# Patient Record
Sex: FEMALE | Race: WHITE | NOT HISPANIC OR LATINO | Employment: FULL TIME | URBAN - METROPOLITAN AREA
[De-identification: names, ages, dates, MRNs, and addresses within clinical notes are randomized per-mention and may not be internally consistent; named-entity substitution may affect disease eponyms.]

---

## 2020-06-02 ENCOUNTER — INITIAL PRENATAL (OUTPATIENT)
Dept: OBGYN | Facility: CLINIC | Age: 20
End: 2020-06-02
Payer: COMMERCIAL

## 2020-06-02 ENCOUNTER — HOSPITAL ENCOUNTER (OUTPATIENT)
Facility: MEDICAL CENTER | Age: 20
End: 2020-06-02
Attending: OBSTETRICS & GYNECOLOGY
Payer: COMMERCIAL

## 2020-06-02 VITALS
DIASTOLIC BLOOD PRESSURE: 73 MMHG | SYSTOLIC BLOOD PRESSURE: 103 MMHG | HEIGHT: 64 IN | WEIGHT: 163.3 LBS | BODY MASS INDEX: 27.88 KG/M2

## 2020-06-02 DIAGNOSIS — Z78.9 HISTORY OF INCARCERATION: ICD-10-CM

## 2020-06-02 DIAGNOSIS — F32.A DEPRESSION, UNSPECIFIED DEPRESSION TYPE: ICD-10-CM

## 2020-06-02 DIAGNOSIS — Z34.02 ENCOUNTER FOR PRENATAL CARE OF FIRST PREGNANCY, SECOND TRIMESTER: ICD-10-CM

## 2020-06-02 LAB
APPEARANCE UR: CLEAR
BILIRUB UR STRIP-MCNC: NORMAL MG/DL
C TRACH DNA SPEC QL NAA+PROBE: NEGATIVE
COLOR UR AUTO: YELLOW
GLUCOSE UR STRIP.AUTO-MCNC: NEGATIVE MG/DL
KETONES UR STRIP.AUTO-MCNC: NEGATIVE MG/DL
LEUKOCYTE ESTERASE UR QL STRIP.AUTO: NORMAL
N GONORRHOEA DNA SPEC QL NAA+PROBE: NEGATIVE
NITRITE UR QL STRIP.AUTO: NEGATIVE
PH UR STRIP.AUTO: 6 [PH] (ref 5–8)
PROT UR QL STRIP: NEGATIVE MG/DL
RBC UR QL AUTO: NEGATIVE
SP GR UR STRIP.AUTO: NEGATIVE
SPECIMEN SOURCE: NORMAL
UROBILINOGEN UR STRIP-MCNC: NORMAL MG/DL

## 2020-06-02 PROCEDURE — 81002 URINALYSIS NONAUTO W/O SCOPE: CPT | Performed by: OBSTETRICS & GYNECOLOGY

## 2020-06-02 PROCEDURE — 59401 PR NEW OB VISIT: CPT | Performed by: OBSTETRICS & GYNECOLOGY

## 2020-06-02 PROCEDURE — 76815 OB US LIMITED FETUS(S): CPT | Performed by: OBSTETRICS & GYNECOLOGY

## 2020-06-02 PROCEDURE — 87591 N.GONORRHOEAE DNA AMP PROB: CPT

## 2020-06-02 PROCEDURE — 87491 CHLMYD TRACH DNA AMP PROBE: CPT

## 2020-06-02 RX ORDER — PNV NO.95/FERROUS FUM/FOLIC AC 28MG-0.8MG
1 TABLET ORAL DAILY
Qty: 30 TAB | Refills: 6 | Status: SHIPPED | OUTPATIENT
Start: 2020-06-02 | End: 2021-03-01

## 2020-06-02 SDOH — HEALTH STABILITY: MENTAL HEALTH: HOW OFTEN DO YOU HAVE A DRINK CONTAINING ALCOHOL?: NEVER

## 2020-06-02 NOTE — PROGRESS NOTES
NOB visit  LMP:11/7/2020  Good Phone #:[phone]  Pharmacy verified.  C/o: Pt states had a hx of bv on 4/2020 and had tx'd. Pt states having no complaints for today.

## 2020-06-02 NOTE — LETTER
"Count Your Baby's Movements  Another step to a healthy delivery    Caitlyn gato            Dept: [phone]    How Many Weeks Pregnant? Unknown    Date to Begin Counting: Today, 6/2/2020              How to use this chart    One way for your physician to keep track of your baby's health is by knowing how often the baby moves (or \"kicks\") in your womb.  You can help your physician to do this by using this chart every day.    Every day, you should see how many hours it takes for your baby to move 10 times.  Start in the morning, as soon as you get up.    · First, write down the time your baby moves until you get to 10.  · Check off one box every time your baby moves until you get to 10.  · Write down the time you finished counting in the last column.  · Total how long it took to count up all 10 movements.  · Finally, fill in the box that shows how long this took.  After counting 10 movements, you no longer have to count any more that day.  The next morning, just start counting again as soon as you get up.    What should you call a \"movement\"?  It is hard to say, because it will feel different from one mother to another and from one pregnancy to the next.  The important thing is that you count the movements the same way throughout your pregnancy.  If you have more questions, you should ask your physician.    Count carefully every day!  SAMPLE:  Week 28    How many hours did it take to feel 10 movements?       Start  Time     1     2     3     4     5     6     7     8     9     10   Finish Time   Mon 8:20 ·  ·  ·  ·  ·  ·  ·  ·  ·  ·  11:40   Tue Wed Thu Fri               Sat               Sun                 IMPORTANT: You should contact your physician if it takes more than two hours for you to feel 10 movements.  Each morning, write down the time and start to count the movements of your baby.  Keep track by checking off one box every time you feel one movement.  When you " "have felt 10 \"kicks\", write down the time you finished counting in the last column.  Then fill in the   box (over the check domingo) for the number of hours it took.  Be sure to read the complete instructions on the previous page.            "

## 2020-06-02 NOTE — PROGRESS NOTES
"Cc: establish care.    HPI:  The patient is a 19 y.o.  here for new ob visit.  Her LMP was 2019 making her around 29 weeks with HILTON of 2020.  [facility].  She states she was [place] from the facility to a doctor's office.  She did have a 10-week ultrasound where they told her her due date would be .  She also states she received a level 2 ultrasound and quad screening.  Patient states she was given a 3-hour glucose test twice because the facility states they \"did it wrong\".  She was given the diagnosis of gestational diabetes.  She has not been testing her glucose.    Social: FOB is involved.  Name Rajan.  Patient currently living at home with her mother under house arrest.  She denies any history of mental, sexual, or physical abuse.  She denies any history of drug abuse.  Did smoke marijuana but not during pregnancy.    GYN history  LMP 2019.  Cycles are regular monthly lasting 4 to 6 days.  States she did get a Pap smear in alf which was negative.  Remote history of HPV however repeat Pap smear was negative.  Contraceptive history: Combination oral contraceptives however could not remember to take them  Denies any history of sexually transmitted infections    OB History    Para Term  AB Living   4 1 1   2 1   SAB TAB Ectopic Molar Multiple Live Births     2       1      # Outcome Date GA Lbr Ranjeet/2nd Weight Sex Delivery Anes PTL Lv   4 Current            3 TAB 2019           2 TAB 2018           1 Term 17 40w0d   M Induction EPI  GWENDOLYN     Past Medical History:   Diagnosis Date   • Depression 2020     Past Surgical History:   Procedure Laterality Date   • OTHER      Nose surgery     Social History     Socioeconomic History   • Marital status: Single     Spouse name: Not on file   • Number of children: Not on file   • Years of education: Not on file   • Highest education level: Not on file " "  Occupational History   • Not on file   Social Needs   • Financial resource strain: Not on file   • Food insecurity     Worry: Not on file     Inability: Not on file   • Transportation needs     Medical: Not on file     Non-medical: Not on file   Tobacco Use   • Smoking status: Never Smoker   • Smokeless tobacco: Never Used   Substance and Sexual Activity   • Alcohol use: Never     Frequency: Never   • Drug use: Never   • Sexual activity: Yes     Partners: Male     Comment: Planned pregnancy, not  but baby is welcomed. FOB  is iinvolved and  supportive.   Lifestyle   • Physical activity     Days per week: Not on file     Minutes per session: Not on file   • Stress: Not on file   Relationships   • Social connections     Talks on phone: Not on file     Gets together: Not on file     Attends [employer] service: Not on file     Active member of club or organization: Not on file     Attends meetings of clubs or organizations: Not on file     Relationship status: Not on file   • Intimate partner violence     Fear of current or ex partner: Not on file     Emotionally abused: Not on file     Physically abused: Not on file     Forced sexual activity: Not on file   Other Topics Concern   • Not on file   Social History Narrative   • Not on file     History reviewed. No pertinent family history.  Allergies:   Allergies as of 06/02/2020 - Reviewed 06/02/2020   Allergen Reaction Noted   • Pcn [penicillins]  06/02/2020       PE:    /73   Ht 1.626 m (5' 4\")   Wt 74.1 kg (163 lb 4.8 oz)     General: no apparent distress, is well developed and well nourished  Head: normocephalic, atraumatic  Neck: neck is supple, non-tender, no thyromegaly, full range of motion  CVS: regular rate and rhythm, no peripheral edema  Lungs: Normal respiratory effort. Clear to auscultation bilaterally  Abdomen: Bowel sounds positive, nondistended, soft, nontender x4, no rebound or guarding.  Female GYN: normal female external genitalia " without lesions, curd-like vaginal discharge noted, normal cervix, normal adnexa without tenderness  Skin: No rashes, or ulcers or lesions seen  Psychiatric: Patient shows appropriate affect, is alert and oriented x3, intact judgment and insight.    Bedside ultrasound performed  Indication: Dating and viability  Findings:  Single IUP in vertex position.  Positive fetal movement.  LAM 13 cm.  BPD 7.76 cm.  31 weeks 1 day  HC 9.19 cm.  29 weeks 1 day  AC 24.44 cm.  28 weeks 5 days  FL 5.57 cm.  29 weeks 2 days.  Impression:Gestational age by ultrasound 29 weeks 4 days.  Estimated due date 2020      A/P:  19 y.o.  29w5d based upon  Patient's last menstrual period was 2019 (exact date)..  She is here for her new obstetric visit.    DATIN. Encounter for prenatal care of first pregnancy, second trimester  POCT Urinalysis    URINE DRUG SCREEN W/CONF (AR)    Chlamydia/GC PCR Urine Or Swab    PRENATAL PANEL 3+HIV+HCV    HEMOGLOBIN A1C    GLUCOSE 1HR GESTATIONAL    CANCELED: US-OB 2ND 3RD TRI COMPLETE   2. History of incarceration     3. Depression, unspecified depression type       -Dating: As patient claims she had a 10-week ultrasound and was told her due date was , discussed with her that this is likely the most accurate due date however discussed with her that she is measuring consistent with her LMP today which would make her due .  For now, we will keep her due date of 813 until we can review the records from the [facility]  - Ultrasound for dates and anatomy was performed at [facility].  We will request the records ASAP  - Screening: Quad screening was also performed in [facility]  -Glucose screening: Given that the patient is unsure whether or not she has gestational diabetes as she took a 3-hour test twice, we will initiate 1 hour glucose screening and A1c today  -Depression: She was started on Zoloft 50 mg in [facility].  I advised the patient today that antidepressants such as Zoloft are  associated with potential for  withdrawal and jitteriness.  I also advised that would prefer to keep the patient on this medication with her previous social history and will alert neonatology at time of delivery.  Patient understands and is willing to comply  -Candidiasis: Diagnosed on visual inspection of the vaginal wall today.  Terconazole per vagina for 7 days prescribed.  Discussed with patient how to use this medication.    - NOB packet given with ACOG prenatal book.  - Increase water intake and encouraged healthy nutrition. Encouraged moderate exercise may continue into final trimester.   - Continue prenatal vitamins.  - Follow-up in 2 weeks.   - SAB precautions educated.  - Oriented to practice model and number of expected visits in the future.

## 2020-06-08 ENCOUNTER — HOSPITAL ENCOUNTER (OUTPATIENT)
Dept: LAB | Facility: MEDICAL CENTER | Age: 20
End: 2020-06-08
Attending: OBSTETRICS & GYNECOLOGY
Payer: COMMERCIAL

## 2020-06-08 DIAGNOSIS — Z34.02 ENCOUNTER FOR PRENATAL CARE OF FIRST PREGNANCY, SECOND TRIMESTER: ICD-10-CM

## 2020-06-08 LAB
ABO GROUP BLD: NORMAL
BLD GP AB SCN SERPL QL: NORMAL
EST. AVERAGE GLUCOSE BLD GHB EST-MCNC: 91 MG/DL
HBA1C MFR BLD: 4.8 % (ref 0–5.6)
RH BLD: NORMAL

## 2020-06-08 PROCEDURE — 86900 BLOOD TYPING SEROLOGIC ABO: CPT

## 2020-06-08 PROCEDURE — 86762 RUBELLA ANTIBODY: CPT

## 2020-06-08 PROCEDURE — 36415 COLL VENOUS BLD VENIPUNCTURE: CPT

## 2020-06-08 PROCEDURE — 86780 TREPONEMA PALLIDUM: CPT

## 2020-06-08 PROCEDURE — 87389 HIV-1 AG W/HIV-1&-2 AB AG IA: CPT

## 2020-06-08 PROCEDURE — 85025 COMPLETE CBC W/AUTO DIFF WBC: CPT

## 2020-06-08 PROCEDURE — 82950 GLUCOSE TEST: CPT

## 2020-06-08 PROCEDURE — 86803 HEPATITIS C AB TEST: CPT

## 2020-06-08 PROCEDURE — 87340 HEPATITIS B SURFACE AG IA: CPT

## 2020-06-08 PROCEDURE — 86850 RBC ANTIBODY SCREEN: CPT

## 2020-06-08 PROCEDURE — 86901 BLOOD TYPING SEROLOGIC RH(D): CPT

## 2020-06-08 PROCEDURE — 83036 HEMOGLOBIN GLYCOSYLATED A1C: CPT

## 2020-06-09 LAB
BASOPHILS # BLD AUTO: 0.4 % (ref 0–1.8)
BASOPHILS # BLD: 0.04 K/UL (ref 0–0.12)
EOSINOPHIL # BLD AUTO: 0.09 K/UL (ref 0–0.51)
EOSINOPHIL NFR BLD: 0.9 % (ref 0–6.9)
ERYTHROCYTE [DISTWIDTH] IN BLOOD BY AUTOMATED COUNT: 46.4 FL (ref 35.9–50)
GLUCOSE 1H P 50 G GLC PO SERPL-MCNC: 117 MG/DL (ref 70–139)
HBV SURFACE AG SER QL: ABNORMAL
HCT VFR BLD AUTO: 41.7 % (ref 37–47)
HCV AB SER QL: ABNORMAL
HGB BLD-MCNC: 13.6 G/DL (ref 12–16)
HIV 1+2 AB+HIV1 P24 AG SERPL QL IA: NORMAL
IMM GRANULOCYTES # BLD AUTO: 0.04 K/UL (ref 0–0.11)
IMM GRANULOCYTES NFR BLD AUTO: 0.4 % (ref 0–0.9)
LYMPHOCYTES # BLD AUTO: 1.73 K/UL (ref 1–4.8)
LYMPHOCYTES NFR BLD: 17 % (ref 22–41)
MCH RBC QN AUTO: 31.6 PG (ref 27–33)
MCHC RBC AUTO-ENTMCNC: 32.6 G/DL (ref 33.6–35)
MCV RBC AUTO: 97 FL (ref 81.4–97.8)
MONOCYTES # BLD AUTO: 0.46 K/UL (ref 0–0.85)
MONOCYTES NFR BLD AUTO: 4.5 % (ref 0–13.4)
NEUTROPHILS # BLD AUTO: 7.82 K/UL (ref 2–7.15)
NEUTROPHILS NFR BLD: 76.8 % (ref 44–72)
NRBC # BLD AUTO: 0 K/UL
NRBC BLD-RTO: 0 /100 WBC
PLATELET # BLD AUTO: 194 K/UL (ref 164–446)
PMV BLD AUTO: 11.2 FL (ref 9–12.9)
RBC # BLD AUTO: 4.3 M/UL (ref 4.2–5.4)
RUBV AB SER QL: 175 IU/ML
TREPONEMA PALLIDUM IGG+IGM AB [PRESENCE] IN SERUM OR PLASMA BY IMMUNOASSAY: ABNORMAL
WBC # BLD AUTO: 10.2 K/UL (ref 4.8–10.8)

## 2020-06-11 ENCOUNTER — TELEPHONE (OUTPATIENT)
Dept: OBGYN | Facility: CLINIC | Age: 20
End: 2020-06-11

## 2020-06-11 ENCOUNTER — DATING (OUTPATIENT)
Dept: OBGYN | Facility: CLINIC | Age: 20
End: 2020-06-11

## 2020-06-11 ENCOUNTER — DOCUMENTATION (OUTPATIENT)
Dept: OBGYN | Facility: CLINIC | Age: 20
End: 2020-06-11

## 2020-06-11 NOTE — PROGRESS NOTES
I reviewed patient's previous prenatal records from [place]  Re: Dating: She was seen at the first trimester and was 7+2 weeks by ultrasound.  According to the note, she was unsure of LMP and so they used HILTON of 8/23/2020  A second note 1 month later stated that her crown-rump length was 7 days more than expected and they were considering re-dating her pending anatomy scan.  Her anatomy scan was performed on 3/23/2020 and showed an HILTON consistent with 8/22/2020 thus they did not change her HILTON.  Normal anatomy noted.    DATING: We will keep her HILTON based off of the 7-week ultrasound of 8/23/2020 as this is the earliest ultrasound and her anatomy scan was also consistent with this.    Regarding glucose tolerance: 2-hour GTT was reportedly borderline high at the fasting level however suspected not performed correctly and the patient was not truly fasting.  I ordered a 1 hour which she passed and her A1c was 4.8% thus I doubt she has gestational diabetes.    Other prenatal labs:  Integrated screen negative,   HIV negative  Urine drug screen negative  Pap smear negative except bacterial vaginosis  Oh positive antibody screen negative, rubella immune, hepatitis B negative, RPR negative, hemoglobin 13, platelets 221.  TSH 2.48  Baseline creatinine 0.53

## 2020-06-11 NOTE — TELEPHONE ENCOUNTER
Pt notified, no further questions.     ----- Message from Joanie Roy D.O. sent at 6/9/2020  8:57 AM PDT -----  Please call patient and let her know she passed the glucose testing and her labs look normal.  ------------------------------------------------    This patient is a transfer of care from [place]. Can you double check on the request of records that we sent for last week? Specifically her confirmation of pregnancy visit notes. I need it to compare dating.

## 2020-06-11 NOTE — TELEPHONE ENCOUNTER
06/11/20 9:45 AM  LM to call back in regards to results below.    ----- Message from Joanie Roy D.O. sent at 6/9/2020  8:57 AM PDT -----  Please call patient and let her know she passed the glucose testing and her labs look normal.  ------------------------------------------------    This patient is a transfer of care from [place]. Can you double check on the request of records that we sent for last week? Specifically her confirmation of pregnancy visit notes. I need it to compare dating.

## 2020-06-17 ENCOUNTER — ROUTINE PRENATAL (OUTPATIENT)
Dept: OBGYN | Facility: CLINIC | Age: 20
End: 2020-06-17
Payer: OTHER GOVERNMENT

## 2020-06-17 VITALS — SYSTOLIC BLOOD PRESSURE: 113 MMHG | BODY MASS INDEX: 27.82 KG/M2 | DIASTOLIC BLOOD PRESSURE: 62 MMHG | WEIGHT: 162.1 LBS

## 2020-06-17 DIAGNOSIS — Z34.80 SUPERVISION OF OTHER NORMAL PREGNANCY, ANTEPARTUM: ICD-10-CM

## 2020-06-17 PROCEDURE — 90040 PR PRENATAL FOLLOW UP: CPT | Performed by: OBSTETRICS & GYNECOLOGY

## 2020-06-17 NOTE — LETTER
"Count Your Baby's Movements  Another step to a healthy delivery    Caitlyn Dara           Dept: [phone]    How Many Weeks Pregnant:30W3D    Date to Begin Counting: Today 6/17/2020              How to use this chart    One way for your physician to keep track of your baby's health is by knowing how often the baby moves (or \"kicks\") in your womb.  You can help your physician to do this by using this chart every day.    Every day, you should see how many hours it takes for your baby to move 10 times.  Start in the morning, as soon as you get up.    · First, write down the time your baby moves until you get to 10.  · Check off one box every time your baby moves until you get to 10.  · Write down the time you finished counting in the last column.  · Total how long it took to count up all 10 movements.  · Finally, fill in the box that shows how long this took.  After counting 10 movements, you no longer have to count any more that day.  The next morning, just start counting again as soon as you get up.    What should you call a \"movement\"?  It is hard to say, because it will feel different from one mother to another and from one pregnancy to the next.  The important thing is that you count the movements the same way throughout your pregnancy.  If you have more questions, you should ask your physician.    Count carefully every day!  SAMPLE:  Week 28    How many hours did it take to feel 10 movements?       Start  Time     1     2     3     4     5     6     7     8     9     10   Finish Time   Mon 8:20 ·  ·  ·  ·  ·  ·  ·  ·  ·  ·  11:40   Tue Wed Thu Fri               Sat               Sun                 IMPORTANT: You should contact your physician if it takes more than two hours for you to feel 10 movements.  Each morning, write down the time and start to count the movements of your baby.  Keep track by checking off one box every time you feel one movement.  When you have " "felt 10 \"kicks\", write down the time you finished counting in the last column.  Then fill in the   box (over the check domingo) for the number of hours it took.  Be sure to read the complete instructions on the previous page.            "

## 2020-06-17 NOTE — PROGRESS NOTES
Pt's here for OB follow-up  Reports + fetal movement  No VB, LOF or UC's.  Good Phone #:[phone]  Pharmacy verified.  Pt states has had bump between her left breast and armpit x 4 mos.  Pt states that she had her Tdap vaccine on 5/18/2020.

## 2020-06-17 NOTE — PROGRESS NOTES
S: Pt presents for routine OB follow up.  No contractions, vaginal bleeding, or leaking fluids. Good fetal movement.    O: /62   Wt 73.5 kg (162 lb 1.6 oz)   LMP 2019 (Exact Date)   BMI 27.82 kg/m²   Vitals:    20 0757   BP: 113/62   Weight: 73.5 kg (162 lb 1.6 oz)     Vitals, fundal height , fetal position, and FHR reviewed on flowsheet    Patient Active Problem List   Diagnosis   • History of incarceration   • Depression       Lab:  Recent Labs     20  1258 20  1300   ABOGROUP  --  O   RUBELLAIGG 175.00  --    HEPBSAG Non-Reactive  --    HEPCAB Non-Reactive  --        A/P:  19 y.o.  at 30w3d presents for routine obstetric follow-up.     #Prenatal care  - Continue prenatal vitamins.  HILTON by 7wk US c/w level II US. Previous care w/ [place] [place] [facility]. Record requested.  PNP, anatomy, flu/tdap, quad screening all done and normal from [place] records. GCCT negative  1 hour GTT negative here. A1C 4.8  Del planning: desires epidural. Interested in IOL at 40 weeks.    #Depression: on zoloft 50mg daily      - Follow-up: 2 weeks

## 2020-07-02 ENCOUNTER — ROUTINE PRENATAL (OUTPATIENT)
Dept: OBGYN | Facility: CLINIC | Age: 20
End: 2020-07-02
Payer: OTHER GOVERNMENT

## 2020-07-02 VITALS — SYSTOLIC BLOOD PRESSURE: 106 MMHG | BODY MASS INDEX: 28.84 KG/M2 | WEIGHT: 168 LBS | DIASTOLIC BLOOD PRESSURE: 62 MMHG

## 2020-07-02 DIAGNOSIS — Z34.83 ENCOUNTER FOR SUPERVISION OF OTHER NORMAL PREGNANCY IN THIRD TRIMESTER: ICD-10-CM

## 2020-07-02 PROCEDURE — 90040 PR PRENATAL FOLLOW UP: CPT | Performed by: OBSTETRICS & GYNECOLOGY

## 2020-07-02 NOTE — NON-PROVIDER
OB follow up   + fetal movement.  No VB, LOF or UC's.  Wt:168       BP:106/62  Phone # [phone]  Preferred pharmacy confirmed.  Patient states that she needs 2 dental authorizations.

## 2020-07-02 NOTE — LETTER
2020      Caitlyn Diamond is currently pregnant and being cared for by [facility].     She is medically cleared for:    1. Dental exams and routine cleaning  2. Tooth fillings and extractions as needed  3. Antibiotic therapy as appropriate  4. Local anesthesia    Patient may be administered the followin% Lidocaine with 1:100,000 Epinephrine  4% Septocaine with 1:100,000 Epinephrine  Nitrous Oxide  A narcotic or non-narcotic pain medication  An antibiotic such as penicillin or clindamycin    NO TETRACYCLINE and NO CODEINE        Thank you,          Aziza Pond M.D.    Electronically Signed

## 2020-07-02 NOTE — PROGRESS NOTES
19 y.o.  32w4d The patient is here for routine obstetrical followup. She reports good fetal movement. She denies contractions, vaginal bleeding, or leaking of fluid.    The patient's pregnancy is complicated by   Patient Active Problem List    Diagnosis Date Noted   • Supervision of other normal pregnancy, antepartum 2020   • History of incarceration 2020   • Depression 2020   Note for dental clearance given.       Followup in 2 weeks   labor precautions were discussed with patient  Fetal kick counts were discussed with patient

## 2020-07-02 NOTE — LETTER
2020      Caitlyn Diamond is currently pregnant and being cared for by [facility].     She is medically cleared for:    1. Dental exams and routine cleaning  2. Tooth fillings and extractions as needed  3. Antibiotic therapy as appropriate  4. Local anesthesia    Patient may be administered the followin% Lidocaine with 1:100,000 Epinephrine  4% Septocaine with 1:100,000 Epinephrine  Nitrous Oxide  A narcotic or non-narcotic pain medication  An antibiotic such as penicillin or clindamycin    NO TETRACYCLINE and NO CODEINE        Thank you,          Aziza Pond M.D.    Electronically Signed

## 2020-07-15 ENCOUNTER — ROUTINE PRENATAL (OUTPATIENT)
Dept: OBGYN | Facility: CLINIC | Age: 20
End: 2020-07-15
Payer: OTHER GOVERNMENT

## 2020-07-15 VITALS
HEART RATE: 77 BPM | SYSTOLIC BLOOD PRESSURE: 105 MMHG | WEIGHT: 170 LBS | BODY MASS INDEX: 29.18 KG/M2 | DIASTOLIC BLOOD PRESSURE: 60 MMHG

## 2020-07-15 DIAGNOSIS — Z34.83 ENCOUNTER FOR SUPERVISION OF OTHER NORMAL PREGNANCY IN THIRD TRIMESTER: ICD-10-CM

## 2020-07-15 LAB
APPEARANCE UR: CLEAR
BILIRUB UR STRIP-MCNC: NORMAL MG/DL
COLOR UR AUTO: YELLOW
GLUCOSE UR STRIP.AUTO-MCNC: NEGATIVE MG/DL
KETONES UR STRIP.AUTO-MCNC: NEGATIVE MG/DL
LEUKOCYTE ESTERASE UR QL STRIP.AUTO: NORMAL
NITRITE UR QL STRIP.AUTO: NEGATIVE
PH UR STRIP.AUTO: 7 [PH] (ref 5–8)
PROT UR QL STRIP: NEGATIVE MG/DL
RBC UR QL AUTO: NEGATIVE
SP GR UR STRIP.AUTO: 1.01
UROBILINOGEN UR STRIP-MCNC: NORMAL MG/DL

## 2020-07-15 PROCEDURE — 90040 PR PRENATAL FOLLOW UP: CPT | Performed by: OBSTETRICS & GYNECOLOGY

## 2020-07-15 PROCEDURE — 81002 URINALYSIS NONAUTO W/O SCOPE: CPT | Performed by: OBSTETRICS & GYNECOLOGY

## 2020-07-15 NOTE — PROGRESS NOTES
Pt here today for OB follow up  Pt states kidney pain  Reports +FM  Good # [phone]  Pharmacy Confirmed.  Chaperone offered and provided.

## 2020-07-15 NOTE — PROGRESS NOTES
S: Pt presents for routine OB follow up.  No contractions, vaginal bleeding, or leaking fluids. Good fetal movement.  Admits to some left-sided back pain which reminds her of an episode of nephrolithiasis she had during her previous pregnancy albeit that episode was at 20 weeks.  Does not have any dysuria    O: /60   Pulse 77   Wt 77.1 kg (170 lb)   LMP 2019 (Exact Date)   BMI 29.18 kg/m²   Vitals:    07/15/20 1607   BP: 105/60   Weight: 77.1 kg (170 lb)     Vitals, fundal height , fetal position, and FHR reviewed on flowsheet    Patient Active Problem List   Diagnosis   • History of incarceration   • Depression   • Supervision of other normal pregnancy, antepartum       Lab:  Recent Labs     20  1258 20  1300   ABOGROUP  --  O   RUBELLAIGG 175.00  --    HEPBSAG Non-Reactive  --    HEPCAB Non-Reactive  --        A/P:  19 y.o.  at 34w3d presents for routine obstetric follow-up.     #Prenatal care  - Continue prenatal vitamins.  HILTON by 7wk US c/w level II US. Previous care w/ [place] [place] [facility]. Record requested.  PNP, anatomy, flu/tdap, quad screening all done and normal from [place] records. GCCT negative  1 hour GTT negative here. A1C 4.8  Del planning: desires epidural. Interested in IOL at 40 weeks.    #Depression: on zoloft 50mg daily  -In office urine dip was negative for blood, nitrites, and only positive for small leukocyte esterase.  Discussed with patient no indication for kidney stone at this point.  Advised her to increase hydration and may use a heat pack to the back.  - Follow-up: 2wks for GBS

## 2020-07-30 ENCOUNTER — ROUTINE PRENATAL (OUTPATIENT)
Dept: OBGYN | Facility: CLINIC | Age: 20
End: 2020-07-30
Payer: OTHER GOVERNMENT

## 2020-07-30 ENCOUNTER — HOSPITAL ENCOUNTER (OUTPATIENT)
Facility: MEDICAL CENTER | Age: 20
End: 2020-07-30
Attending: OBSTETRICS & GYNECOLOGY
Payer: OTHER GOVERNMENT

## 2020-07-30 VITALS — DIASTOLIC BLOOD PRESSURE: 62 MMHG | WEIGHT: 176 LBS | BODY MASS INDEX: 30.21 KG/M2 | SYSTOLIC BLOOD PRESSURE: 106 MMHG

## 2020-07-30 DIAGNOSIS — Z34.80 SUPERVISION OF OTHER NORMAL PREGNANCY, ANTEPARTUM: ICD-10-CM

## 2020-07-30 DIAGNOSIS — Z3A.36 PREGNANCY WITH 36 COMPLETED WEEKS GESTATION: ICD-10-CM

## 2020-07-30 DIAGNOSIS — Z78.9 HISTORY OF INCARCERATION: ICD-10-CM

## 2020-07-30 DIAGNOSIS — F32.0 CURRENT MILD EPISODE OF MAJOR DEPRESSIVE DISORDER WITHOUT PRIOR EPISODE (HCC): ICD-10-CM

## 2020-07-30 PROCEDURE — 90040 PR PRENATAL FOLLOW UP: CPT | Performed by: OBSTETRICS & GYNECOLOGY

## 2020-07-30 PROCEDURE — 87150 DNA/RNA AMPLIFIED PROBE: CPT

## 2020-07-30 PROCEDURE — 87081 CULTURE SCREEN ONLY: CPT

## 2020-07-30 NOTE — PROGRESS NOTES
Pt here today for OB follow up  Pt states   Reports +FM  Pharmacy Confirmed.  Chaperone offered and declined.

## 2020-07-30 NOTE — PROGRESS NOTES
S: Pt presents for routine OB follow up.  Patient is doing well without complaints.  Reports normal  fetal movement.  Denies headaches or scotoma.  Reports normal bowel and bladder functions  No contractions, vaginal bleeding, or leakage of fluid.  Patient is desiring induction of labor at 39 weeks-induction of labor discussed.  Referral placed  Questions answered.    O: LMP 2019 (Exact Date)   Patients' weight gain, fluid intake and exercise level discussed.  Vitals, fundal height , fetal position, and FHR reviewed on flowsheet    Lab:No results found for this or any previous visit (from the past 336 hour(s)).    A/P:  19 y.o.  at 36w4d presents for routine obstetric follow-up.  Doing well  Size equals dates   Encounter Diagnoses   Name Primary?   • Supervision of other normal pregnancy, antepartum    • Pregnancy with 36 completed weeks gestation    • History of incarceration-patient wears ankle monitor    • Current mild episode of major depressive disorder without prior episode (HCC).  Denies any current symptoms of depression          1.  Continue prenatal vitamins.  2.  Fetal kick counts daily discussed.  3.  Exercise at least 30 minutes daily.  4.  Drink at least 2L of water daily  5.  Pregnancy and labor precautions educated.  6.  Follow-up in 1 week.  7.  GBS culture obtained today

## 2020-07-31 LAB — GP B STREP DNA SPEC QL NAA+PROBE: NEGATIVE

## 2020-08-04 ENCOUNTER — ROUTINE PRENATAL (OUTPATIENT)
Dept: OBGYN | Facility: CLINIC | Age: 20
End: 2020-08-04
Payer: OTHER GOVERNMENT

## 2020-08-04 ENCOUNTER — TELEPHONE (OUTPATIENT)
Dept: OBGYN | Facility: CLINIC | Age: 20
End: 2020-08-04

## 2020-08-04 VITALS — SYSTOLIC BLOOD PRESSURE: 111 MMHG | DIASTOLIC BLOOD PRESSURE: 66 MMHG | WEIGHT: 177 LBS | BODY MASS INDEX: 30.38 KG/M2

## 2020-08-04 DIAGNOSIS — Z34.80 SUPERVISION OF OTHER NORMAL PREGNANCY, ANTEPARTUM: ICD-10-CM

## 2020-08-04 PROCEDURE — 90040 PR PRENATAL FOLLOW UP: CPT | Performed by: OBSTETRICS & GYNECOLOGY

## 2020-08-04 NOTE — PROGRESS NOTES
OB Followup;    37w2d    Patient Active Problem List    Diagnosis Date Noted   • Supervision of other normal pregnancy, antepartum 2020   • History of incarceration 2020   • Depression 2020       Vitals:    20 1016   BP: 111/66   Weight: 80.3 kg (177 lb)       Patient presents for followup of OB care. Currently doing well . Good fetal movement no leakage of fluid no contractions or vaginal bleeding        Size equals dates, normal fetal heart rate      Labs; GBS culture negative    Labor precautions given  Increase oral hydration  Discussed proper weight gain during pregnancy  Continue prenatal vitamins  Signs and symptoms of labor/ labor discussed     Followup in  1 weeks

## 2020-08-04 NOTE — TELEPHONE ENCOUNTER
Pt LM on VM requesting to speak with a provider regarding her birth plan. Pt had appt today and when I asked her if she had discussed this with the provider during her appt today, pt stated she did not. Adv pt she can discuss this further with the provider at her next visit    8/5/20 1557 Spoke with pt, states she needs to speak with a provider to go over birth plan. Again, notified pt she can discuss this at her appt, pt unable to come to next appt. Offered appt for tomorrow at 1300, pt agreed and scheduled

## 2020-08-04 NOTE — NON-PROVIDER
OB follow up   + fetal movement.  No VB, LOF or UC's.  Wt: 177      BP:111/66  Phone # [phone]  C/o feeling some contractions  Preferred pharmacy confirmed.  IP GEL on 08/17/2020 @ 9PM  GBS negative  Patient wants to discuss birth plan.

## 2020-08-06 ENCOUNTER — ROUTINE PRENATAL (OUTPATIENT)
Dept: OBGYN | Facility: CLINIC | Age: 20
End: 2020-08-06
Payer: OTHER GOVERNMENT

## 2020-08-06 VITALS — BODY MASS INDEX: 30.54 KG/M2 | DIASTOLIC BLOOD PRESSURE: 68 MMHG | SYSTOLIC BLOOD PRESSURE: 120 MMHG | WEIGHT: 177.9 LBS

## 2020-08-06 DIAGNOSIS — Z34.80 SUPERVISION OF OTHER NORMAL PREGNANCY, ANTEPARTUM: ICD-10-CM

## 2020-08-06 PROCEDURE — 90040 PR PRENATAL FOLLOW UP: CPT | Performed by: OBSTETRICS & GYNECOLOGY

## 2020-08-06 NOTE — PROGRESS NOTES
OB Followup;    37w4d    Patient Active Problem List    Diagnosis Date Noted   • Supervision of other normal pregnancy, antepartum 2020   • History of incarceration 2020   • Depression 2020       Vitals:    20 1305   BP: 120/68   Weight: 80.7 kg (177 lb 14.4 oz)       Patient presents for followup of OB care. Currently doing well . Good fetal movement no leakage of fluid no contractions or vaginal bleeding        Size equals dates, normal fetal heart rate      Patient has some questions about episiotomy we discussed her concerns in detail.  We discussed that routine episiotomy is no longer performed.  Patient asked about all of oil to massage her perineum.  I discussed that we do not use olive  oil but we will use surgical lube to massage the perineum.    Labor precautions given  Increase oral hydration  Discussed proper weight gain during pregnancy  Continue prenatal vitamins  Signs and symptoms of labor/ labor discussed   Discussed our group's policy on prenatal checkups and delivery    Followup in  1 weeks

## 2020-08-06 NOTE — PROGRESS NOTES
Pt's here for OB follow-up  Reports + fetal movement  No VB, LOF or UC's.  Good Phone #: [phone]  Pharmacy verified.  Pt states feeling nauseous x 2 d and david purdy for q d.  Pt states would like to have cervical check today.  Pt states no complaints for today.

## 2020-08-10 ENCOUNTER — ROUTINE PRENATAL (OUTPATIENT)
Dept: OBGYN | Facility: CLINIC | Age: 20
End: 2020-08-10
Payer: OTHER GOVERNMENT

## 2020-08-10 VITALS — SYSTOLIC BLOOD PRESSURE: 120 MMHG | BODY MASS INDEX: 30.73 KG/M2 | WEIGHT: 179 LBS | DIASTOLIC BLOOD PRESSURE: 66 MMHG

## 2020-08-10 DIAGNOSIS — Z34.83 ENCOUNTER FOR SUPERVISION OF OTHER NORMAL PREGNANCY IN THIRD TRIMESTER: ICD-10-CM

## 2020-08-10 PROCEDURE — 90040 PR PRENATAL FOLLOW UP: CPT | Performed by: OBSTETRICS & GYNECOLOGY

## 2020-08-10 NOTE — PROGRESS NOTES
Pt here today for OB follow up  Pt  Wants to be checked  Reports +FM  Pharmacy Confirmed.  Chaperone offered and declined.

## 2020-08-10 NOTE — PROGRESS NOTES
19 y.o.  38w1d The patient is here for routine obstetrical followup. She reports good fetal movement. She denies contractions, vaginal bleeding, or leaking of fluid.    The patient's pregnancy is complicated by   Patient Active Problem List    Diagnosis Date Noted   • Supervision of other normal pregnancy, antepartum 2020   • History of incarceration 2020   • Depression 2020         Followup for IOL as scheduled on 20.  Labor precautions were discussed with patient  Fetal kick counts were discussed with patient

## 2020-08-11 ENCOUNTER — HOSPITAL ENCOUNTER (INPATIENT)
Facility: MEDICAL CENTER | Age: 20
LOS: 3 days | End: 2020-08-14
Attending: OBSTETRICS & GYNECOLOGY | Admitting: OBSTETRICS & GYNECOLOGY
Payer: COMMERCIAL

## 2020-08-11 ENCOUNTER — ANESTHESIA (OUTPATIENT)
Dept: ANESTHESIOLOGY | Facility: MEDICAL CENTER | Age: 20
End: 2020-08-11
Payer: COMMERCIAL

## 2020-08-11 ENCOUNTER — ANESTHESIA EVENT (OUTPATIENT)
Dept: ANESTHESIOLOGY | Facility: MEDICAL CENTER | Age: 20
End: 2020-08-11
Payer: COMMERCIAL

## 2020-08-11 LAB
BASOPHILS # BLD AUTO: 0.2 % (ref 0–1.8)
BASOPHILS # BLD: 0.02 K/UL (ref 0–0.12)
COVID ORDER STATUS COVID19: NORMAL
EOSINOPHIL # BLD AUTO: 0.09 K/UL (ref 0–0.51)
EOSINOPHIL NFR BLD: 0.8 % (ref 0–6.9)
ERYTHROCYTE [DISTWIDTH] IN BLOOD BY AUTOMATED COUNT: 43.6 FL (ref 35.9–50)
HCT VFR BLD AUTO: 45 % (ref 37–47)
HGB BLD-MCNC: 14.7 G/DL (ref 12–16)
HOLDING TUBE BB 8507: NORMAL
IMM GRANULOCYTES # BLD AUTO: 0.04 K/UL (ref 0–0.11)
IMM GRANULOCYTES NFR BLD AUTO: 0.3 % (ref 0–0.9)
LYMPHOCYTES # BLD AUTO: 2.39 K/UL (ref 1–4.8)
LYMPHOCYTES NFR BLD: 20.1 % (ref 22–41)
MCH RBC QN AUTO: 30.1 PG (ref 27–33)
MCHC RBC AUTO-ENTMCNC: 32.7 G/DL (ref 33.6–35)
MCV RBC AUTO: 92.2 FL (ref 81.4–97.8)
MONOCYTES # BLD AUTO: 0.75 K/UL (ref 0–0.85)
MONOCYTES NFR BLD AUTO: 6.3 % (ref 0–13.4)
NEUTROPHILS # BLD AUTO: 8.63 K/UL (ref 2–7.15)
NEUTROPHILS NFR BLD: 72.3 % (ref 44–72)
NRBC # BLD AUTO: 0 K/UL
NRBC BLD-RTO: 0 /100 WBC
PLATELET # BLD AUTO: 230 K/UL (ref 164–446)
PMV BLD AUTO: 10.3 FL (ref 9–12.9)
RBC # BLD AUTO: 4.88 M/UL (ref 4.2–5.4)
SARS-COV-2 RDRP RESP QL NAA+PROBE: NOTDETECTED
SPECIMEN SOURCE: NORMAL
WBC # BLD AUTO: 11.9 K/UL (ref 4.8–10.8)

## 2020-08-11 PROCEDURE — 700111 HCHG RX REV CODE 636 W/ 250 OVERRIDE (IP)

## 2020-08-11 PROCEDURE — 700105 HCHG RX REV CODE 258

## 2020-08-11 PROCEDURE — C9803 HOPD COVID-19 SPEC COLLECT: HCPCS | Performed by: OBSTETRICS & GYNECOLOGY

## 2020-08-11 PROCEDURE — 770002 HCHG ROOM/CARE - OB PRIVATE (112)

## 2020-08-11 PROCEDURE — 700111 HCHG RX REV CODE 636 W/ 250 OVERRIDE (IP): Performed by: ANESTHESIOLOGY

## 2020-08-11 PROCEDURE — 85025 COMPLETE CBC W/AUTO DIFF WBC: CPT

## 2020-08-11 PROCEDURE — U0004 COV-19 TEST NON-CDC HGH THRU: HCPCS

## 2020-08-11 RX ORDER — SODIUM CHLORIDE, SODIUM LACTATE, POTASSIUM CHLORIDE, AND CALCIUM CHLORIDE .6; .31; .03; .02 G/100ML; G/100ML; G/100ML; G/100ML
1000 INJECTION, SOLUTION INTRAVENOUS
Status: DISCONTINUED | OUTPATIENT
Start: 2020-08-11 | End: 2020-08-12 | Stop reason: HOSPADM

## 2020-08-11 RX ORDER — ROPIVACAINE HYDROCHLORIDE 2 MG/ML
INJECTION, SOLUTION EPIDURAL; INFILTRATION PRN
Status: DISCONTINUED | OUTPATIENT
Start: 2020-08-11 | End: 2020-08-12 | Stop reason: SURG

## 2020-08-11 RX ORDER — MISOPROSTOL 200 UG/1
800 TABLET ORAL
Status: DISCONTINUED | OUTPATIENT
Start: 2020-08-11 | End: 2020-08-12 | Stop reason: HOSPADM

## 2020-08-11 RX ORDER — ROPIVACAINE HYDROCHLORIDE 2 MG/ML
INJECTION, SOLUTION EPIDURAL; INFILTRATION; PERINEURAL CONTINUOUS
Status: DISCONTINUED | OUTPATIENT
Start: 2020-08-12 | End: 2020-08-14 | Stop reason: HOSPADM

## 2020-08-11 RX ORDER — METHYLERGONOVINE MALEATE 0.2 MG/ML
0.2 INJECTION INTRAVENOUS
Status: DISCONTINUED | OUTPATIENT
Start: 2020-08-11 | End: 2020-08-12 | Stop reason: HOSPADM

## 2020-08-11 RX ORDER — ALUMINA, MAGNESIA, AND SIMETHICONE 2400; 2400; 240 MG/30ML; MG/30ML; MG/30ML
30 SUSPENSION ORAL EVERY 6 HOURS PRN
Status: DISCONTINUED | OUTPATIENT
Start: 2020-08-11 | End: 2020-08-12 | Stop reason: HOSPADM

## 2020-08-11 RX ORDER — SODIUM CHLORIDE, SODIUM LACTATE, POTASSIUM CHLORIDE, CALCIUM CHLORIDE 600; 310; 30; 20 MG/100ML; MG/100ML; MG/100ML; MG/100ML
INJECTION, SOLUTION INTRAVENOUS CONTINUOUS
Status: ACTIVE | OUTPATIENT
Start: 2020-08-11 | End: 2020-08-12

## 2020-08-11 RX ORDER — ROPIVACAINE HYDROCHLORIDE 2 MG/ML
INJECTION, SOLUTION EPIDURAL; INFILTRATION; PERINEURAL
Status: COMPLETED
Start: 2020-08-11 | End: 2020-08-11

## 2020-08-11 RX ORDER — DEXTROSE, SODIUM CHLORIDE, SODIUM LACTATE, POTASSIUM CHLORIDE, AND CALCIUM CHLORIDE 5; .6; .31; .03; .02 G/100ML; G/100ML; G/100ML; G/100ML; G/100ML
INJECTION, SOLUTION INTRAVENOUS CONTINUOUS
Status: DISCONTINUED | OUTPATIENT
Start: 2020-08-12 | End: 2020-08-14 | Stop reason: HOSPADM

## 2020-08-11 RX ORDER — SODIUM CHLORIDE, SODIUM LACTATE, POTASSIUM CHLORIDE, AND CALCIUM CHLORIDE .6; .31; .03; .02 G/100ML; G/100ML; G/100ML; G/100ML
250 INJECTION, SOLUTION INTRAVENOUS PRN
Status: DISCONTINUED | OUTPATIENT
Start: 2020-08-11 | End: 2020-08-12 | Stop reason: HOSPADM

## 2020-08-11 RX ORDER — SODIUM CHLORIDE, SODIUM LACTATE, POTASSIUM CHLORIDE, CALCIUM CHLORIDE 600; 310; 30; 20 MG/100ML; MG/100ML; MG/100ML; MG/100ML
INJECTION, SOLUTION INTRAVENOUS
Status: COMPLETED
Start: 2020-08-11 | End: 2020-08-11

## 2020-08-11 RX ADMIN — ROPIVACAINE HYDROCHLORIDE 10 ML: 2 INJECTION, SOLUTION EPIDURAL; INFILTRATION at 22:56

## 2020-08-11 RX ADMIN — ROPIVACAINE HYDROCHLORIDE 200 MG: 2 INJECTION, SOLUTION EPIDURAL; INFILTRATION at 23:16

## 2020-08-11 RX ADMIN — SODIUM CHLORIDE, POTASSIUM CHLORIDE, SODIUM LACTATE AND CALCIUM CHLORIDE: 600; 310; 30; 20 INJECTION, SOLUTION INTRAVENOUS at 22:15

## 2020-08-11 RX ADMIN — FENTANYL CITRATE 50 MCG: 50 INJECTION INTRAMUSCULAR; INTRAVENOUS at 21:54

## 2020-08-11 RX ADMIN — SODIUM CHLORIDE, SODIUM LACTATE, POTASSIUM CHLORIDE, CALCIUM CHLORIDE: 600; 310; 30; 20 INJECTION, SOLUTION INTRAVENOUS at 22:15

## 2020-08-11 RX ADMIN — ROPIVACAINE HYDROCHLORIDE 200 MG: 2 INJECTION, SOLUTION EPIDURAL; INFILTRATION; PERINEURAL at 23:16

## 2020-08-11 SDOH — ECONOMIC STABILITY: FOOD INSECURITY: WITHIN THE PAST 12 MONTHS, YOU WORRIED THAT YOUR FOOD WOULD RUN OUT BEFORE YOU GOT MONEY TO BUY MORE.: PATIENT DECLINED

## 2020-08-11 SDOH — ECONOMIC STABILITY: TRANSPORTATION INSECURITY
IN THE PAST 12 MONTHS, HAS LACK OF TRANSPORTATION KEPT YOU FROM MEETINGS, WORK, OR FROM GETTING THINGS NEEDED FOR DAILY LIVING?: PATIENT DECLINED

## 2020-08-11 SDOH — ECONOMIC STABILITY: FOOD INSECURITY: WITHIN THE PAST 12 MONTHS, THE FOOD YOU BOUGHT JUST DIDN'T LAST AND YOU DIDN'T HAVE MONEY TO GET MORE.: PATIENT DECLINED

## 2020-08-11 SDOH — ECONOMIC STABILITY: TRANSPORTATION INSECURITY
IN THE PAST 12 MONTHS, HAS THE LACK OF TRANSPORTATION KEPT YOU FROM MEDICAL APPOINTMENTS OR FROM GETTING MEDICATIONS?: PATIENT DECLINED

## 2020-08-11 ASSESSMENT — PATIENT HEALTH QUESTIONNAIRE - PHQ9
2. FEELING DOWN, DEPRESSED, IRRITABLE, OR HOPELESS: NOT AT ALL
SUM OF ALL RESPONSES TO PHQ9 QUESTIONS 1 AND 2: 0
1. LITTLE INTEREST OR PLEASURE IN DOING THINGS: NOT AT ALL

## 2020-08-11 ASSESSMENT — LIFESTYLE VARIABLES
EVER_SMOKED: NEVER
ALCOHOL_USE: NO

## 2020-08-12 LAB
ERYTHROCYTE [DISTWIDTH] IN BLOOD BY AUTOMATED COUNT: 41.6 FL (ref 35.9–50)
HCT VFR BLD AUTO: 41.3 % (ref 37–47)
HGB BLD-MCNC: 13.5 G/DL (ref 12–16)
MCH RBC QN AUTO: 29.7 PG (ref 27–33)
MCHC RBC AUTO-ENTMCNC: 32.7 G/DL (ref 33.6–35)
MCV RBC AUTO: 90.8 FL (ref 81.4–97.8)
PLATELET # BLD AUTO: 187 K/UL (ref 164–446)
PMV BLD AUTO: 10.5 FL (ref 9–12.9)
RBC # BLD AUTO: 4.55 M/UL (ref 4.2–5.4)
WBC # BLD AUTO: 15.8 K/UL (ref 4.8–10.8)

## 2020-08-12 PROCEDURE — 700111 HCHG RX REV CODE 636 W/ 250 OVERRIDE (IP)

## 2020-08-12 PROCEDURE — 770002 HCHG ROOM/CARE - OB PRIVATE (112)

## 2020-08-12 PROCEDURE — 59400 OBSTETRICAL CARE: CPT | Performed by: OBSTETRICS & GYNECOLOGY

## 2020-08-12 PROCEDURE — 36415 COLL VENOUS BLD VENIPUNCTURE: CPT

## 2020-08-12 PROCEDURE — 85027 COMPLETE CBC AUTOMATED: CPT

## 2020-08-12 PROCEDURE — 59409 OBSTETRICAL CARE: CPT

## 2020-08-12 PROCEDURE — 700102 HCHG RX REV CODE 250 W/ 637 OVERRIDE(OP): Performed by: OBSTETRICS & GYNECOLOGY

## 2020-08-12 PROCEDURE — A9270 NON-COVERED ITEM OR SERVICE: HCPCS | Performed by: NURSE PRACTITIONER

## 2020-08-12 PROCEDURE — 700102 HCHG RX REV CODE 250 W/ 637 OVERRIDE(OP): Performed by: NURSE PRACTITIONER

## 2020-08-12 PROCEDURE — 304965 HCHG RECOVERY SERVICES

## 2020-08-12 PROCEDURE — A9270 NON-COVERED ITEM OR SERVICE: HCPCS | Performed by: OBSTETRICS & GYNECOLOGY

## 2020-08-12 PROCEDURE — 303615 HCHG EPIDURAL/SPINAL ANESTHESIA FOR LABOR

## 2020-08-12 PROCEDURE — 700111 HCHG RX REV CODE 636 W/ 250 OVERRIDE (IP): Performed by: OBSTETRICS & GYNECOLOGY

## 2020-08-12 RX ORDER — DOCUSATE SODIUM 100 MG/1
100 CAPSULE, LIQUID FILLED ORAL 2 TIMES DAILY PRN
Status: DISCONTINUED | OUTPATIENT
Start: 2020-08-12 | End: 2020-08-14 | Stop reason: HOSPADM

## 2020-08-12 RX ORDER — ONDANSETRON 2 MG/ML
4 INJECTION INTRAMUSCULAR; INTRAVENOUS EVERY 6 HOURS PRN
Status: DISCONTINUED | OUTPATIENT
Start: 2020-08-12 | End: 2020-08-14 | Stop reason: HOSPADM

## 2020-08-12 RX ORDER — ACETAMINOPHEN 500 MG
1000 TABLET ORAL EVERY 8 HOURS PRN
Status: DISCONTINUED | OUTPATIENT
Start: 2020-08-12 | End: 2020-08-14 | Stop reason: HOSPADM

## 2020-08-12 RX ORDER — ONDANSETRON 4 MG/1
4 TABLET, ORALLY DISINTEGRATING ORAL EVERY 6 HOURS PRN
Status: DISCONTINUED | OUTPATIENT
Start: 2020-08-12 | End: 2020-08-14 | Stop reason: HOSPADM

## 2020-08-12 RX ORDER — METOCLOPRAMIDE HYDROCHLORIDE 5 MG/ML
10 INJECTION INTRAMUSCULAR; INTRAVENOUS EVERY 6 HOURS PRN
Status: DISCONTINUED | OUTPATIENT
Start: 2020-08-12 | End: 2020-08-14 | Stop reason: HOSPADM

## 2020-08-12 RX ORDER — SODIUM CHLORIDE, SODIUM LACTATE, POTASSIUM CHLORIDE, CALCIUM CHLORIDE 600; 310; 30; 20 MG/100ML; MG/100ML; MG/100ML; MG/100ML
INJECTION, SOLUTION INTRAVENOUS PRN
Status: DISCONTINUED | OUTPATIENT
Start: 2020-08-12 | End: 2020-08-14 | Stop reason: HOSPADM

## 2020-08-12 RX ORDER — IBUPROFEN 600 MG/1
600 TABLET ORAL EVERY 6 HOURS PRN
Status: DISCONTINUED | OUTPATIENT
Start: 2020-08-12 | End: 2020-08-14 | Stop reason: HOSPADM

## 2020-08-12 RX ADMIN — ACETAMINOPHEN 1000 MG: 500 TABLET ORAL at 17:44

## 2020-08-12 RX ADMIN — SERTRALINE 50 MG: 50 TABLET, FILM COATED ORAL at 10:29

## 2020-08-12 RX ADMIN — ACETAMINOPHEN 1000 MG: 500 TABLET ORAL at 09:32

## 2020-08-12 RX ADMIN — Medication 125 ML/HR: at 03:00

## 2020-08-12 RX ADMIN — IBUPROFEN 600 MG: 600 TABLET ORAL at 19:13

## 2020-08-12 RX ADMIN — IBUPROFEN 600 MG: 600 TABLET ORAL at 05:52

## 2020-08-12 RX ADMIN — Medication: at 02:00

## 2020-08-12 ASSESSMENT — EDINBURGH POSTNATAL DEPRESSION SCALE (EPDS)
THE THOUGHT OF HARMING MYSELF HAS OCCURRED TO ME: NEVER
I HAVE BEEN SO UNHAPPY THAT I HAVE BEEN CRYING: NO, NEVER
I HAVE FELT SAD OR MISERABLE: NO, NOT AT ALL
I HAVE BEEN ABLE TO LAUGH AND SEE THE FUNNY SIDE OF THINGS: AS MUCH AS I ALWAYS COULD
I HAVE BEEN ANXIOUS OR WORRIED FOR NO GOOD REASON: NO, NOT AT ALL
THINGS HAVE BEEN GETTING ON TOP OF ME: NO, I HAVE BEEN COPING AS WELL AS EVER
I HAVE BLAMED MYSELF UNNECESSARILY WHEN THINGS WENT WRONG: NO, NEVER
I HAVE BEEN SO UNHAPPY THAT I HAVE HAD DIFFICULTY SLEEPING: NOT AT ALL
I HAVE FELT SCARED OR PANICKY FOR NO GOOD REASON: NO, NOT AT ALL
I HAVE LOOKED FORWARD WITH ENJOYMENT TO THINGS: AS MUCH AS I EVER DID

## 2020-08-12 ASSESSMENT — PAIN SCALES - GENERAL: PAIN_LEVEL: 0

## 2020-08-12 NOTE — DISCHARGE PLANNING
Discharge Planning Assessment Post Partum    Reason for Referral: CECILLE hx of depression.   Address: [address] [place] [address] [address] [place], [place] [address]  Type of Living Situation: MOB's parents and other child  Mom Diagnosis: Pregnancy   Baby Diagnosis:   Primary Language: English    Name of Baby: Annie   Father of the Baby: Edward [place]  Involved in baby’s care? Yes  Contact Information: [phone]    Prenatal Care: Yes  Mom's PCP: None  PCP for new baby: MOB provided with pediatrician list.     Support System: Yes  Coping/Bonding between mother & baby: Yes  Source of Feeding: Breast  Supplies for Infant: Yes    Mom's Insurance: [employer] and   Baby Covered on Insurance: MOB would like to sign baby up for Medicaid, LSW emailed PFA w/ baby's information.   Mother Employed/School: Not employed.  Other children in the home/names & ages: N/A    Financial Hardship/Income: No  Mom's Mental status: Alert and oriented x4.  Services used prior to admit: None.    CPS History: No.   Psychiatric History: CECILLE has a hx of depression and is currently seeing a therapist regularly and is also on Zoloft.   Domestic Violence History: No.  Drug/ETOH History: Yes, MOB stated she used an edible a month or so prior to baby's birth. MOB stated it was just one time. Baby's UDS was negative.     CECILLE is currently on GPS arrest since she recently was in [place]. MOB stated that she can still go to baby's pediatrician appointment etc. CECILLE's parents are a really good support system for her.      Resources Provided: MOB provided with a list of pediatricians.   Referrals Made: None.      Clearance for Discharge: Baby is clear to d/c with MOB upon medical clearance.     Ongoing Plan: No further social work needs.

## 2020-08-12 NOTE — ANESTHESIA PROCEDURE NOTES
Epidural Block    Date/Time: 8/11/2020 10:55 PM  Performed by: Maurilio Comer M.D.  Authorized by: Maurilio Comer M.D.     Patient Location:  OB  Start Time:  8/11/2020 10:55 PM  Reason for Block: labor analgesia    patient identified, IV checked, site marked, risks and benefits discussed, surgical consent, monitors and equipment checked, pre-op evaluation and timeout performed    Patient Position:  Sitting  Prep: ChloraPrep, patient draped and sterile technique    Monitoring:  Blood pressure, continuous pulse oximetry and heart rate  Approach:  Midline  Location:  L4-L5  Injection Technique:  ALLIE saline  Skin infiltration:  Lidocaine  Strength:  1%  Dose:  3ml  Needle Type:  Tuohy  Needle Gauge:  17 G  Needle Length:  3.5 in  Loss of resistance::  5.5  Catheter Size:  19 G  Catheter at Skin Depth:  11

## 2020-08-12 NOTE — ANESTHESIA QCDR
2019 [facility] Clinical Data Registry (for Quality Improvement)     Postoperative nausea/vomiting risk protocol (Adult = 18 yrs and Pediatric 3-17 yrs)- (430 and 463)  General inhalation anesthetic (NOT TIVA) with PONV risk factors: No  Provision of anti-emetic therapy with at least 2 different classes of agents: N/A  Patient DID NOT receive anti-emetic therapy and reason is documented in Medical Record: N/A    Multimodal Pain Management- (477)  Non-emergent surgery AND patient age >= 18: No  Use of Multimodal Pain Management, two or more drugs and/or interventions, NOT including systemic opioids:   Exception: Documented allergy to multiple classes of analgesics:     Smoking Abstinence (404)  Patient is current smoker (cigarette, pipe, e-cig, marijuanna): No  Elective Surgery:   Abstinence instructions provided prior to day of surgery:   Patient abstained from smoking on day of surgery:     Pre-Op Beta-Blocker in Isolated CABG (44)  Isolated CABG AND patient age >= 18: No  Beta-blocker admin within 24 hours of surgical incision:   Exception:of medical reason(s) for not administering beta blocker within 24 hours prior to surgical incision (e.g., not  indicated,other medical reason):     PACU assessment of acute postoperative pain prior to Anesthesia Care End- Applies to Patients Age = 18- (ABG7)  Initial PACU pain score is which of the following: < 7/10  Patient unable to report pain score: N/A    Post-anesthetic transfer of care checklist/protocol to PACU/ICU- (426 and 427)  Upon conclusion of case, patient transferred to which of the following locations: PACU/Non-ICU  Use of transfer checklist/protocol: Yes  Exclusion: Service Performed in Patient Hospital Room (and thus did not require transfer): N/A  Unplanned admission to ICU related to anesthesia service up through end of PACU care- (MD51)  Unplanned admission to ICU (not initially anticipated at anesthesia start time): No

## 2020-08-12 NOTE — PROGRESS NOTES
0320 Admitted from L and D per wheelchair, Pt oriented to room Encourage her to call the first time go to the bathroom, Assessment done denies pain at this time, Admission care rendered.

## 2020-08-12 NOTE — CONSULTS
"This is mom's second baby. She  her now 2 year old for 10 months. Mom states that this baby has just finished his second feeding, both lasting 30 minutes.    Mom states she has no discomfort during feedings. Mom denied any questions at this time. Assistance offered at the next feeding but mom states, \"I've got this, I will call if I need help.\"  "

## 2020-08-12 NOTE — PROGRESS NOTES
Labor Check    S: Pt examined after epidural. State SROM recently.    O:  Gen: AAO in NAD  Abd: gravid, nontender to palpation  SVE: perineum with clear fluid per vagina. Unable to palpate bag on exam. Ant lip / 100 / -1  Ext: nontender bl, nonedematous    FHT: 135 / mod uri / reactive  Markle: q 5min    A/P:  19 y.o.  at 38w3d in active labor, spont ruptured.  - epidural in place  - anticipate

## 2020-08-12 NOTE — ANESTHESIA POSTPROCEDURE EVALUATION
Patient: Caitlyn Diamond    Procedure Summary     Date: 08/11/20 Room / Location:     Anesthesia Start: 2248 Anesthesia Stop: 08/12/20 0153    Procedure: Labor Epidural Diagnosis:     Scheduled Providers:  Responsible Provider: Maurilio Comer M.D.    Anesthesia Type: epidural ASA Status: 2          Final Anesthesia Type: epidural  Last vitals  BP   Blood Pressure: 109/51    Temp   37.3 °C (99.1 °F)    Pulse   Pulse: 74   Resp   17    SpO2   95 %      Anesthesia Post Evaluation    Patient location during evaluation: PACU  Patient participation: complete - patient participated  Level of consciousness: awake and alert  Pain score: 0    Airway patency: patent  Anesthetic complications: no  Cardiovascular status: hemodynamically stable  Respiratory status: acceptable  Hydration status: euvolemic    PONV: none           Nurse Pain Score: 4 (NPRS)

## 2020-08-12 NOTE — ANESTHESIA TIME REPORT
Anesthesia Start and Stop Event Times     Date Time Event    8/11/2020 2240 Ready for Procedure     2248 Anesthesia Start    8/12/2020 0153 Anesthesia Stop        Responsible Staff  08/11/20 to 08/12/20    Name Role Begin End    Maurilio Comer M.D. Anesth 2248 0153        Preop Diagnosis (Free Text):  Pre-op Diagnosis             Preop Diagnosis (Codes):    Post op Diagnosis  Pregnancy      Premium Reason  A. 3PM - 7AM    Comments:

## 2020-08-12 NOTE — H&P
History and Physical    Caitlyn Diamond is a 19 y.o. female  at 38w2d who presents for contractions this evening. They are closer than 5min apart and she would like an epidural.    Subjective:   CTXs: positive   Pain: positive  LOF: negative  Vaginal bleeding: negative   Fetal movement: positive    ROS: Pertinent positives documented in HPI and all other systems reviewed & are negative    OB History    Para Term  AB Living   4 1 1   2 1   SAB TAB Ectopic Molar Multiple Live Births     2       1      # Outcome Date GA Lbr Ranjeet/2nd Weight Sex Delivery Anes PTL Lv   4 Current            3 TAB 2019           2 TAB 2018           1 Term 16 40w0d   M Induction EPI  GWENDOLYN       GYN history  LMP 2019.  Cycles are regular monthly lasting 4 to 6 days.  States she did get a Pap smear in long-term which was negative.  Remote history of HPV however repeat Pap smear was negative.  Contraceptive history: Combination oral contraceptives however could not remember to take them  Denies any history of sexually transmitted infections    Past Medical History:   Diagnosis Date   • Depression 2020       Past Surgical History:   Procedure Laterality Date   • OTHER      Nose surgery         Current Facility-Administered Medications:   •  LR infusion, , Intravenous, Continuous, Joanie Batesst, D.O.  •  [START ON 2020] D5LR infusion, , Intravenous, Continuous, Joanie Batesst, D.O.  •  fentaNYL (SUBLIMAZE) injection 50 mcg, 50 mcg, Intravenous, Q HOUR PRN, Joanie Batesst, D.O.  •  mag hydrox-al hydrox-simeth (MAALOX PLUS ES or MYLANTA DS) suspension 30 mL, 30 mL, Oral, Q6HRS PRN, Joanie Roy, D.O.  •  miSOPROStol (CYTOTEC) tablet 800 mcg, 800 mcg, Rectal, Once PRN, Joanie Batesst, D.O.  •  methylergonovine (METHERGINE) injection 0.2 mg, 0.2 mg, Intramuscular, Once PRN, Joanie Batesst, D.O.  •  LACTATED RINGERS IV SOLN, , , ,   •  oxytocin (PITOCIN) 20 UNITS/1000ML LR, , , ,   •  FENTANYL CITRATE (PF) 0.05 MG/ML  INJ SOLN (WRAPPED), , , ,     Allergies: Pcn [penicillins]    Social History     Socioeconomic History   • Marital status: Single     Spouse name: Not on file   • Number of children: Not on file   • Years of education: Not on file   • Highest education level: Not on file   Occupational History   • Not on file   Social Needs   • Financial resource strain: Not on file   • Food insecurity     Worry: Patient refused     Inability: Patient refused   • Transportation needs     Medical: Patient refused     Non-medical: Patient refused   Tobacco Use   • Smoking status: Never Smoker   • Smokeless tobacco: Never Used   Substance and Sexual Activity   • Alcohol use: Never     Frequency: Never   • Drug use: Not Currently     Types: Inhaled     Comment: Marijuana in past 2 years   • Sexual activity: Yes     Partners: Male     Comment: Planned pregnancy, not  but baby is welcomed. FOB  is iinvolved and  supportive.   Lifestyle   • Physical activity     Days per week: Not on file     Minutes per session: Not on file   • Stress: Not on file   Relationships   • Social connections     Talks on phone: Not on file     Gets together: Not on file     Attends Bahai service: Not on file     Active member of club or organization: Not on file     Attends meetings of clubs or organizations: Not on file     Relationship status: Not on file   • Intimate partner violence     Fear of current or ex partner: Not on file     Emotionally abused: Not on file     Physically abused: Not on file     Forced sexual activity: Not on file   Other Topics Concern   • Not on file   Social History Narrative   • Not on file       [facility] with [facility] with following problems:  Patient Active Problem List    Diagnosis Date Noted   • Supervision of other normal pregnancy, antepartum 06/17/2020   • History of incarceration 06/02/2020   • Depression 06/02/2020       Objective:      /65   Pulse 78   Temp 37 °C (98.6 °F) (Temporal)   Resp 17   Ht  "1.626 m (5' 4\")   Wt 81.2 kg (179 lb)     General:   no acute distress, alert   Skin:   normal   HEENT:  PERRLA   Lungs:   CTA bilateral   Heart:   chest is clear without rales or wheezing, no pedal edema   Abdomen:   soft, gravid, NT   EFW:  3000gr   Pelvis:  adequate with gynecoid pelvis   FHT:  125 BPM  Accels yes  Decels no  Variability moderate  Category i   Uterine Size: S=D   Presentations: Cephalic   Cervix: 6cm     Lab Review  Lab:   Blood type: O     Recent Results (from the past 5880 hour(s))   POCT Urinalysis    Collection Time: 06/02/20  8:44 AM   Result Value Ref Range    POC Color YELLOW Negative    POC Appearance CLEAR Negative    POC Leukocyte Esterase MODERATE Negative    POC Nitrites NEGATIVE Negative    POC Urobiligen N/A Negative (0.2) mg/dL    POC Protein NEGATIVE Negative mg/dL    POC Urine PH 6.0 5.0 - 8.0    POC Blood NEGATIVE Negative    POC Specific Gravity NEGATIVE <1.005 - >1.030    POC Ketones NEGATIVE Negative mg/dL    POC Bilirubin N/A Negative mg/dL    POC Glucose NEGATIVE Negative mg/dL   Chlamydia/GC PCR Urine Or Swab    Collection Time: 06/02/20  9:35 AM    Specimen: Genital   Result Value Ref Range    C. trachomatis by PCR Negative Negative    N. gonorrhoeae by PCR Negative Negative    Source Genital    GLUCOSE 1HR GESTATIONAL    Collection Time: 06/08/20 12:58 PM   Result Value Ref Range    Glucose, Post Dose 117 70 - 139 mg/dL   HEMOGLOBIN A1C    Collection Time: 06/08/20 12:58 PM   Result Value Ref Range    Glycohemoglobin 4.8 0.0 - 5.6 %    Est Avg Glucose 91 mg/dL   PRENATAL PANEL 3+HIV+HCV    Collection Time: 06/08/20 12:58 PM   Result Value Ref Range    WBC 10.2 4.8 - 10.8 K/uL    RBC 4.30 4.20 - 5.40 M/uL    Hemoglobin 13.6 12.0 - 16.0 g/dL    Hematocrit 41.7 37.0 - 47.0 %    MCV 97.0 81.4 - 97.8 fL    MCH 31.6 27.0 - 33.0 pg    MCHC 32.6 (L) 33.6 - 35.0 g/dL    RDW 46.4 35.9 - 50.0 fL    Platelet Count 194 164 - 446 K/uL    MPV 11.2 9.0 - 12.9 fL    Neutrophils-Polys " 76.80 (H) 44.00 - 72.00 %    Lymphocytes 17.00 (L) 22.00 - 41.00 %    Monocytes 4.50 0.00 - 13.40 %    Eosinophils 0.90 0.00 - 6.90 %    Basophils 0.40 0.00 - 1.80 %    Immature Granulocytes 0.40 0.00 - 0.90 %    Nucleated RBC 0.00 /100 WBC    Neutrophils (Absolute) 7.82 (H) 2.00 - 7.15 K/uL    Lymphs (Absolute) 1.73 1.00 - 4.80 K/uL    Monos (Absolute) 0.46 0.00 - 0.85 K/uL    Eos (Absolute) 0.09 0.00 - 0.51 K/uL    Baso (Absolute) 0.04 0.00 - 0.12 K/uL    Immature Granulocytes (abs) 0.04 0.00 - 0.11 K/uL    NRBC (Absolute) 0.00 K/uL    Rubella IgG Antibody 175.00 IU/mL    Hepatitis B Surface Antigen Non-Reactive Non-Reactive    Hepatitis C Antibody Non-Reactive Non-Reactive    Syphilis, Treponemal Qual Non-Reactive Non-Reactive   HIV AG/AB COMBO ASSAY SCREENING    Collection Time: 06/08/20 12:58 PM   Result Value Ref Range    HIV Ag/Ab Combo Assay Non-Reactive Non Reactive   OP Prenatal Panel-Blood Bank    Collection Time: 06/08/20  1:00 PM   Result Value Ref Range    ABO Grouping Only O     Rh Grouping Only POS     Antibody Screen Scrn NEG    POCT Urinalysis    Collection Time: 07/15/20  4:50 PM   Result Value Ref Range    POC Color yellow Negative    POC Appearance clear Negative    POC Leukocyte Esterase small Negative    POC Nitrites negative Negative    POC Urobiligen n/a Negative (0.2) mg/dL    POC Protein negative Negative mg/dL    POC Urine PH 7.0 5.0 - 8.0    POC Blood negative Negative    POC Specific Gravity 1.015 <1.005 - >1.030    POC Ketones negative Negative mg/dL    POC Bilirubin n/a Negative mg/dL    POC Glucose negative Negative mg/dL   GRP B STREP, BY PCR ([facility])    Collection Time: 07/30/20 11:46 AM    Specimen: Genital   Result Value Ref Range    Strep Gp B DNA PCR Negative Negative        Assessment:   Caitlyn Diamond at 38w2d  Labor status: Active phase labor.  Obstetrical history significant for   Patient Active Problem List    Diagnosis Date Noted   • Supervision of other normal  pregnancy, antepartum 06/17/2020   • History of incarceration 06/02/2020   • Depression 06/02/2020   .      Plan:     Admit to L&D  GBS negative  Fetal monitoring/toco  Epidural now  Fentanyl while on hold

## 2020-08-12 NOTE — PROGRESS NOTES
2200 - Assumed care of pt from HIMANSHU Ramos RN and CARLOS Alvares RN. Admit completed. Pt requesting epidural.   2246 - Dr. Comer at bedside for epidural placement.   2330 - SVE: 8/90/-2.   0015 - SROM clear fluid.   0017 - Dr. Roy at bedside. SVE: 9/90/-1.  0100 - Report given to Merle MCRAE.

## 2020-08-12 NOTE — L&D DELIVERY NOTE
Normal Spontaneous Vaginal Delivery    Date of procedure: 2020  PreOp Dx: 38w3d wk IUP in active labor  PostOp Dx: Same with delivery of a viable male infant at 0153 hours weighing 3620 with APGARS of 7 and 9 and 1 and 5min respectively.  Procedure: Spontaneous vaginal delivery  Surgeon: Joanie Roy DO  Assistants: none  Anesthesia: epidural  EBL: 300 cc  Indications: This is a 19 y.o.  at 38w3d weeks by LMP with an EDC of Estimated Date of Delivery: 20 who presented as a transfer of care in the third trimester. Her prenatal course was complicated by incarceration. Prenatal lab data [place] by 7wk US c/w level II US. Previous care w/ [place], [place] [facility]. Record requested.  PNP, anatomy, flu/tdap, quad screening all done and normal from [place] records. GCCT negative  1 hour GTT negative here. A1C 4.8  Del planning: desires epidural. Interested in IOL at 40 weeks.    #Depression: on zoloft 50mg daily     Labor course: presented at 5-6cm dilated sophia regularly. Had epidural for pain management. Delivered shortly after    Procedure: The patient was noted to be complete so was placed in dorsal lithotomy position, prepped and draped in the usual sterile fashion for delivery. The patient was asked to push and the head was delivered spontaneously in the cephalic OA to ABHAY position over and intact perineum. A nuchal cord was checked and none was found. The anterior shoulder delivered easily and the posterior shoulder followed. The remainder of the infant was easily delivered and the oropharynx and nasopharynx was bulb suctioned. The infant was noted to have spontaneous cry and spontaneous movement of all four extremities. The cord was clamped x 2 and cut - it was noted to have 3 vessels. The infant was passed to the mother and  nursing/NICU personnel was in attendance. The placenta delivered intact spontaneously and the uterus was evacuated of clots. Pitocin 20 units in 1L was started to firm the  uterus. Examination of cervix and vaginal vault revealed no laceration.      The patient tolerated this procedure well and recovered in L&D with her infant. All sponge, needle, and instrument counts were correct.         -------------------------------------------------------------------------------------------------------------  Joanie Roy D.O.

## 2020-08-13 PROCEDURE — 700102 HCHG RX REV CODE 250 W/ 637 OVERRIDE(OP): Performed by: OBSTETRICS & GYNECOLOGY

## 2020-08-13 PROCEDURE — 770002 HCHG ROOM/CARE - OB PRIVATE (112)

## 2020-08-13 PROCEDURE — A9270 NON-COVERED ITEM OR SERVICE: HCPCS | Performed by: NURSE PRACTITIONER

## 2020-08-13 PROCEDURE — A9270 NON-COVERED ITEM OR SERVICE: HCPCS | Performed by: OBSTETRICS & GYNECOLOGY

## 2020-08-13 PROCEDURE — RXMED WILLOW AMBULATORY MEDICATION CHARGE: Performed by: STUDENT IN AN ORGANIZED HEALTH CARE EDUCATION/TRAINING PROGRAM

## 2020-08-13 PROCEDURE — 700102 HCHG RX REV CODE 250 W/ 637 OVERRIDE(OP): Performed by: NURSE PRACTITIONER

## 2020-08-13 RX ORDER — PSEUDOEPHEDRINE HCL 30 MG
100 TABLET ORAL 2 TIMES DAILY PRN
Qty: 60 CAP | Refills: 0 | Status: SHIPPED | OUTPATIENT
Start: 2020-08-13 | End: 2021-03-01

## 2020-08-13 RX ORDER — IBUPROFEN 600 MG/1
600 TABLET ORAL EVERY 6 HOURS PRN
Qty: 60 TAB | Refills: 0 | Status: SHIPPED | OUTPATIENT
Start: 2020-08-13 | End: 2021-03-01

## 2020-08-13 RX ADMIN — SERTRALINE 50 MG: 50 TABLET, FILM COATED ORAL at 10:07

## 2020-08-13 RX ADMIN — IBUPROFEN 600 MG: 600 TABLET ORAL at 10:07

## 2020-08-13 RX ADMIN — ACETAMINOPHEN 1000 MG: 500 TABLET ORAL at 13:05

## 2020-08-13 RX ADMIN — IBUPROFEN 600 MG: 600 TABLET ORAL at 02:15

## 2020-08-13 RX ADMIN — IBUPROFEN 600 MG: 600 TABLET ORAL at 17:13

## 2020-08-13 NOTE — DISCHARGE SUMMARY
[facility]  NORMAL SPONTANEOUS VAGINAL DISCHARGE SUMMARY    PATIENT ID:  NAME:  Caitlyn Diamond  MRN:               [id number]  YOB: 2000    DATE OF ADMISSION: 8/11/2020    DATE OF DISCHARGE: 8/13/2020     ADMITTING DIAGNOSIS:  1. Intrauterine pregnancy at 38w3d.      DISCHARGE DIAGNOSIS:  1. s/p Vaginal Delivery        HOSPITAL COURSE: This is a 19 y.o. year old female admitted at 38w3d who presented with Positive contractions, negative LOF, negative vaginal bleeding, positive FM and in active labor. Pt was 6 cm dilated on sterile vaginal exam. Pregnancy was uncomplicated. The patient had a good labor pattern after admission and proceeded to deliver a viable male infant weighing  7 lbs and 15.7 oz. Infants Apgars scores were 7 and 9 at one and five minutes. The patients postpartum course was uncomplicated and she was discharged home in stable condition on postpartum day #1.    PROCEDURES PERFORMED: Normal spontaneous vaginal delivery     COMPLICATIONS: none    DIET: regular  Bowel: Yes  Voiding: yes  Ambulating: Yes  Pain Control: With Ibuprofen  Breastfeeding: Yes  Contraception: OCP's  Denies: HA, RUQ pain, SOB, or Dizziness.     ACTIVITY: No intercourse and nothing inserted into the vagina for 5 weeks.    MEDICATIONS:  No current outpatient medications on file.         Objective:    Vitals:    08/12/20 0600 08/12/20 1004 08/12/20 1800 08/13/20 0600   BP: 112/64 (!) 99/65 126/86 105/76   Pulse: 66 78 72 74   Resp: 18 18 19 16   Temp: 36.5 °C (97.7 °F) 36.5 °C (97.7 °F) 36.8 °C (98.3 °F) 36.3 °C (97.4 °F)   TempSrc: Temporal Temporal Temporal Temporal   SpO2: 97% 92% 97% 97%   Weight:       Height:         General: No acute distress, resting comfortably in bed.  HEENT: normocephalic, nontraumatic, PERRLA, EOMI  Cardiovascular: Heart RRR with no murmurs, rubs or gallops. Distal Pulses 2+  Respiratory: symmetric chest expansion, lungs CTA bilaterally with no wheezes rales or rhonci  Abdomen: soft, mildly  tender, fundus firm, +BS      Recent Labs     08/11/20  2130 08/12/20  0956   WBC 11.9* 15.8*   RBC 4.88 4.55   HEMOGLOBIN 14.7 13.5   HEMATOCRIT 45.0 41.3   MCV 92.2 90.8   MCH 30.1 29.7   RDW 43.6 41.6   PLATELETCT 230 187   MPV 10.3 10.5   NEUTSPOLYS 72.30*  --    LYMPHOCYTES 20.10*  --    MONOCYTES 6.30  --    EOSINOPHILS 0.80  --    BASOPHILS 0.20  --          - Follow up with [facility] in 5 weeks  - No intercourse and nothing inserted into the vagina for 5 weeks  - Continue taking prenatal   - Take iron supplements daily  - Tylenol and Motrin as needed for pain  - Colace as needed for constipation   - Return precautions: Increase vaginal bleeding, clots, chest pain/SOB, Increase edema especially in one limb, Severe abdominal pain, Fever > 100.4 FRANCESCO Velez MD  PGY-1  Family Medicine Resident

## 2020-08-13 NOTE — PROGRESS NOTES
0700-Bedside report received from Maria Eugenia Valdez RN. Assumed care of patient. Patient denies any needs at this time.  0830-Assessment completed.  Patient progressing according to plan of care.  Patient encouraged to call for any needs.  Discussed pain management. Patient is taking Motrin for complaint of pain when breast feeding. Bonding with infant, FOB at bedside.  1637-Pediatrician is not discharging infant today. Patient is breast feeding and wants to stay. Dr. Velez notified and discharge for today cancelled.

## 2020-08-13 NOTE — LACTATION NOTE
Baby nursed every 2-3 hours during the hs. Mom states he is latching well and having frequent urine and stool output.     Mom denies questions or concerns at this time. Information given on [facility] and Zoom support group.    Mom encouraged to ask for assistance prn.

## 2020-08-14 ENCOUNTER — PHARMACY VISIT (OUTPATIENT)
Dept: PHARMACY | Facility: MEDICAL CENTER | Age: 20
End: 2020-08-14
Payer: COMMERCIAL

## 2020-08-14 VITALS
DIASTOLIC BLOOD PRESSURE: 75 MMHG | SYSTOLIC BLOOD PRESSURE: 108 MMHG | RESPIRATION RATE: 16 BRPM | HEART RATE: 62 BPM | OXYGEN SATURATION: 94 % | WEIGHT: 179 LBS | HEIGHT: 64 IN | TEMPERATURE: 97.1 F | BODY MASS INDEX: 30.56 KG/M2

## 2020-08-14 PROCEDURE — A9270 NON-COVERED ITEM OR SERVICE: HCPCS | Performed by: NURSE PRACTITIONER

## 2020-08-14 PROCEDURE — 700102 HCHG RX REV CODE 250 W/ 637 OVERRIDE(OP): Performed by: OBSTETRICS & GYNECOLOGY

## 2020-08-14 PROCEDURE — A9270 NON-COVERED ITEM OR SERVICE: HCPCS | Performed by: OBSTETRICS & GYNECOLOGY

## 2020-08-14 PROCEDURE — 700102 HCHG RX REV CODE 250 W/ 637 OVERRIDE(OP): Performed by: NURSE PRACTITIONER

## 2020-08-14 RX ADMIN — IBUPROFEN 600 MG: 600 TABLET ORAL at 08:59

## 2020-08-14 RX ADMIN — SERTRALINE 50 MG: 50 TABLET, FILM COATED ORAL at 08:57

## 2020-08-14 ASSESSMENT — PATIENT HEALTH QUESTIONNAIRE - PHQ9
1. LITTLE INTEREST OR PLEASURE IN DOING THINGS: NOT AT ALL
2. FEELING DOWN, DEPRESSED, IRRITABLE, OR HOPELESS: NOT AT ALL
SUM OF ALL RESPONSES TO PHQ9 QUESTIONS 1 AND 2: 0

## 2020-08-14 NOTE — PROGRESS NOTES
Assessment complete. Fundus firm, lochia light. Pain 3/10, declines intervention. Discussed POC for the night. All questions answered at this time. Call light within reach. Encouraged patient to call with any further questions or concerns.

## 2020-08-14 NOTE — PROGRESS NOTES
0700-- Received report from THOR Del Cid, Infant at bedside in open crib no signs of distress.  Pt resting in bed. Discussed pain management for the day.  No further needs at the time.  Call light within reach, bed locked and in lowest position.  Rounding in place.    0900-- Assessment completed, VSS, Pt given PRN pain medication at this time.  Discussed plan of care for the day that pt is comfortable with.  All questions answered at this time.  Will continue to monitor.     1335 - Discharge teaching reviewed with pt, all questions answered.  Pt has prescriptions at bedside.  Pt has all written information on self and infant care, including prescriptions,  screening slip and information, and follow-up instructions. Bands verified, cuddles removed.  Pt will call when she is ready for car seat check.     1430- Infant checked in car seat by RN.  Pt discharged with infant in car seat by hospital escort.

## 2020-08-14 NOTE — DISCHARGE PLANNING
Medication reconcilliation completed. Medications delivered to patient at bedside. Patient counseled.     Caitlyn Diamond   Home Medication Instructions JESS:[id number]    Printed on:08/14/20 4814   Medication Information                      docusate sodium 100 MG Cap  Take 100 mg by mouth 2 times a day as needed for Constipation.             ibuprofen (MOTRIN) 600 MG Tab  Take 1 Tab by mouth every 6 hours as needed (For cramping after delivery; do not give if patient is receiving ketorolac (Toradol)).

## 2020-08-14 NOTE — CARE PLAN
Problem: Alteration in comfort related to episiotomy, vaginal repair and/or after birth pains  Goal: Patient is able to ambulate, care for self and infant  Intervention: Assess 0-10 pain level with vital signs  Note: Pain controlled     Problem: Alteration in comfort related to episiotomy, vaginal repair and/or after birth pains  Goal: Patient is able to ambulate, care for self and infant  Intervention: Assess 0-10 pain level with vital signs  Note: Pain controlled     
  Problem: Alteration in comfort related to episiotomy, vaginal repair and/or after birth pains  Goal: Patient verbalizes acceptable pain level  Outcome: PROGRESSING AS EXPECTED  Note: Patient states pain is tolerable with the use of PRN medications     Problem: Potential knowledge deficit related to lack of understanding of self and  care  Goal: Patient will verbalize understanding of self and infant care  Outcome: PROGRESSING AS EXPECTED  Note: Patient asks appropriate questions about self and infant care, verbalizes understanding     
  Problem: Altered physiologic condition related to immediate post-delivery state and potential for bleeding/hemorrhage  Goal: Patient physiologically stable as evidenced by normal lochia, palpable uterine involution and vital signs within normal limits  Intervention: Massage fundus as necessary to prevent excessive lochia  Note: Fundal massage done with light bleeding     
  Problem: Altered physiologic condition related to immediate post-delivery state and potential for bleeding/hemorrhage  Goal: Patient physiologically stable as evidenced by normal lochia, palpable uterine involution and vital signs within normal limits  Outcome: PROGRESSING AS EXPECTED     Problem: Alteration in comfort related to episiotomy, vaginal repair and/or after birth pains  Goal: Patient is able to ambulate, care for self and infant  Outcome: PROGRESSING AS EXPECTED     
  Problem: Altered physiologic condition related to immediate post-delivery state and potential for bleeding/hemorrhage  Goal: Patient physiologically stable as evidenced by normal lochia, palpable uterine involution and vital signs within normal limits  Outcome: PROGRESSING AS EXPECTED     Problem: Potential for postpartum infection related to presence of episiotomy/vaginal tear and/or uterine contamination  Goal: Patient will be absent from signs and symptoms of infection  Outcome: PROGRESSING AS EXPECTED     
  Problem: Pain  Goal: Alleviation of Pain or a reduction in pain to the patient's comfort goal  Outcome: PROGRESSING AS EXPECTED  Note: Pt pain plan is to have an epidural. IV pain medication unable pt able to get epidural.      Problem: Risk for Infection, Impaired Wound Healing  Goal: Remain free from signs and symptoms of infection  Outcome: PROGRESSING AS EXPECTED  Note: Pt monitored for s/s of infection. None at this time.     
  Problem: Potential knowledge deficit related to lack of understanding of self and  care  Goal: Patient will demonstrate ability to care for self and infant  Outcome: PROGRESSING AS EXPECTED  Note: Patient able to care for self and infant appropriately     Problem: Potential anxiety related to difficulty adapting to parental role  Goal: Patient will verbalize and demonstrate effective bonding and parenting behavior  Outcome: PROGRESSING AS EXPECTED  Note: Patient has decreased anxiety and is bonding well with infant     
  Problem: Safety  Goal: Will remain free from injury  Outcome: PROGRESSING AS EXPECTED  Note: Pt re-educated about use of call light if any assistance is needed. Pt. Is able to ambulate without any assistance at this time.      Problem: Infection  Goal: Will remain free from infection  Outcome: PROGRESSING AS EXPECTED  Note: Pt remains afebrile at this time. Pt. Shows no other s/s of infection.      
PMD

## 2020-08-14 NOTE — DISCHARGE INSTRUCTIONS
POSTPARTUM DISCHARGE INSTRUCTIONS FOR MOM    YOB: 2000   Age: 19 y.o.               Admit Date: 8/11/2020     Discharge Date: 8/14/2020  Attending Doctor:  Joanie Roy D.O.                  Allergies:  Pcn [penicillins]    Discharged to home by car. Discharged via walking, hospital escort: Yes.  Special equipment needed: Not Applicable  Belongings with: Personal  Be sure to schedule a follow-up appointment with your primary care doctor or any specialists as instructed.     Discharge Plan:   Diet Plan: Discussed  Activity Level: Discussed  Confirmed Follow up Appointment: Patient to Call and Schedule Appointment  Confirmed Symptoms Management: Discussed  Medication Reconciliation Updated: Yes    REASONS TO CALL YOUR OBSTETRICIAN:  1.   Persistent fever or shaking chills (Temperature higher than 100.4)  2.   Heavy bleeding (soaking more than 1 pad per hour); Passing clots  3.   Foul odor from vagina  4.   Mastitis (Breast infection; breast pain, chills, fever, redness)  5.   Urinary pain, burning or frequency  6.   Episiotomy infection  7.   Severe depression longer than 24 hours    HAND WASHING  · Prior to handling the baby.  · Before breastfeeding or bottle feeding baby.  · After using the bathroom or changing the baby's diaper.      VAGINAL CARE  · Nothing inside vagina for 6 weeks: no sexual intercourse, tampons or douching.  · Bleeding may continue for 2-4 weeks.  Amount may vary.    · Call your physician for heavy bleeding which means soaking more than 1 pad per hour    BIRTH CONTROL  · It is possible to become pregnant at any time after delivery and while breastfeeding.  · Plan to discuss a method of birth control with your physician at your follow up visit. visit.    DIET AND ELIMINATION  · Eating more fiber (bran cereal, fruits, and vegetables) and drinking plenty of fluids will help to avoid constipation.  · Urinary frequency after childbirth is normal.    POSTPARTUM BLUES  During the first few  "days after birth, you may experience a sense of the \"blues\" which may include impatience, irritability or even crying.  These feeling come and go quickly.  However, as many as 1 in 10 women experience emotional symptoms known as postpartum depression.    Postpartum depression:  May start as early as the second or third day after delivery or take several weeks or months to develop.  Symptoms of \"blues\" are present, but are more intense:  Crying spells; loss of appetite; feelings of hopelessness or loss of control; fear of touching the baby; over concern or no concern at all about the baby; little or no concern about your own appearance/caring for yourself; and/or inability to sleep or excessive sleeping.  Contact your physician if you are experiencing any of these symptoms.    Crisis Hotline:  · [place] Crisis Hotline:  5-332-BZHTTWR  Or 1-418.674.6542  · [place] Crisis Hotline:  1-637.906.4490  Or 522-311-1214    PREVENTING SHAKEN BABY:  If you are angry or stressed, PUT THE BABY IN THE CRIB, step away, take some deep breaths, and wait until you are calm to care for the baby.  DO NOT SHAKE THE BABY.  You are not alone, call a supporter for help.    · Crisis [facility] Center 24/7 crisis line 051-908-4952 or 1-248.284.9483  · You can also text them, text \"ANSWER\" to 982882    QUIT SMOKING/TOBACCO USE:  I understand the use of any tobacco products increases my chance of suffering from future heart disease and could cause other illnesses which may shorten my life. Quitting the use of tobacco products is the single most important thing I can do to improve my health. For further information on smoking / tobacco cessation call a Toll Free Quit Line at 1-916.904.4050 (*National [employer] [place]) or 1-944.833.4678 (American Lung Association) or you can access the web based program at www.lungusa.org.    · [place] Tobacco Users Help Line:  (593) 845-8543       Toll Free: 1-555.928.9301  · Quit Tobacco Program [facility] " [facility] (800)823-2031    DEPRESSION / SUICIDE RISK:  As you are discharged from this [facility] facility, it is important to learn how to keep safe from harming yourself.    Recognize the warning signs:  · Abrupt changes in personality, positive or negative- including increase in energy   · Giving away possessions  · Change in eating patterns- significant weight changes-  positive or negative  · Change in sleeping patterns- unable to sleep or sleeping all the time   · Unwillingness or inability to communicate  · Depression  · Unusual sadness, discouragement and loneliness  · Talk of wanting to die  · Neglect of personal appearance   · Rebelliousness- reckless behavior  · Withdrawal from people/activities they love  · Confusion- inability to concentrate     If you or a loved one observes any of these behaviors or has concerns about self-harm, here's what you can do:  · Talk about it- your feelings and reasons for harming yourself  · Remove any means that you might use to hurt yourself (examples: pills, rope, extension cords, firearm)  · Get professional help from the community (Mental Health, Substance Abuse, psychological counseling)  · Do not be alone:Call your Safe Contact- someone whom you trust who will be there for you.  · Call your local CRISIS HOTLINE 101-2397 or 416-516-5315  · Call your local Children's Mobile Crisis Response Team [place] (936) 459-1888 or www.Muse  · Call the toll free National Suicide Prevention Hotlines   · National Suicide Prevention Lifeline 507-554-RBZF (0749)  · National Hope Line Network 800-SUICIDE (722-3134)    DISCHARGE SURVEY:  Thank you for choosing [facility].  We hope we provided you with very good care.  You may be receiving a survey in the mail.  Please fill it out.  Your opinion is valuable to us.    ADDITIONAL EDUCATIONAL MATERIALS GIVEN TO PATIENT:        My signature on this form indicates that:  1.  I have reviewed and understand the  above information  2.  My questions regarding this information have been answered to my satisfaction.  3.  I have formulated a plan with my discharge nurse to obtain my prescribed medication for home.

## 2020-08-14 NOTE — PROGRESS NOTES
Maternal discharge held due to infant status.   No changes in maternal status.    Please see discharge summary from 8/13/20

## 2020-11-15 ENCOUNTER — APPOINTMENT (OUTPATIENT)
Dept: RADIOLOGY | Facility: MEDICAL CENTER | Age: 20
End: 2020-11-15
Attending: EMERGENCY MEDICINE
Payer: COMMERCIAL

## 2020-11-15 ENCOUNTER — HOSPITAL ENCOUNTER (EMERGENCY)
Facility: MEDICAL CENTER | Age: 20
End: 2020-11-15
Attending: EMERGENCY MEDICINE
Payer: COMMERCIAL

## 2020-11-15 VITALS
SYSTOLIC BLOOD PRESSURE: 109 MMHG | DIASTOLIC BLOOD PRESSURE: 84 MMHG | OXYGEN SATURATION: 97 % | BODY MASS INDEX: 24.69 KG/M2 | WEIGHT: 144.62 LBS | HEART RATE: 75 BPM | RESPIRATION RATE: 15 BRPM | HEIGHT: 64 IN | TEMPERATURE: 97.4 F

## 2020-11-15 DIAGNOSIS — Z86.59 HISTORY OF DEPRESSION: ICD-10-CM

## 2020-11-15 DIAGNOSIS — R93.89 ENDOMETRIAL THICKENING ON ULTRASOUND: ICD-10-CM

## 2020-11-15 DIAGNOSIS — R10.31 ABDOMINAL PAIN, ACUTE, RIGHT LOWER QUADRANT: ICD-10-CM

## 2020-11-15 DIAGNOSIS — E86.0 DEHYDRATION: ICD-10-CM

## 2020-11-15 LAB
ALBUMIN SERPL BCP-MCNC: 4.5 G/DL (ref 3.2–4.9)
ALBUMIN/GLOB SERPL: 1.8 G/DL
ALP SERPL-CCNC: 89 U/L (ref 30–99)
ALT SERPL-CCNC: 13 U/L (ref 2–50)
ANION GAP SERPL CALC-SCNC: 13 MMOL/L (ref 7–16)
APPEARANCE UR: CLEAR
AST SERPL-CCNC: 10 U/L (ref 12–45)
BASOPHILS # BLD AUTO: 0.6 % (ref 0–1.8)
BASOPHILS # BLD: 0.06 K/UL (ref 0–0.12)
BILIRUB SERPL-MCNC: 0.4 MG/DL (ref 0.1–1.5)
BILIRUB UR QL STRIP.AUTO: NEGATIVE
BUN SERPL-MCNC: 19 MG/DL (ref 8–22)
CALCIUM SERPL-MCNC: 9.6 MG/DL (ref 8.5–10.5)
CHLORIDE SERPL-SCNC: 102 MMOL/L (ref 96–112)
CO2 SERPL-SCNC: 23 MMOL/L (ref 20–33)
COLOR UR: YELLOW
CREAT SERPL-MCNC: 0.64 MG/DL (ref 0.5–1.4)
EOSINOPHIL # BLD AUTO: 0.17 K/UL (ref 0–0.51)
EOSINOPHIL NFR BLD: 1.8 % (ref 0–6.9)
ERYTHROCYTE [DISTWIDTH] IN BLOOD BY AUTOMATED COUNT: 44.3 FL (ref 35.9–50)
GLOBULIN SER CALC-MCNC: 2.5 G/DL (ref 1.9–3.5)
GLUCOSE SERPL-MCNC: 88 MG/DL (ref 65–99)
GLUCOSE UR STRIP.AUTO-MCNC: NEGATIVE MG/DL
HCG SERPL QL: NEGATIVE
HCT VFR BLD AUTO: 44.8 % (ref 37–47)
HGB BLD-MCNC: 14.8 G/DL (ref 12–16)
IMM GRANULOCYTES # BLD AUTO: 0.04 K/UL (ref 0–0.11)
IMM GRANULOCYTES NFR BLD AUTO: 0.4 % (ref 0–0.9)
KETONES UR STRIP.AUTO-MCNC: NEGATIVE MG/DL
LEUKOCYTE ESTERASE UR QL STRIP.AUTO: NEGATIVE
LIPASE SERPL-CCNC: 26 U/L (ref 11–82)
LYMPHOCYTES # BLD AUTO: 2.85 K/UL (ref 1–4.8)
LYMPHOCYTES NFR BLD: 30.8 % (ref 22–41)
MCH RBC QN AUTO: 29.8 PG (ref 27–33)
MCHC RBC AUTO-ENTMCNC: 33 G/DL (ref 33.6–35)
MCV RBC AUTO: 90.3 FL (ref 81.4–97.8)
MICRO URNS: NORMAL
MONOCYTES # BLD AUTO: 0.36 K/UL (ref 0–0.85)
MONOCYTES NFR BLD AUTO: 3.9 % (ref 0–13.4)
NEUTROPHILS # BLD AUTO: 5.76 K/UL (ref 2–7.15)
NEUTROPHILS NFR BLD: 62.5 % (ref 44–72)
NITRITE UR QL STRIP.AUTO: NEGATIVE
NRBC # BLD AUTO: 0 K/UL
NRBC BLD-RTO: 0 /100 WBC
PH UR STRIP.AUTO: 5.5 [PH] (ref 5–8)
PLATELET # BLD AUTO: 287 K/UL (ref 164–446)
PMV BLD AUTO: 10.8 FL (ref 9–12.9)
POTASSIUM SERPL-SCNC: 3.8 MMOL/L (ref 3.6–5.5)
PROT SERPL-MCNC: 7 G/DL (ref 6–8.2)
PROT UR QL STRIP: NEGATIVE MG/DL
RBC # BLD AUTO: 4.96 M/UL (ref 4.2–5.4)
RBC UR QL AUTO: NEGATIVE
SODIUM SERPL-SCNC: 138 MMOL/L (ref 135–145)
SP GR UR STRIP.AUTO: 1.02
UROBILINOGEN UR STRIP.AUTO-MCNC: 0.2 MG/DL
WBC # BLD AUTO: 9.2 K/UL (ref 4.8–10.8)

## 2020-11-15 PROCEDURE — 83690 ASSAY OF LIPASE: CPT

## 2020-11-15 PROCEDURE — 85025 COMPLETE CBC W/AUTO DIFF WBC: CPT

## 2020-11-15 PROCEDURE — 84703 CHORIONIC GONADOTROPIN ASSAY: CPT

## 2020-11-15 PROCEDURE — 36415 COLL VENOUS BLD VENIPUNCTURE: CPT

## 2020-11-15 PROCEDURE — 74177 CT ABD & PELVIS W/CONTRAST: CPT

## 2020-11-15 PROCEDURE — 81003 URINALYSIS AUTO W/O SCOPE: CPT

## 2020-11-15 PROCEDURE — 99284 EMERGENCY DEPT VISIT MOD MDM: CPT

## 2020-11-15 PROCEDURE — 700105 HCHG RX REV CODE 258: Performed by: EMERGENCY MEDICINE

## 2020-11-15 PROCEDURE — 80053 COMPREHEN METABOLIC PANEL: CPT

## 2020-11-15 PROCEDURE — 76856 US EXAM PELVIC COMPLETE: CPT

## 2020-11-15 PROCEDURE — 700117 HCHG RX CONTRAST REV CODE 255: Performed by: EMERGENCY MEDICINE

## 2020-11-15 RX ORDER — SODIUM CHLORIDE, SODIUM LACTATE, POTASSIUM CHLORIDE, CALCIUM CHLORIDE 600; 310; 30; 20 MG/100ML; MG/100ML; MG/100ML; MG/100ML
1000 INJECTION, SOLUTION INTRAVENOUS ONCE
Status: COMPLETED | OUTPATIENT
Start: 2020-11-15 | End: 2020-11-15

## 2020-11-15 RX ADMIN — IOHEXOL 80 ML: 350 INJECTION, SOLUTION INTRAVENOUS at 23:15

## 2020-11-15 RX ADMIN — SODIUM CHLORIDE, POTASSIUM CHLORIDE, SODIUM LACTATE AND CALCIUM CHLORIDE 1000 ML: 600; 310; 30; 20 INJECTION, SOLUTION INTRAVENOUS at 20:51

## 2020-11-16 NOTE — DISCHARGE INSTRUCTIONS
You were seen and evaluated in the Emergency Department at [facility] for:     Sudden onset right lower quadrant abdominal pain.    You had the following tests and studies:    Thankfully your work-up today is very reassuring, your CT scan does not show anything like appendicitis or anything else surgical, your ovaries look normal, he did have a thickened uterine lining this should be rechecked with your gynecologist in 1 month.    You received the following medications:    IV fluids.    ----------------------------    Please make sure to follow up with:    Primary care clinic for recheck and routine health care, see the gynecologist in 4 to 6 weeks for repeat ultrasound to check her uterine lining, but if you have any worsening pain or fevers or vomiting or any other concerns return to the ER immediately.    Good luck, we hope you get better soon!  ----------------------------    We always encourage patients to return IMMEDIATELY if they have:  Increased or changing pain, passing out, fevers over 100.4 (taken in your mouth or rectally) for more than 2 days, redness or swelling of skin or tissues, feeling like your heart is beating fast, chest pain that is new or worsening, trouble breathing, feeling like your throat is closing up and can not breath, inability to walk, weakness of any part of your body, new dizziness, severe bleeding that won't stop from any part of your body, if you can't eat or drink, or if you have any other concerns.   If you feel worse, please know that you can always return with any questions, concerns, worse symptoms, or you are feeling unsafe. We certainly cannot say for sure that we have ruled out every illness or dangerous disease, but we feel that at this specific time, your exam, tests, and vital signs like heart rate and blood pressure are safe for discharge.

## 2020-11-16 NOTE — ED TRIAGE NOTES
"Chief Complaint   Patient presents with   • Flank Pain     R flank/RUQ Radiating to pelvic region x 1 hr. N/V. Hx kidney infection.      R flank pain radiating to RUQ/pelvic region x 1 hr. Hx kidney infection. Recent pregnancy. Denies bloody urine, Denies dysuria.     /54   Pulse 86   Temp 36.4 °C (97.6 °F) (Temporal)   Resp 20   Ht 1.626 m (5' 4\")   Wt 65.6 kg (144 lb 10 oz)   BMI 24.82 kg/m²   "

## 2020-11-16 NOTE — ED PROVIDER NOTES
ED Provider Note    CHIEF COMPLAINT  Chief Complaint   Patient presents with   • Flank Pain     R flank/RUQ Radiating to pelvic region x 1 hr. N/V. Hx kidney infection.        HPI    Primary care provider: None  Means of arrival: POV  History obtained from: Patient  History limited by: Nothing    Caitlynkandy Diamond is a 20 y.o. female who presents with right lower quadrant abdominal pain.  Isolated to her right lower quadrant of her abdomen.  Triage nurse noted that the patient had flank pain but the patient denies any flank or back pain to me.  Symptoms began 1 hour ago at rest.  No falls or injuries or trauma.  No lifting injuries.  Described as a sharp nonradiating pain, it is started to get much better since onset.  It was severe on onset and now is only mild.  No radiation of pain.  No other associated symptoms specifically no nausea, vomiting, dysuria, discharge, chest pain, cough, fevers, or known Covid contact.  No alleviating measures attempted prior to arrival.  No aggravating factors.  Symptoms have been getting better on their own.  She has never had pain like this before prompting her to come in for emergent evaluation.  She has had a kidney infection in the past and felt nothing like this.  No history of kidney stones.  She did have a child several months ago and is still breast-feeding.    REVIEW OF SYSTEMS  Constitutional: Negative for fever or chills.   HENT: Negative for nosebleeds or sore throat.    Eyes: Negative for vision changes or discharge.   Respiratory: Negative for cough or shortness of breath.    Cardiovascular: Negative for chest pain or palpitations.   Gastrointestinal: Negative for nausea, vomiting, but positive for right lower quadrant abdominal pain.   Genitourinary: Negative for dysuria or discharge or flank pain.   Musculoskeletal: Negative for back pain or joint pain.   Skin: Negative for itching or rash.   Neurological: Negative for sensory or motor changes.   See HPI for  "further details. All other systems are negative.     PAST MEDICAL HISTORY   has a past medical history of Depression (6/2/2020).    PAST FAMILY HISTORY  History reviewed. No pertinent family history.    SOCIAL HISTORY  Social History     Tobacco Use   • Smoking status: Never Smoker   • Smokeless tobacco: Never Used   Substance and Sexual Activity   • Alcohol use: Never     Frequency: Never   • Drug use: Not Currently     Types: Inhaled     Comment: Marijuana in past 2 years   • Sexual activity: Yes     Partners: Male     Comment: Planned pregnancy, not  but baby is welcomed. FOB  is iinvolved and  supportive.       SURGICAL HISTORY   has a past surgical history that includes other.    CURRENT MEDICATIONS  Home Medications     Reviewed by Mila Alvarez R.N. (Registered Nurse) on 11/15/20 at 1941  Med List Status: Not Addressed   Medication Last Dose Status   docusate sodium 100 MG Cap  Active   ibuprofen (MOTRIN) 600 MG Tab  Active   Prenatal Vit-Fe Fumarate-FA (PRENATAL VITAMIN) 27-0.8 MG Tab  Active   sertraline (ZOLOFT) 50 MG Tab  Active                ALLERGIES  Allergies   Allergen Reactions   • Pcn [Penicillins] Rash     Rash       PHYSICAL EXAM  VITAL SIGNS: /84   Pulse 75   Temp 36.3 °C (97.4 °F) (Temporal)   Resp 15   Ht 1.626 m (5' 4\")   Wt 65.6 kg (144 lb 10 oz)   SpO2 97%   BMI 24.82 kg/m²    Pulse ox interpretation: On room air, I interpret this pulse ox as normal.  Constitutional: Well-developed, well-nourished. Sitting up in mild distress.   HEENT: Normocephalic, atraumatic. Posterior pharynx clear, mucous membranes dry.  Eyes:  EOMI. Normal sclerae.  Neck: Supple, nontender.  Chest/Pulmonary: Clear to ausculation bilaterally, no wheezes or rhonchi.  Cardiovascular: Regular rate and rhythm, no murmur.   Abdomen: Soft, focal right lower quadrant tenderness; no rebound, guarding, or masses.  Back: No CVA or midline tenderness.   Musculoskeletal: No deformity or edema.  Neuro: Clear " speech, normal coordination, cranial nerves II-XII grossly intact, no focal asymmetry or sensory deficits.   Psych: Normal mood and affect.  Skin: No rashes, warm and dry.      DIAGNOSTIC STUDIES / PROCEDURES    LABS & EKG  Results for orders placed or performed during the hospital encounter of 11/15/20   CBC WITH DIFFERENTIAL   Result Value Ref Range    WBC 9.2 4.8 - 10.8 K/uL    RBC 4.96 4.20 - 5.40 M/uL    Hemoglobin 14.8 12.0 - 16.0 g/dL    Hematocrit 44.8 37.0 - 47.0 %    MCV 90.3 81.4 - 97.8 fL    MCH 29.8 27.0 - 33.0 pg    MCHC 33.0 (L) 33.6 - 35.0 g/dL    RDW 44.3 35.9 - 50.0 fL    Platelet Count 287 164 - 446 K/uL    MPV 10.8 9.0 - 12.9 fL    Neutrophils-Polys 62.50 44.00 - 72.00 %    Lymphocytes 30.80 22.00 - 41.00 %    Monocytes 3.90 0.00 - 13.40 %    Eosinophils 1.80 0.00 - 6.90 %    Basophils 0.60 0.00 - 1.80 %    Immature Granulocytes 0.40 0.00 - 0.90 %    Nucleated RBC 0.00 /100 WBC    Neutrophils (Absolute) 5.76 2.00 - 7.15 K/uL    Lymphs (Absolute) 2.85 1.00 - 4.80 K/uL    Monos (Absolute) 0.36 0.00 - 0.85 K/uL    Eos (Absolute) 0.17 0.00 - 0.51 K/uL    Baso (Absolute) 0.06 0.00 - 0.12 K/uL    Immature Granulocytes (abs) 0.04 0.00 - 0.11 K/uL    NRBC (Absolute) 0.00 K/uL   COMP METABOLIC PANEL   Result Value Ref Range    Sodium 138 135 - 145 mmol/L    Potassium 3.8 3.6 - 5.5 mmol/L    Chloride 102 96 - 112 mmol/L    Co2 23 20 - 33 mmol/L    Anion Gap 13.0 7.0 - 16.0    Glucose 88 65 - 99 mg/dL    Bun 19 8 - 22 mg/dL    Creatinine 0.64 0.50 - 1.40 mg/dL    Calcium 9.6 8.5 - 10.5 mg/dL    AST(SGOT) 10 (L) 12 - 45 U/L    ALT(SGPT) 13 2 - 50 U/L    Alkaline Phosphatase 89 30 - 99 U/L    Total Bilirubin 0.4 0.1 - 1.5 mg/dL    Albumin 4.5 3.2 - 4.9 g/dL    Total Protein 7.0 6.0 - 8.2 g/dL    Globulin 2.5 1.9 - 3.5 g/dL    A-G Ratio 1.8 g/dL   LIPASE   Result Value Ref Range    Lipase 26 11 - 82 U/L   HCG QUAL SERUM   Result Value Ref Range    Beta-Hcg Qualitative Serum Negative Negative    URINALYSIS,CULTURE IF INDICATED    Specimen: Urine   Result Value Ref Range    Color Yellow     Character Clear     Specific Gravity 1.024 <1.035    Ph 5.5 5.0 - 8.0    Glucose Negative Negative mg/dL    Ketones Negative Negative mg/dL    Protein Negative Negative mg/dL    Bilirubin Negative Negative    Urobilinogen, Urine 0.2 Negative    Nitrite Negative Negative    Leukocyte Esterase Negative Negative    Occult Blood Negative Negative    Micro Urine Req see below    ESTIMATED GFR   Result Value Ref Range    GFR If African American >60 >60 mL/min/1.73 m 2    GFR If Non African American >60 >60 mL/min/1.73 m 2       RADIOLOGY  CT-ABDOMEN-PELVIS WITH   Final Result         1.  No acute abnormality.   2.  Hepatomegaly   3.  Trace pericardial effusion      US-PELVIC COMPLETE (TRANSABDOMINAL/TRANSVAGINAL) (COMBO)   Final Result         1.  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  Thickened endometrial stripe, may represent proliferative changes given patient age, consider endometrial pathology with additional follow-up as clinically appropriate          COURSE & MEDICAL DECISION MAKING    This is a 20 y.o. female who presents with sudden onset right lower quadrant abdominal pain.    Differential Diagnosis includes but is not limited to:  Torsion, appendicitis, cyst, obstipation, kidney stone    ED Course:  This is a 20-year-old female several months postpartum coming in with sudden onset right lower quadrant abdominal pain.  Began at rest was very severe right at onset I am most worried about torsion in this patient so I will immediately obtain an ultrasound.  Labs and urine also ordered.  The patient would not like any medications because she is breast-feeding, she does look dehydrated had minimal oral intake today and has dry mucous membranes we will treated with a crystalloid fluid bolus because it was keep her n.p.o. until surgical process is ruled out.    Thankfully lab work-up is reassuring no signs of  infection or blood on urinalysis.  Patient not pregnant doubt ectopic or IUP.  Electrolytes are stable no acidosis.  LFTs and lipase reassuring doubt acute hepatobiliary disease.  No fevers work-up normal doubt serious infection.  No critical anemia.  Transvaginal ultrasound shows no critical disease there is some endometrial thickening.  Plan CT scan given sudden onset of severe pain.    Thankfully CT scan reassuring I see no evidence of appendicitis or hydronephrosis or any other acute surgical disease.  Patient feeling better after fluids she is asymptomatic does not feel she is safe for discharge home.  Unclear etiology, abdominal pain of unknown cause instructions provided, follow-up with gynecology for endometrial thickening within the next month, primary care clinic information given for recheck and routine care.  Return to the ER for any worsening pain, fevers, vomiting, or any other concerns.    Medications   lactated ringers infusion (BOLUS) (0 mL Intravenous Stopped 11/15/20 5829)   iohexol (OMNIPAQUE) 350 mg/mL (80 mL Intravenous Given 11/15/20 3002)       FINAL IMPRESSION  1. Abdominal pain, acute, right lower quadrant    2. History of depression    3. Dehydration    4. Endometrial thickening on ultrasound        PRESCRIPTIONS  Discharge Medication List as of 11/15/2020 11:29 PM          FOLLOW UP  [facility], Emergency Dept  [address] [address] [phone]  Today  If you have ANY new or worse symptoms!    [facility] [address] [address] [phone]  Schedule an appointment as soon as possible for a visit in 2 days  for recheck and routine health care      -DISCHARGE-       Results, exam findings, clinical impression, presumed diagnosis, treatment options, and strict return precautions were discussed with the patient, and they verbalized understanding, agreed with, and appreciated the plan of care.    Pertinent Labs  & Imaging studies reviewed and verified by myself, as well as nursing notes and the patient's past medical, family, and social histories (See chart for details).    Portions of this record were made with voice recognition software.  Despite my review, spelling/grammar/context errors may still remain.  Interpretation of this chart should be taken in this context.    Electronically signed by Juan Rubio M.D. on 11/16/2020 at 1:14 AM.

## 2021-03-01 ENCOUNTER — GYNECOLOGY VISIT (OUTPATIENT)
Dept: OBGYN | Facility: CLINIC | Age: 21
End: 2021-03-01
Payer: COMMERCIAL

## 2021-03-01 VITALS — DIASTOLIC BLOOD PRESSURE: 68 MMHG | SYSTOLIC BLOOD PRESSURE: 124 MMHG | BODY MASS INDEX: 24.72 KG/M2 | WEIGHT: 144 LBS

## 2021-03-01 DIAGNOSIS — N83.209 CYST OF OVARY, UNSPECIFIED LATERALITY: ICD-10-CM

## 2021-03-01 PROBLEM — Z34.80 SUPERVISION OF OTHER NORMAL PREGNANCY, ANTEPARTUM: Status: RESOLVED | Noted: 2020-06-17 | Resolved: 2021-03-01

## 2021-03-01 PROCEDURE — 99214 OFFICE O/P EST MOD 30 MIN: CPT | Performed by: OBSTETRICS & GYNECOLOGY

## 2021-03-01 ASSESSMENT — FIBROSIS 4 INDEX: FIB4 SCORE: 0.2

## 2021-03-01 NOTE — PROGRESS NOTES
GYN Visit  CC: ovarian cyst     Caitlyn Diamond is a 20 y.o.  who presents today for an ovarian cyst.   Patient went to [facility] to have a medical  and on the ultrasound imaging she was told that she had a 3 cm simple cyst.  She was sent here for management.  She is asymptomatic from the standpoint but reports it bothers her knowing it is there and she would like to know what she should do about this.  Given she just had the  she has not yet started on contraception but plans to start pills when she returns for her follow-up appointment with them on 3/5.  Denies any other complaints today.    Patient brings in ultrasound image today which reveals a simple ovarian cyst measuring about 3 cm.    OB History:    OB History    Para Term  AB Living   4 2 2   2 2   SAB TAB Ectopic Molar Multiple Live Births     2     0 2      # Outcome Date GA Lbr Ranjeet/2nd Weight Sex Delivery Anes PTL Lv   4 Term 20 38w3d 01:50 / 00:33 3.62 kg (7 lb 15.7 oz) M Vag-Spont EPI  GWENDOLYN   3 TAB            2 TAB            1 Term 17 40w0d   M Induction EPI  GWENDOLYN       Review of Systems:   Pertinent positives documented in HPI and all other systems reviewed & are negative.    All PMH, PSH, allergies, social history and FH reviewed and updated today:  Past Medical History:  Past Medical History:   Diagnosis Date   • Depression 2020       Past Surgical History:  Past Surgical History:   Procedure Laterality Date   • OTHER      Nose surgery       Medications:   Current Outpatient Medications Ordered in Epic   Medication Sig Dispense Refill   • sertraline (ZOLOFT) 50 MG Tab Take 1 Tab by mouth every day. 30 Tab 11     No current [facility]-ordered facility-administered medications on file.       Allergies: Pcn [penicillins]    Social History:  Social History     Socioeconomic History   • Marital status: Single     Spouse name: Not on file   • Number of children: Not on file   • Years  of education: Not on file   • Highest education level: Not on file   Occupational History   • Not on file   Tobacco Use   • Smoking status: Never Smoker   • Smokeless tobacco: Never Used   Substance and Sexual Activity   • Alcohol use: Never   • Drug use: Not Currently     Types: Inhaled     Comment: Marijuana in past 2 years   • Sexual activity: Yes     Partners: Male     Comment: Planned pregnancy, not  but baby is welcomed. FOB  is iinvolved and  supportive.   Other Topics Concern   • Not on file   Social History Narrative   • Not on file     Social Determinants of Health     Financial Resource Strain:    • Difficulty of Paying Living Expenses:    Food Insecurity: Unknown   • Worried About Running Out of Food in the Last Year: Patient refused   • Ran Out of Food in the Last Year: Patient refused   Transportation Needs: Unknown   • Lack of Transportation (Medical): Patient refused   • Lack of Transportation (Non-Medical): Patient refused   Physical Activity:    • Days of Exercise per Week:    • Minutes of Exercise per Session:    Stress:    • Feeling of Stress :    Social Connections:    • Frequency of Communication with Friends and Family:    • Frequency of Social Gatherings with Friends and Family:    • Attends [employer] Services:    • Active Member of Clubs or Organizations:    • Attends Club or Organization Meetings:    • Marital Status:    Intimate Partner Violence:    • Fear of Current or Ex-Partner:    • Emotionally Abused:    • Physically Abused:    • Sexually Abused:        Family History:  History reviewed. No pertinent family history.        Objective:   Vitals:  /68   Wt 65.3 kg (144 lb)   Body mass index is 24.72 kg/m². (Goal BM I>18 <25)    Physical Exam:   Nursing note and vitals reviewed.  GENERAL: No acute distress  HENT: Atraumatic, normocephalic  EYES: Extraocular movements intact, pupils equal and reactive to light  NECK: Supple, Full ROM  CHEST: No deformities, Equal chest  expansion  RESP: Unlabored, no stridor or audible wheeze  ABD: Soft, Nontender, Non-Distended  Extremities: No Clubbing, Cyanosis, or Edema  Skin: Warm/dry, without rases  Neuro: A/O x 4, CN 2-12 Grossly intact, Motor/sensory grossly intact  Psych: Normal mood, behavior, and affect     Assessment/Plan:   Caitlyn Diamond is a 20 y.o.  female who presents for:    1. Cyst of ovary, unspecified laterality  US-PELVIC COMPLETE (TRANSABDOMINAL/TRANSVAGINAL) (COMBO)     #Simple cyst of ovary.  Outside ultrasound images reviewed by me which reveals a simple appearing cyst.  Discussed that given this size it is very likely physiologic.  Recommend conservative management with repeat imaging.  Discussed that surgical intervention may be necessary but not at this time.  Strongly recommended she start her birth control pills as this can help prevent ovarian cysts.  She would like to wait to start on birth control pills until she goes back to [facility] and she will have them start her on the pills.  We will plan to perform ultrasound in 5 to 6 weeks after which she will return for a follow-up appointment.  #Follow up.  RTC in about 6 weeks or sooner if concerns or questions arise.    Patient was seen for 25 minutes of which > 50% of appointment time was spent on face-to-face counseling and coordination of care regarding the above.

## 2021-03-01 NOTE — NON-PROVIDER
Pt here to discuss under her uterus  Pt states she had an  2 days ago and they found a cyst  Good # [phone]  Pharmacy verified.

## 2022-04-23 ENCOUNTER — HOSPITAL ENCOUNTER (OUTPATIENT)
Facility: MEDICAL CENTER | Age: 22
End: 2022-04-23
Attending: EMERGENCY MEDICINE
Payer: COMMERCIAL

## 2022-04-23 ENCOUNTER — OFFICE VISIT (OUTPATIENT)
Dept: URGENT CARE | Facility: PHYSICIAN GROUP | Age: 22
End: 2022-04-23
Payer: COMMERCIAL

## 2022-04-23 VITALS
TEMPERATURE: 98.6 F | SYSTOLIC BLOOD PRESSURE: 118 MMHG | BODY MASS INDEX: 26.66 KG/M2 | WEIGHT: 160 LBS | DIASTOLIC BLOOD PRESSURE: 82 MMHG | OXYGEN SATURATION: 97 % | RESPIRATION RATE: 16 BRPM | HEIGHT: 65 IN | HEART RATE: 92 BPM

## 2022-04-23 DIAGNOSIS — R39.9 SYMPTOMS INVOLVING URINARY SYSTEM: ICD-10-CM

## 2022-04-23 DIAGNOSIS — Z11.3 SCREENING FOR STD (SEXUALLY TRANSMITTED DISEASE): ICD-10-CM

## 2022-04-23 LAB
APPEARANCE UR: NORMAL
BILIRUB UR STRIP-MCNC: NORMAL MG/DL
COLOR UR AUTO: YELLOW
GLUCOSE UR STRIP.AUTO-MCNC: NORMAL MG/DL
INT CON NEG: NORMAL
INT CON POS: NORMAL
KETONES UR STRIP.AUTO-MCNC: NORMAL MG/DL
LEUKOCYTE ESTERASE UR QL STRIP.AUTO: NORMAL
NITRITE UR QL STRIP.AUTO: NORMAL
PH UR STRIP.AUTO: 5.5 [PH] (ref 5–8)
POC URINE PREGNANCY TEST: NEGATIVE
PROT UR QL STRIP: NORMAL MG/DL
RBC UR QL AUTO: NORMAL
SP GR UR STRIP.AUTO: 1.02
UROBILINOGEN UR STRIP-MCNC: 0.2 MG/DL

## 2022-04-23 PROCEDURE — 87660 TRICHOMONAS VAGIN DIR PROBE: CPT

## 2022-04-23 PROCEDURE — 87529 HSV DNA AMP PROBE: CPT

## 2022-04-23 PROCEDURE — 87389 HIV-1 AG W/HIV-1&-2 AB AG IA: CPT

## 2022-04-23 PROCEDURE — 86780 TREPONEMA PALLIDUM: CPT

## 2022-04-23 PROCEDURE — 87591 N.GONORRHOEAE DNA AMP PROB: CPT

## 2022-04-23 PROCEDURE — 81002 URINALYSIS NONAUTO W/O SCOPE: CPT | Performed by: EMERGENCY MEDICINE

## 2022-04-23 PROCEDURE — 99204 OFFICE O/P NEW MOD 45 MIN: CPT | Performed by: EMERGENCY MEDICINE

## 2022-04-23 PROCEDURE — 87480 CANDIDA DNA DIR PROBE: CPT

## 2022-04-23 PROCEDURE — 87510 GARDNER VAG DNA DIR PROBE: CPT

## 2022-04-23 PROCEDURE — 87491 CHLMYD TRACH DNA AMP PROBE: CPT

## 2022-04-23 PROCEDURE — 81025 URINE PREGNANCY TEST: CPT | Performed by: EMERGENCY MEDICINE

## 2022-04-23 RX ORDER — SULFAMETHOXAZOLE AND TRIMETHOPRIM 800; 160 MG/1; MG/1
1 TABLET ORAL 2 TIMES DAILY
Qty: 6 TABLET | Refills: 0 | Status: SHIPPED | OUTPATIENT
Start: 2022-04-23 | End: 2022-04-23 | Stop reason: SDUPTHER

## 2022-04-23 RX ORDER — SULFAMETHOXAZOLE AND TRIMETHOPRIM 800; 160 MG/1; MG/1
1 TABLET ORAL 2 TIMES DAILY
Qty: 6 TABLET | Refills: 0 | Status: SHIPPED | OUTPATIENT
Start: 2022-04-23 | End: 2022-04-26

## 2022-04-23 RX ORDER — CYCLOBENZAPRINE HCL 5 MG
TABLET ORAL
COMMUNITY
Start: 2022-04-08 | End: 2022-09-14

## 2022-04-23 ASSESSMENT — ENCOUNTER SYMPTOMS
CHANGE IN BOWEL HABIT: 0
NAUSEA: 0
FEVER: 0
ABDOMINAL PAIN: 0
VOMITING: 0
FLANK PAIN: 0

## 2022-04-23 ASSESSMENT — FIBROSIS 4 INDEX: FIB4 SCORE: 0.21

## 2022-04-23 NOTE — PROGRESS NOTES
"Maribell Diamond is a 21 y.o. female who presents with Urinary Frequency (X 3 days, urinary frequency) and Exposure to STD (Possible exposure)            Urinary Frequency  This is a new problem. Episode onset: 3 days. The problem has been unchanged. Associated symptoms include urinary symptoms. Pertinent negatives include no abdominal pain, change in bowel habit, fever, nausea, rash or vomiting.   Exposure to STD  This is a new problem. Episode onset: over one week. Associated symptoms include urinary symptoms. Pertinent negatives include no abdominal pain, change in bowel habit, fever, nausea, rash or vomiting.   History of cystitis, pyelonephritis in pregnancy, BV.  Notes unprotected coital sexual activity about a week and a half ago.  Requesting all STD testing now; including HSV blood test.    Review of Systems   Constitutional: Negative for fever.   Gastrointestinal: Negative for abdominal pain, change in bowel habit, nausea and vomiting.   Genitourinary: Positive for frequency. Negative for dysuria, flank pain, hematuria and urgency.        LMP now.  Denies pregnancy. No vaginal discharge or bleeding.   Skin: Negative for rash.              Objective     /82 (BP Location: Left arm, Patient Position: Sitting, BP Cuff Size: Adult)   Pulse 92   Temp 37 °C (98.6 °F) (Temporal)   Resp 16   Ht 1.651 m (5' 5\")   Wt 72.6 kg (160 lb)   SpO2 97%   BMI 26.63 kg/m²      Physical Exam  Constitutional:       General: She is not in acute distress.     Appearance: She is well-developed. She is not ill-appearing.   Pulmonary:      Effort: Pulmonary effort is normal.   Abdominal:      General: There is no distension.   Skin:     Coloration: Skin is not pale.   Neurological:      Mental Status: She is alert.   Psychiatric:         Behavior: Behavior is cooperative.               Advised of limitations of some testing relative to exposure onset; may require repeat testing.             Assessment " & Plan        1. Symptoms involving urinary system  Trace blood- POCT Urinalysis  negative- POCT Pregnancy  - sulfamethoxazole-trimethoprim (BACTRIM DS) 800-160 MG tablet; Take 1 Tablet by mouth 2 times a day for 3 days.  Dispense: 6 Tablet; Refill: 0    2. Screening for STD (sexually transmitted disease)  - VAGINAL PATHOGENS DNA PANEL; Future  - CHLAMYDIA/GC PCR URINE; Future  - HIV AG/AB COMBO ASSAY SCREENING; Future  - RPR (SYPHILIS); Future  - HSV 1/2 PCR

## 2022-04-24 DIAGNOSIS — Z11.3 SCREENING FOR STD (SEXUALLY TRANSMITTED DISEASE): ICD-10-CM

## 2022-04-24 LAB
CANDIDA DNA VAG QL PROBE+SIG AMP: POSITIVE
G VAGINALIS DNA VAG QL PROBE+SIG AMP: POSITIVE
HIV 1+2 AB+HIV1 P24 AG SERPL QL IA: NORMAL
T PALLIDUM AB SER QL IA: NORMAL
T VAGINALIS DNA VAG QL PROBE+SIG AMP: NEGATIVE

## 2022-04-25 ENCOUNTER — TELEPHONE (OUTPATIENT)
Dept: URGENT CARE | Facility: PHYSICIAN GROUP | Age: 22
End: 2022-04-25
Payer: COMMERCIAL

## 2022-04-25 DIAGNOSIS — B37.31 CANDIDA VAGINITIS: ICD-10-CM

## 2022-04-25 DIAGNOSIS — N76.0 BV (BACTERIAL VAGINOSIS): ICD-10-CM

## 2022-04-25 DIAGNOSIS — B96.89 BV (BACTERIAL VAGINOSIS): ICD-10-CM

## 2022-04-25 LAB
C TRACH DNA GENITAL QL NAA+PROBE: NEGATIVE
N GONORRHOEA DNA GENITAL QL NAA+PROBE: NEGATIVE
SPECIMEN SOURCE: NORMAL

## 2022-04-25 RX ORDER — FLUCONAZOLE 150 MG/1
150 TABLET ORAL ONCE
Qty: 1 TABLET | Refills: 0 | Status: SHIPPED | OUTPATIENT
Start: 2022-04-25 | End: 2022-04-25

## 2022-04-25 NOTE — TELEPHONE ENCOUNTER
Please notify patient of positive tesst for bacterial vaginosis and Candida.  It is a condition of overgrowth of bacteria and yeast causing symptoms.  It is not a sexually transmitted illness, and there are not proven prevention measures.  Treatments are antibiotics; prescriptions have been sent to the pharmacy.  Awaiting results from other tests.  Follow up with PCP as advised.

## 2022-04-27 LAB
HSV DNA SPEC QL NAA+PROBE: NOT DETECTED
SPECIMEN SOURCE: NORMAL

## 2022-09-13 ENCOUNTER — OFFICE VISIT (OUTPATIENT)
Dept: URGENT CARE | Facility: CLINIC | Age: 22
End: 2022-09-13
Payer: COMMERCIAL

## 2022-09-13 ENCOUNTER — HOSPITAL ENCOUNTER (OUTPATIENT)
Facility: MEDICAL CENTER | Age: 22
End: 2022-09-13
Attending: NURSE PRACTITIONER
Payer: COMMERCIAL

## 2022-09-13 VITALS
OXYGEN SATURATION: 94 % | BODY MASS INDEX: 25.27 KG/M2 | TEMPERATURE: 98.6 F | WEIGHT: 148 LBS | HEIGHT: 64 IN | HEART RATE: 85 BPM | RESPIRATION RATE: 16 BRPM | SYSTOLIC BLOOD PRESSURE: 112 MMHG | DIASTOLIC BLOOD PRESSURE: 80 MMHG

## 2022-09-13 DIAGNOSIS — L73.9 FOLLICULITIS: ICD-10-CM

## 2022-09-13 DIAGNOSIS — N89.8 VAGINAL DISCHARGE: ICD-10-CM

## 2022-09-13 LAB
APPEARANCE UR: CLEAR
BILIRUB UR STRIP-MCNC: NEGATIVE MG/DL
COLOR UR AUTO: ABNORMAL
GLUCOSE UR STRIP.AUTO-MCNC: NEGATIVE MG/DL
INT CON NEG: NORMAL
INT CON POS: NORMAL
KETONES UR STRIP.AUTO-MCNC: NEGATIVE MG/DL
LEUKOCYTE ESTERASE UR QL STRIP.AUTO: ABNORMAL
NITRITE UR QL STRIP.AUTO: NEGATIVE
PH UR STRIP.AUTO: 8.5 [PH] (ref 5–8)
POC URINE PREGNANCY TEST: NEGATIVE
PROT UR QL STRIP: 30 MG/DL
RBC UR QL AUTO: NEGATIVE
SP GR UR STRIP.AUTO: 1.01
UROBILINOGEN UR STRIP-MCNC: 2 MG/DL

## 2022-09-13 PROCEDURE — 87491 CHLMYD TRACH DNA AMP PROBE: CPT

## 2022-09-13 PROCEDURE — 99213 OFFICE O/P EST LOW 20 MIN: CPT | Performed by: NURSE PRACTITIONER

## 2022-09-13 PROCEDURE — 81002 URINALYSIS NONAUTO W/O SCOPE: CPT | Performed by: NURSE PRACTITIONER

## 2022-09-13 PROCEDURE — 87480 CANDIDA DNA DIR PROBE: CPT

## 2022-09-13 PROCEDURE — 87510 GARDNER VAG DNA DIR PROBE: CPT

## 2022-09-13 PROCEDURE — 81025 URINE PREGNANCY TEST: CPT | Performed by: NURSE PRACTITIONER

## 2022-09-13 PROCEDURE — 87591 N.GONORRHOEAE DNA AMP PROB: CPT

## 2022-09-13 PROCEDURE — 87660 TRICHOMONAS VAGIN DIR PROBE: CPT

## 2022-09-13 PROCEDURE — 87086 URINE CULTURE/COLONY COUNT: CPT

## 2022-09-13 RX ORDER — DOXYCYCLINE HYCLATE 100 MG
100 TABLET ORAL 2 TIMES DAILY
Qty: 14 TABLET | Refills: 0 | Status: SHIPPED | OUTPATIENT
Start: 2022-09-13 | End: 2022-09-20

## 2022-09-13 ASSESSMENT — FIBROSIS 4 INDEX: FIB4 SCORE: 0.21

## 2022-09-14 ENCOUNTER — TELEPHONE (OUTPATIENT)
Dept: URGENT CARE | Facility: PHYSICIAN GROUP | Age: 22
End: 2022-09-14
Payer: COMMERCIAL

## 2022-09-14 DIAGNOSIS — B96.89 BV (BACTERIAL VAGINOSIS): ICD-10-CM

## 2022-09-14 DIAGNOSIS — N89.8 VAGINAL DISCHARGE: ICD-10-CM

## 2022-09-14 DIAGNOSIS — N76.0 BV (BACTERIAL VAGINOSIS): ICD-10-CM

## 2022-09-14 LAB
C TRACH DNA GENITAL QL NAA+PROBE: NEGATIVE
CANDIDA DNA VAG QL PROBE+SIG AMP: NEGATIVE
G VAGINALIS DNA VAG QL PROBE+SIG AMP: POSITIVE
N GONORRHOEA DNA GENITAL QL NAA+PROBE: NEGATIVE
SPECIMEN SOURCE: NORMAL
T VAGINALIS DNA VAG QL PROBE+SIG AMP: NEGATIVE

## 2022-09-14 RX ORDER — METRONIDAZOLE 500 MG/1
500 TABLET ORAL 2 TIMES DAILY
Qty: 14 TABLET | Refills: 0 | Status: SHIPPED | OUTPATIENT
Start: 2022-09-14 | End: 2022-09-21

## 2022-09-14 ASSESSMENT — ENCOUNTER SYMPTOMS
DIZZINESS: 0
VOMITING: 0
FEVER: 0
SORE THROAT: 0
SHORTNESS OF BREATH: 0
FLANK PAIN: 0
CHILLS: 0
MYALGIAS: 0
NAUSEA: 0
EYE REDNESS: 0

## 2022-09-14 NOTE — PROGRESS NOTES
Subjective:   Caitlyn Diamond is a 21 y.o. female who presents for Vaginal Discharge (Vaginal dc X 3 days, folliculitis )      HPI  Patient is a 21-year-old female presents urgent care for evaluation of a vaginal rash that she is concerned may be folliculitis as she has a history of folliculitis.  Patient states that she recently got a wax in and only her follicles and they are red, itchy and appear to be infected.  She is also been experiencing for the past 3 days vaginal discharge denies any urgency, frequency, discomfort with urination.  Denies any STI exposure.  Patient would like to be tested for STIs on today's exam.    Review of Systems   Constitutional:  Negative for chills and fever.   HENT:  Negative for sore throat.    Eyes:  Negative for redness.   Respiratory:  Negative for shortness of breath.    Cardiovascular:  Negative for chest pain.   Gastrointestinal:  Negative for nausea and vomiting.   Genitourinary:  Negative for dysuria, flank pain, frequency, hematuria and urgency.        Vaginal discharge     Musculoskeletal:  Negative for myalgias.   Skin:  Positive for itching and rash.   Neurological:  Negative for dizziness.     Medications:    doxycycline Tabs    Allergies: Pcn [penicillins]    Problem List: Caitlyn Diamond does not have any pertinent problems on file.    Surgical History:  Past Surgical History:   Procedure Laterality Date    OTHER      Nose surgery       Past Social Hx: Caitlyn Diamond  reports that she has never smoked. She has never used smokeless tobacco. She reports that she does not currently use drugs after having used the following drugs: Inhaled. She reports that she does not drink alcohol.     Past Family Hx:  Caitlyn Diamond family history is not on file.     Problem list, medications, and allergies reviewed by myself today in Epic.     Objective:     /80 (BP Location: Left arm, Patient Position: Sitting, BP Cuff Size: Adult)   Pulse 85    "Temp 37 °C (98.6 °F) (Temporal)   Resp 16   Ht 1.626 m (5' 4\")   Wt 67.1 kg (148 lb)   SpO2 94%   BMI 25.40 kg/m²     Physical Exam  Constitutional:       Appearance: Normal appearance. She is not ill-appearing or toxic-appearing.   HENT:      Head: Normocephalic.      Right Ear: External ear normal.      Left Ear: External ear normal.      Nose: Nose normal.      Mouth/Throat:      Lips: Pink.      Mouth: Mucous membranes are moist.   Eyes:      General: Lids are normal.         Right eye: No discharge.         Left eye: No discharge.   Pulmonary:      Effort: Pulmonary effort is normal. No accessory muscle usage or respiratory distress.   Abdominal:      Tenderness: There is no abdominal tenderness. There is no right CVA tenderness or left CVA tenderness.   Genitourinary:     Comments: Deferred exam  Musculoskeletal:         General: Normal range of motion.      Cervical back: Full passive range of motion without pain.   Skin:     Coloration: Skin is not pale.      Findings: Rash present. Rash is pustular. Rash is not crusting or vesicular.          Neurological:      Mental Status: She is alert and oriented to person, place, and time.   Psychiatric:         Mood and Affect: Mood normal.         Thought Content: Thought content normal.       Assessment/Plan:     Diagnosis and associated orders:     1. Folliculitis  doxycycline (VIBRAMYCIN) 100 MG Tab      2. Vaginal discharge  VAGINAL PATHOGENS DNA PANEL    Chlamydia/GC, PCR (Genital/Anal swab)    POCT Urinalysis    POCT Pregnancy    URINE CULTURE(NEW)         Comments/MDM:     Results for orders placed or performed in visit on 09/13/22   POCT Urinalysis   Result Value Ref Range    POC Color Dark Yellow Negative    POC Appearance Clear Negative    POC Leukocyte Esterase Small Negative    POC Nitrites Negative Negative    POC Urobiligen 2.0 Negative (0.2) mg/dL    POC Protein 30 Negative mg/dL    POC Urine PH 8.5 (A) 5.0 - 8.0    POC Blood Negative Negative    " POC Specific Gravity 1.015 <1.005 - >1.030    POC Ketones Negative Negative mg/dL    POC Bilirubin negative Negative mg/dL    POC Glucose Negative Negative mg/dL   POCT Pregnancy   Result Value Ref Range    POC Urine Pregnancy Test Negative Negative    Internal Control Positive Valid     Internal Control Negative Valid      I personally reviewed prior external notes and prior test results pertinent to today's visit.  Patient does appear to have folliculitis likely secondary to wax we will treat with doxycycline.  We will follow-up with pending lab results and treat as indicated.\  Discussed management options, risks and benefits, and alternatives to treatment plan agreed upon.   Red flags discussed and indications to immediately call 911 or present to the Emergency Department.   Supportive care, differential diagnoses, and indications for immediate follow-up discussed with patient.    Patient expresses understanding and agrees to plan. Patient denies any other questions or concerns.                Please note that this dictation was created using voice recognition software. I have made a reasonable attempt to correct obvious errors, but I expect that there are errors of grammar and possibly content that I did not discover before finalizing the note.    This note was electronically signed by Juan Alberto SOUTH.

## 2022-09-16 LAB
BACTERIA UR CULT: NORMAL
SIGNIFICANT IND 70042: NORMAL
SITE SITE: NORMAL
SOURCE SOURCE: NORMAL

## 2022-12-23 ENCOUNTER — APPOINTMENT (OUTPATIENT)
Dept: URGENT CARE | Facility: CLINIC | Age: 22
End: 2022-12-23
Payer: COMMERCIAL

## 2023-02-02 ENCOUNTER — OFFICE VISIT (OUTPATIENT)
Dept: MEDICAL GROUP | Facility: IMAGING CENTER | Age: 23
End: 2023-02-02
Payer: COMMERCIAL

## 2023-02-02 ENCOUNTER — HOSPITAL ENCOUNTER (OUTPATIENT)
Dept: LAB | Facility: MEDICAL CENTER | Age: 23
End: 2023-02-02
Payer: COMMERCIAL

## 2023-02-02 VITALS
TEMPERATURE: 98.2 F | SYSTOLIC BLOOD PRESSURE: 102 MMHG | HEART RATE: 78 BPM | HEIGHT: 64 IN | RESPIRATION RATE: 16 BRPM | OXYGEN SATURATION: 96 % | DIASTOLIC BLOOD PRESSURE: 64 MMHG | BODY MASS INDEX: 26.15 KG/M2 | WEIGHT: 153.2 LBS

## 2023-02-02 DIAGNOSIS — L28.2 PRURITIC RASH: ICD-10-CM

## 2023-02-02 DIAGNOSIS — Z11.3 SCREENING EXAMINATION FOR SEXUALLY TRANSMITTED DISEASE: ICD-10-CM

## 2023-02-02 DIAGNOSIS — Z76.89 ENCOUNTER TO ESTABLISH CARE: ICD-10-CM

## 2023-02-02 DIAGNOSIS — Z13.79 GENETIC TESTING: ICD-10-CM

## 2023-02-02 PROBLEM — F32.A DEPRESSION: Status: RESOLVED | Noted: 2020-06-02 | Resolved: 2023-02-02

## 2023-02-02 PROCEDURE — 36415 COLL VENOUS BLD VENIPUNCTURE: CPT

## 2023-02-02 PROCEDURE — 86694 HERPES SIMPLEX NES ANTBDY: CPT

## 2023-02-02 PROCEDURE — 87491 CHLMYD TRACH DNA AMP PROBE: CPT

## 2023-02-02 PROCEDURE — 86695 HERPES SIMPLEX TYPE 1 TEST: CPT

## 2023-02-02 PROCEDURE — 87591 N.GONORRHOEAE DNA AMP PROB: CPT

## 2023-02-02 PROCEDURE — 86803 HEPATITIS C AB TEST: CPT

## 2023-02-02 PROCEDURE — 87340 HEPATITIS B SURFACE AG IA: CPT

## 2023-02-02 PROCEDURE — 86780 TREPONEMA PALLIDUM: CPT

## 2023-02-02 PROCEDURE — 99213 OFFICE O/P EST LOW 20 MIN: CPT

## 2023-02-02 PROCEDURE — 86704 HEP B CORE ANTIBODY TOTAL: CPT

## 2023-02-02 PROCEDURE — 86706 HEP B SURFACE ANTIBODY: CPT

## 2023-02-02 PROCEDURE — 86696 HERPES SIMPLEX TYPE 2 TEST: CPT

## 2023-02-02 PROCEDURE — 87389 HIV-1 AG W/HIV-1&-2 AB AG IA: CPT

## 2023-02-02 RX ORDER — NITROFURANTOIN 25; 75 MG/1; MG/1
CAPSULE ORAL
COMMUNITY
Start: 2023-01-19 | End: 2023-09-06

## 2023-02-02 ASSESSMENT — PAIN SCALES - GENERAL: PAINLEVEL: NO PAIN

## 2023-02-02 ASSESSMENT — PATIENT HEALTH QUESTIONNAIRE - PHQ9: CLINICAL INTERPRETATION OF PHQ2 SCORE: 0

## 2023-02-02 NOTE — PROGRESS NOTES
Chief Complaint   Patient presents with    Establish Care    Rash     Pt states she noticed the rash yesterday on her chest area     Sexually Transmitted Diseases     STD screening        HISTORY OF THE PRESENT ILLNESS: Patient is a 22 y.o. female. This pleasant patient is here today to establish care and to discuss    No previous PCP, she admits to going to  for care.    Rash on right breast  Onset of symptoms started yesterday. She reports that it was itchy.  She cleaned the area with bleach which irritated the area even more. Today, it is feeling better. She is concern that it could be herpes lesions. She reports to possible skin exposure with known individual that has herpes. She states that her partner had scabbed lesions on his genitals and his genital might have touched her breast while giving oral sex. Exposure was at 6 am and lesion developed later that day.   She is requesting STI screening test.    Ob-Gyn/ History:    Patient has GYN provider: no  /Para:    Last Pap Smear:  . no history of abnormal pap smears.  Gyn Surgery:  no.  Current Contraceptive Method:  condoms. yes currently sexually active.  Last menstrual period:  .  Periods regular. moderate bleeding. Cramping is moderate.   She does not take OTC analgesics for cramps.  No significant bloating/fluid retention, pelvic pain, or dyspareunia. No vaginal discharge  Post-menopausal bleeding: no  Urinary incontinence: no  Folate intake: yes     Health Maintenance  Cholesterol Screening: n/a   Diabetes Screening: n/a   Aspirin Use: n/a    Diet: well balanced meal   Exercise: stays active   Substance Abuse: no   Safe in relationship. yes   Seat belts, bike helmet, gun safety discussed.  Sun protection used.  Dentist: last seen in   Eye Doctor: no, recommend    Cancer screening  Cervical cancer: reports last pap was in   with normal results    Infectious disease screening/Immunizations  --STI Screening: ordered   --Practices  safe sex.  --HIV Screening: ordered   --Hepatitis C Screening: ordered   --Immunizations:    Influenza: discussed and declined at this time, will revisit on next f/u     HPV:  n/a    Tetanus: up to date    Shingles: n/a    Pneumococcal : n/a       COVID-19: discussed and declined at this time, will revisit on next f/u   Other immunizations: discussed and declined at this time, will revisit on next f/u         Allergies: Pcn [penicillins]    Current Outpatient Medications Ordered in Epic   Medication Sig Dispense Refill    nitrofurantoin (MACROBID) 100 MG Cap TAKE 1 CAPSULE BY MOUTH TWICE A DAY FOR 7 DAYS       No current [facility]-ordered facility-administered medications on file.       Past Medical History:   Diagnosis Date    Depression 6/2/2020       Past Surgical History:   Procedure Laterality Date    LIPOSUCTION      MAMMOPLASTY AUGMENTATION      OTHER      Nose surgery       Social History     Tobacco Use    Smoking status: Never    Smokeless tobacco: Never   Vaping Use    Vaping Use: Some days    Substances: Nicotine   Substance Use Topics    Alcohol use: Yes     Comment: social    Drug use: Not Currently     Types: Inhaled, Marijuana     Comment: Marijuana in past 2 years       Social History     Social History Narrative    Not on file       Family History   Problem Relation Age of Onset    Breast Cancer Maternal Grandmother     Cancer Maternal Grandmother     Cancer Maternal Grandfather      ROS:     - Constitutional: Negative for fever, chills, unexpected weight change, and fatigue/generalized weakness.     - HEENT: Negative for headaches, vision changes, hearing changes, ear pain, ear discharge, rhinorrhea, sinus congestion, sore throat, and neck pain.      - Respiratory: Negative for cough, sputum production, chest congestion, dyspnea, wheezing, and crackles.      - Cardiovascular: Negative for chest pain, palpitations, orthopnea, and bilateral lower extremity edema.     - Gastrointestinal: Negative for  "heartburn, nausea, vomiting, abdominal pain, hematochezia, melena, diarrhea, constipation, and greasy/foul-smelling stools.     - Genitourinary: Negative for dysuria, polyuria, hematuria, pyuria, urinary urgency, and urinary incontinence.     - Musculoskeletal: Negative for myalgias, back pain, and joint pain.     - Skin:  Positive for rash, itching. Negative cyanotic skin color change.     - Neurological: Negative for dizziness, tingling, tremors, focal sensory deficit, focal weakness and headaches.     - Endo/Heme/Allergies: Does not bruise/bleed easily.     - Psychiatric/Behavioral: Negative for depression, suicidal/homicidal ideation and memory loss.        - NOTE: All other systems reviewed and are negative, except as in HPI.      Exam: /64 (BP Location: Left arm, Patient Position: Sitting, BP Cuff Size: Adult)   Pulse 78   Temp 36.8 °C (98.2 °F) (Temporal)   Resp 16   Ht 1.626 m (5' 4\")   Wt 69.5 kg (153 lb 3.2 oz)   SpO2 96%  Body mass index is 26.3 kg/m².    Physical Exam:    Constitutional: Well-developed and well-nourished. Not diaphoretic. No distress.   Skin: Skin is warm and dry. No rash noted.  Head: Atraumatic without lesions.  Eyes: Conjunctivae and extraocular motions are normal. Pupils are equal, round, and reactive to light. No scleral icterus.   Ears:  External ears unremarkable. Tympanic membranes clear and intact.  Nose: Nares patent. Septum midline. Turbinates without erythema nor edema. No discharge.   Mouth/Throat: Tongue normal. Oropharynx is clear and moist. Posterior pharynx without erythema or exudates.  Neck: Supple, trachea midline. Normal range of motion. No thyromegaly present. No lymphadenopathy--cervical or supraclavicular.  Cardiovascular: Regular rate and rhythm, S1 and S2 without murmur, rubs, or gallops.  Lungs: Normal inspiratory effort, CTA bilaterally, no wheezes/rhonchi/rales  Abdomen: Soft, non tender, and without distention. Active bowel sounds in all four " quadrants. No rebound, guarding, masses or HSM.  Extremities: No cyanosis, clubbing, erythema, nor edema. Distal pulses intact and symmetric.   Musculoskeletal: All major joints AROM full in all directions without pain.  Neurological: Alert and oriented x 3. No cranial nerve deficit. 5/5 myotomes. Sensation intact.   Psychiatric:  Behavior, mood, and affect are appropriate.  Physical Exam  Chest:          Comments: 0.5 mm raised erythematous papule without vesicles X3, no drainage noted.      Please note that this dictation was created using voice recognition software. I have made every reasonable attempt to correct obvious errors, but I expect that there are errors of grammar and possibly content that I did not discover before finalizing the note.      Assessment/Plan    22 y.o. female with the following -  1. Encounter to establish care  And is here to establish care.  No previous PCP.    2. Screening examination for sexually transmitted disease  Patient requesting STI screening.  She reports of possible skin exposure with known herpes infected person.    - HIV AG/AB Combo Assay Screening; Future  - T.Pallidum AB DEYA (Screening); Future  - Trichomonas Vaginalis by TMA  - Hepatitis C Virus Antibody; Future  - HEP B Surface Antibody; Future  - Hep B Core AB Total; Future  - Hep B Surface Antigen; Future  - HSV 1/2 IGG W/ TYPE SPECIFIC RFLX; Future  - Chlamydia/GC, PCR (Urine); Future    3. Pruritic Rash  New onset.  Patient presentation likely dermatitis vs bug bite vs folliculitis. Low suspicion for  herpetic lesions; however, she report of possible skin exposure with known herpes infected person. NO drainage or open areas; therefore unable to obtain would culture based on rash presentation.  Instructed patient to monitor rash.  For worsening symptoms such as severe pain, burning, or vesicle development, to follow-up in office to obtain culture and treat empirically with antiviral medication.  Recommend to continue  with good skin hygiene, avoid using harsh chemicals to area, may use OTC hydrocortisone ointment to affected areas.    4. Genetic testing    - Referral to Genetic Research Studies    Medical Decision Making/Course:  In the course of preparing for this visit with review of the pertinent past medical history, recent and past clinic visits, current medications, and performing chart, immunization, medical history and medication reconciliation, and in the further course of obtaining the current history pertinent to the clinic visit today, performing an exam and evaluation, ordering and independently evaluating labs, tests, and/or procedures, prescribing any recommended new medications as noted above, providing any pertinent counseling and education and recommending further coordination of care. This was discussed with patient in a shared-decision making conversation, and they understand and agreed with plan of care.     Return if symptoms worsen or fail to improve.    Thank you, Nandini SOUTH  [facility]    Please note that this dictation was created using voice recognition software. I have made every reasonable attempt to correct obvious errors, but I expect that there are errors of grammar and possibly content that I did not discover before finalizing the note.

## 2023-02-03 LAB
C TRACH DNA SPEC QL NAA+PROBE: NEGATIVE
HBV CORE AB SERPL QL IA: NONREACTIVE
HBV SURFACE AB SERPL IA-ACNC: 5.34 MIU/ML (ref 0–10)
HBV SURFACE AG SER QL: NORMAL
HCV AB SER QL: NORMAL
N GONORRHOEA DNA SPEC QL NAA+PROBE: NEGATIVE
SPECIMEN SOURCE: NORMAL
T PALLIDUM AB SER QL IA: NORMAL

## 2023-02-04 LAB
HSV1 GG IGG SER-ACNC: 60.1 IV
HSV1+2 IGG SER IA-ACNC: >22.4 IV
HSV2 GG IGG SER-ACNC: 0.1 IV

## 2023-02-07 ENCOUNTER — TELEPHONE (OUTPATIENT)
Dept: MEDICAL GROUP | Facility: IMAGING CENTER | Age: 23
End: 2023-02-07
Payer: COMMERCIAL

## 2023-02-07 LAB — HIV 1+2 AB+HIV1 P24 AG SERPL QL IA: NORMAL

## 2023-02-07 NOTE — TELEPHONE ENCOUNTER
Called patient about no show on 02.06.23. Left message with patient to reschedule at earliest convenience.

## 2023-02-10 ENCOUNTER — TELEMEDICINE (OUTPATIENT)
Dept: MEDICAL GROUP | Facility: IMAGING CENTER | Age: 23
End: 2023-02-10
Payer: COMMERCIAL

## 2023-02-10 VITALS — WEIGHT: 150 LBS | BODY MASS INDEX: 25.75 KG/M2

## 2023-02-10 DIAGNOSIS — L73.9 FOLLICULITIS: ICD-10-CM

## 2023-02-10 PROCEDURE — 99213 OFFICE O/P EST LOW 20 MIN: CPT | Mod: 95

## 2023-02-10 RX ORDER — CLINDAMYCIN AND BENZOYL PEROXIDE 10; 50 MG/G; MG/G
1 GEL TOPICAL 2 TIMES DAILY
Qty: 35 G | Refills: 0 | Status: SHIPPED | OUTPATIENT
Start: 2023-02-10 | End: 2023-04-07 | Stop reason: SDUPTHER

## 2023-02-10 RX ORDER — DOXYCYCLINE HYCLATE 100 MG
100 TABLET ORAL 2 TIMES DAILY
Qty: 10 TABLET | Refills: 0 | Status: SHIPPED | OUTPATIENT
Start: 2023-02-10 | End: 2023-02-15

## 2023-02-10 NOTE — PROGRESS NOTES
Virtual Visit: Established Patient   This visit was conducted via Zoom using secure and encrypted videoconferencing technology.   The patient was in their home in the [facility].    The patient's identity was confirmed and verbal consent was obtained for this virtual visit.    Subjective:   CC:   Chief Complaint   Patient presents with    Other     Folliculitis- Pt had a wax and ever since then has ingrown hairs     Caitlyn Diamond is a 22 y.o. female presenting for evaluation and management of:    Folliculitis  Onset of symptoms started 2 days ago after getting a wax. She developed ingrown hair in pubic and buttocks area that became inflamed and turned into folliculitis.  The lesions are becoming more inflamed and painful.  Her symptoms are worsening. Denies any drainage.    She has not applied any topical ointment for this. This is a recurrent issue for her which is treated with oral abx and has been effective.    ROS:  No fevers, chills, lethargy.      Current medicines (including changes today)  Current Outpatient Medications   Medication Sig Dispense Refill    doxycycline (VIBRAMYCIN) 100 MG Tab Take 1 Tablet by mouth 2 times a day for 5 days. 10 Tablet 0    clindamycin-benzoyl peroxide (BENZACLIN) gel Apply 1 Application topically 2 times a day. 35 g 0    nitrofurantoin (MACROBID) 100 MG Cap TAKE 1 CAPSULE BY MOUTH TWICE A DAY FOR 7 DAYS       No current facility-administered medications for this visit.       Patient Active Problem List    Diagnosis Date Noted    History of incarceration 06/02/2020    Dysthymic disorder 04/23/2015        Objective:   Wt 68 kg (150 lb)   LMP 01/21/2023 (Approximate)   BMI 25.75 kg/m²     Physical Exam:  Constitutional: Alert, no distress, well-groomed.  Skin: No rashes in visible areas.  Eye: Round. Conjunctiva clear, lids normal. No icterus.   ENMT: Lips pink without lesions, good dentition, moist mucous membranes. Phonation normal.  Neck: No masses, no  thyromegaly. Moves freely without pain.  Respiratory: Unlabored respiratory effort, no cough or audible wheeze  Psych: Alert and oriented x3, normal affect and mood.     Assessment and Plan:   The following treatment plan was discussed:     1. Folliculitis  Acute on chronic condition.  With recent flareup after getting a wax.  Will treat with topical antibiotics, BenzaClin.  Administration and side effects discussed.  Given the severity of her symptoms, will also treat empirically with doxycycline.  Administration and side effects discussed.  Recommend gentle hygiene and avoid excessive scrubbing and harsh products to affected areas.  For worsening symptoms, advised to follow-up in clinic.    - doxycycline (VIBRAMYCIN) 100 MG Tab; Take 1 Tablet by mouth 2 times a day for 5 days.  Dispense: 10 Tablet; Refill: 0  - clindamycin-benzoyl peroxide (BENZACLIN) gel; Apply 1 Application topically 2 times a day.  Dispense: 35 g; Refill: 0    Medical Decision Making/Course:  In the course of preparing for this visit with review of the pertinent past medical history, recent and past clinic visits, current medications, and performing chart, immunization, medical history and medication reconciliation, and in the further course of obtaining the current history pertinent to the clinic visit today, performing an exam and evaluation, ordering and independently evaluating labs, tests, and/or procedures, prescribing any recommended new medications as noted above, providing any pertinent counseling and education and recommending further coordination of care. This was discussed with patient in a shared-decision making conversation, and they understand and agreed with plan of care.     Follow-up: Return if symptoms worsen or fail to improve.    Thank you, Nandini SOUTH  [facility]

## 2023-04-07 ENCOUNTER — OFFICE VISIT (OUTPATIENT)
Dept: MEDICAL GROUP | Facility: IMAGING CENTER | Age: 23
End: 2023-04-07
Payer: COMMERCIAL

## 2023-04-07 ENCOUNTER — HOSPITAL ENCOUNTER (OUTPATIENT)
Dept: RADIOLOGY | Facility: MEDICAL CENTER | Age: 23
End: 2023-04-07
Payer: COMMERCIAL

## 2023-04-07 VITALS
HEIGHT: 64 IN | BODY MASS INDEX: 26.91 KG/M2 | WEIGHT: 157.6 LBS | TEMPERATURE: 97.8 F | DIASTOLIC BLOOD PRESSURE: 50 MMHG | SYSTOLIC BLOOD PRESSURE: 108 MMHG

## 2023-04-07 DIAGNOSIS — L73.9 FOLLICULITIS: ICD-10-CM

## 2023-04-07 DIAGNOSIS — M79.605 LEFT LEG PAIN: ICD-10-CM

## 2023-04-07 PROCEDURE — 99214 OFFICE O/P EST MOD 30 MIN: CPT

## 2023-04-07 PROCEDURE — 93971 EXTREMITY STUDY: CPT | Mod: LT

## 2023-04-07 RX ORDER — CLINDAMYCIN AND BENZOYL PEROXIDE 10; 50 MG/G; MG/G
1 GEL TOPICAL 2 TIMES DAILY
Qty: 35 G | Refills: 0 | Status: SHIPPED | OUTPATIENT
Start: 2023-04-07 | End: 2023-07-08 | Stop reason: SDUPTHER

## 2023-04-07 RX ORDER — OXYCODONE HYDROCHLORIDE 5 MG/1
1 TABLET ORAL EVERY 6 HOURS PRN
COMMUNITY
Start: 2023-04-05 | End: 2023-09-06

## 2023-04-07 RX ORDER — IBUPROFEN 800 MG/1
800 TABLET ORAL 3 TIMES DAILY PRN
COMMUNITY
Start: 2023-04-05 | End: 2023-08-01

## 2023-04-07 NOTE — PROGRESS NOTES
Chief Complaint   Patient presents with    Leg Pain     Pt reported left leg- level 6-7      HISTORY OF THE PRESENT ILLNESS: Patient is a 22 y.o. female. This pleasant patient is here today to discuss:    Left leg pain  Patient s/p D&C procedure on 3/31 in [place]; however, procedure was incomplete and required emergency repeat of D&C on 4/3 at [facility].   Patient started to develop left leg pain soon after her first D&C.  Her symptoms located at the left upper posterior thigh.  She describes having constant sharp pain to touch and movement.  She is taking oxycodone and ibuprofen for her procedure.  However, her symptoms are worsening.  She is concerned  that this could be a blood clot.  She denies dizziness, palpitation, shortness of breath, chest pain, or syncopal episode.  She denies swelling, cold extremity, numbness/tingling/weakness, skin discoloration of left leg.  She denies fevers, chills, lethargy, or unintentional weight loss.    Allergies: Pcn [penicillins]    Current Outpatient Medications Ordered in Epic   Medication Sig Dispense Refill    ibuprofen (MOTRIN) 800 MG Tab Take 800 mg by mouth 3 times a day as needed. for pain      oxyCODONE immediate-release (ROXICODONE) 5 MG Tab Take 1 Tablet by mouth every 6 hours as needed.      clindamycin-benzoyl peroxide (BENZACLIN) gel Apply 1 Application. topically 2 times a day. 35 g 0    nitrofurantoin (MACROBID) 100 MG Cap TAKE 1 CAPSULE BY MOUTH TWICE A DAY FOR 7 DAYS       No current [facility]-ordered facility-administered medications on file.       Past Medical History:   Diagnosis Date    Depression 6/2/2020       Past Surgical History:   Procedure Laterality Date    LIPOSUCTION      MAMMOPLASTY AUGMENTATION      OTHER      Nose surgery       Social History     Tobacco Use    Smoking status: Never    Smokeless tobacco: Never   Vaping Use    Vaping Use: Never used   Substance Use Topics    Alcohol use: Yes     Comment: social    Drug use: Not Currently     " Types: Inhaled, Marijuana     Comment: Marijuana in past 2 years       Social History     Social History Narrative    Not on file       Family History   Problem Relation Age of Onset    Breast Cancer Maternal Grandmother     Cancer Maternal Grandmother     Cancer Maternal Grandfather      Gen: no fevers/chills  Pulm: no sob, no cough  CV: no chest pain, no palpitations, no edema  GI: no nausea/vomiting, no diarrhea  Skin: no rash    Exam: /50 (BP Location: Left arm, Patient Position: Sitting, BP Cuff Size: Adult)   Temp 36.6 °C (97.8 °F)   Ht 1.626 m (5' 4\")   Wt 71.5 kg (157 lb 9.6 oz)  Body mass index is 27.05 kg/m².    General: Normal appearing. No distress.  HEENT: Normocephalic. Eyes conjunctiva clear lids without ptosis, pupils equal and reactive to light accommodation, ears normal shape and contour, canals are clear bilaterally, tympanic membranes are benign, nasal mucosa benign, oropharynx is without erythema, edema or exudates.   Neck: Supple without JVD or bruit. Thyroid is not enlarged.  Pulmonary: Clear to ausculation.  Normal effort. No rales, ronchi, or wheezing.  Cardiovascular: Regular rate and rhythm without murmur. Carotid and radial pulses are intact and equal bilaterally.  Abdomen: Soft, nontender, nondistended. Normal bowel sounds. Liver and spleen are not palpable  Neurologic: Grossly nonfocal  Lymph: No cervical, supraclavicular or axillary lymph nodes are palpable  Skin: Warm and dry.  No obvious lesions.  Musculoskeletal: Normal gait. No extremity cyanosis, clubbing, or edema. Tenderness over left posterior thigh. No masses or nodules palpated. Cap refill <3 sec.   Psych: Normal mood and affect. Alert and oriented x3. Judgment and insight is normal.    Please note that this dictation was created using voice recognition software. I have made every reasonable attempt to correct obvious errors, but I expect that there are errors of grammar and possibly content that I did not discover " before finalizing the note.      Assessment/Plan    22 y.o. female with the following -    1. Left leg pain  Acute onset after D&C procedure.  Patient presentation is concerning for DVT.  Physical examination unremarkable.  No s/s limb threat or cellulitis.  Will order ultrasound to evaluate.  Will order labs to rule out hematoma or bleeding.   For worsening symptoms, instructed patient to go to ED for immediate care.    - US-EXTREMITY VENOUS LOWER UNILAT LEFT; Future  - CBC WITHOUT DIFFERENTIAL; Future  - Prothrombin Time; Future    2. Folliculitis  Established and stable condition on BenzaClin.  Med refill sent to pharmacy.  Medication administration and side effects discussed.    - clindamycin-benzoyl peroxide (BENZACLIN) gel; Apply 1 Application. topically 2 times a day.  Dispense: 35 g; Refill: 0    Medical Decision Making/Course:  In the course of preparing for this visit with review of the pertinent past medical history, recent and past clinic visits, current medications, and performing chart, immunization, medical history and medication reconciliation, and in the further course of obtaining the current history pertinent to the clinic visit today, performing an exam and evaluation, ordering and independently evaluating labs, tests, and/or procedures, prescribing any recommended new medications as noted above, providing any pertinent counseling and education and recommending further coordination of care. This was discussed with patient in a shared-decision making conversation, and they understand and agreed with plan of care.     Return if symptoms worsen or fail to improve.    Thank you, Nandini SOUTH  [facility]    Please note that this dictation was created using voice recognition software. I have made every reasonable attempt to correct obvious errors, but I expect that there are errors of grammar and possibly content that I did not discover before finalizing the note.

## 2023-04-26 ENCOUNTER — OFFICE VISIT (OUTPATIENT)
Dept: MEDICAL GROUP | Facility: IMAGING CENTER | Age: 23
End: 2023-04-26
Payer: COMMERCIAL

## 2023-04-26 ENCOUNTER — HOSPITAL ENCOUNTER (OUTPATIENT)
Facility: MEDICAL CENTER | Age: 23
End: 2023-04-26
Payer: COMMERCIAL

## 2023-04-26 VITALS
HEART RATE: 72 BPM | DIASTOLIC BLOOD PRESSURE: 68 MMHG | WEIGHT: 153.8 LBS | SYSTOLIC BLOOD PRESSURE: 96 MMHG | TEMPERATURE: 97.8 F | OXYGEN SATURATION: 97 % | BODY MASS INDEX: 26.26 KG/M2 | HEIGHT: 64 IN | RESPIRATION RATE: 16 BRPM

## 2023-04-26 DIAGNOSIS — N89.8 VAGINAL DISCHARGE: ICD-10-CM

## 2023-04-26 DIAGNOSIS — N92.6 MISSED PERIOD: ICD-10-CM

## 2023-04-26 DIAGNOSIS — R10.2 SUPRAPUBIC PAIN: ICD-10-CM

## 2023-04-26 DIAGNOSIS — Z20.2 POSSIBLE EXPOSURE TO STD: ICD-10-CM

## 2023-04-26 DIAGNOSIS — R30.0 DYSURIA: ICD-10-CM

## 2023-04-26 DIAGNOSIS — R10.2 PELVIC PAIN: ICD-10-CM

## 2023-04-26 DIAGNOSIS — Z11.3 SCREENING EXAMINATION FOR SEXUALLY TRANSMITTED DISEASE: ICD-10-CM

## 2023-04-26 DIAGNOSIS — Z20.2 POSSIBLE EXPOSURE TO STD: Primary | ICD-10-CM

## 2023-04-26 LAB
APPEARANCE UR: NORMAL
BILIRUB UR STRIP-MCNC: NORMAL MG/DL
COLOR UR AUTO: NORMAL
GLUCOSE UR STRIP.AUTO-MCNC: NORMAL MG/DL
KETONES UR STRIP.AUTO-MCNC: NORMAL MG/DL
LEUKOCYTE ESTERASE UR QL STRIP.AUTO: NORMAL
NITRITE UR QL STRIP.AUTO: NORMAL
PH UR STRIP.AUTO: 7 [PH] (ref 5–8)
POCT INT CON NEG: NEGATIVE
POCT INT CON POS: NEGATIVE
POCT URINE PREGNANCY TEST: NEGATIVE
PROT UR QL STRIP: NORMAL MG/DL
RBC UR QL AUTO: NORMAL
SP GR UR STRIP.AUTO: 1.01
UROBILINOGEN UR STRIP-MCNC: 0.2 MG/DL

## 2023-04-26 PROCEDURE — 87660 TRICHOMONAS VAGIN DIR PROBE: CPT

## 2023-04-26 PROCEDURE — 81025 URINE PREGNANCY TEST: CPT

## 2023-04-26 PROCEDURE — 81002 URINALYSIS NONAUTO W/O SCOPE: CPT

## 2023-04-26 PROCEDURE — 87480 CANDIDA DNA DIR PROBE: CPT

## 2023-04-26 PROCEDURE — 87510 GARDNER VAG DNA DIR PROBE: CPT

## 2023-04-26 PROCEDURE — 87491 CHLMYD TRACH DNA AMP PROBE: CPT

## 2023-04-26 PROCEDURE — 87591 N.GONORRHOEAE DNA AMP PROB: CPT

## 2023-04-26 PROCEDURE — 99214 OFFICE O/P EST MOD 30 MIN: CPT

## 2023-04-26 RX ORDER — DOXYCYCLINE HYCLATE 100 MG
100 TABLET ORAL 2 TIMES DAILY
Qty: 14 TABLET | Refills: 0 | Status: SHIPPED | OUTPATIENT
Start: 2023-04-26 | End: 2023-05-03

## 2023-04-26 ASSESSMENT — ENCOUNTER SYMPTOMS
CONSTITUTIONAL NEGATIVE: 1
GASTROINTESTINAL NEGATIVE: 1
NEUROLOGICAL NEGATIVE: 1
EYES NEGATIVE: 1
RESPIRATORY NEGATIVE: 1
FLANK PAIN: 0
CARDIOVASCULAR NEGATIVE: 1
MUSCULOSKELETAL NEGATIVE: 1
PSYCHIATRIC NEGATIVE: 1

## 2023-04-26 NOTE — PROGRESS NOTES
Chief Complaint   Patient presents with    [facility] [place] [facility]    Sexually Transmitted Diseases     Tested-discharge- yellow and cramps abdominal - 5 days ago      HISTORY OF THE PRESENT ILLNESS: Patient is a 22 y.o. female. This pleasant patient is here today to discuss:    Patient reports having unprotected sexual encounter with ex-partner 4/15 who admitted to having sexual relations with other individuals. The next day, she developed dysuria, vaginal pain that progressed to yellowish/whitish vaginal discharge. She is having suprapubic and pelvic pain, intermittently. No vaginal bleeding, no foul odor. NO fever.  LMP in January.       Allergies: Pcn [penicillins]    Current Outpatient Medications Ordered in Epic   Medication Sig Dispense Refill    doxycycline (VIBRAMYCIN) 100 MG Tab Take 1 Tablet by mouth 2 times a day for 7 days. 14 Tablet 0    ibuprofen (MOTRIN) 800 MG Tab Take 800 mg by mouth 3 times a day as needed. for pain      clindamycin-benzoyl peroxide (BENZACLIN) gel Apply 1 Application. topically 2 times a day. 35 g 0    oxyCODONE immediate-release (ROXICODONE) 5 MG Tab Take 1 Tablet by mouth every 6 hours as needed. (Patient not taking: Reported on 4/26/2023)      nitrofurantoin (MACROBID) 100 MG Cap TAKE 1 CAPSULE BY MOUTH TWICE A DAY FOR 7 DAYS       No current [facility]-ordered facility-administered medications on file.       Past Medical History:   Diagnosis Date    Depression 6/2/2020       Past Surgical History:   Procedure Laterality Date    LIPOSUCTION      MAMMOPLASTY AUGMENTATION      OTHER      Nose surgery       Social History     Tobacco Use    Smoking status: Never    Smokeless tobacco: Never   Vaping Use    Vaping Use: Never used   Substance Use Topics    Alcohol use: Yes     Comment: social    Drug use: Not Currently     Types: Inhaled, Marijuana     Comment: Marijuana in past 2 years       Social History     Social History Narrative    Not on file       Family History   Problem Relation Age of  "Onset    Breast Cancer Maternal Grandmother     Cancer Maternal Grandmother     Cancer Maternal Grandfather    Review of Systems   Constitutional: Negative.    HENT: Negative.     Eyes: Negative.    Respiratory: Negative.     Cardiovascular: Negative.    Gastrointestinal: Negative.    Genitourinary:  Positive for dysuria. Negative for flank pain, frequency, hematuria and urgency.   Musculoskeletal: Negative.    Skin: Negative.    Neurological: Negative.    Endo/Heme/Allergies: Negative.    Psychiatric/Behavioral: Negative.       Exam: BP 96/68 (BP Location: Left arm, Patient Position: Sitting, BP Cuff Size: Adult)   Pulse 72   Temp 36.6 °C (97.8 °F) (Temporal)   Resp 16   Ht 1.626 m (5' 4\")   Wt 69.8 kg (153 lb 12.8 oz)   SpO2 97%  Body mass index is 26.4 kg/m².    General: Normal appearing. No distress.  HEENT: Normocephalic. Eyes conjunctiva clear lids without ptosis, ears normal shape and contour,nasal mucosa benign, oropharynx is without erythema, edema or exudates.   Neck: Supple. Thyroid is not enlarged.  Pulmonary: Clear to ausculation.  Normal effort. No rales, ronchi, or wheezing.  Cardiovascular: Regular rate and rhythm without murmur. Carotid and radial pulses are intact and equal bilaterally.  Abdomen: Soft, nontender, nondistended. Normal bowel sounds.  Neurologic: Grossly nonfocal  Lymph: No cervical, supraclavicular lymph nodes are palpable  Skin: Warm and dry.  No obvious lesions.  Musculoskeletal: Normal gait. No extremity cyanosis, clubbing, or edema.  Psych: Normal mood and affect. Alert and oriented x3. Judgment and insight is normal.     Please note that this dictation was created using voice recognition software. I have made every reasonable attempt to correct obvious errors, but I expect that there are errors of grammar and possibly content that I did not discover before finalizing the note.      Assessment/Plan    22 y.o. female with the following -    1. Screening examination for " sexually transmitted disease  2. Possible exposure to STD    - Chlamydia/GC, PCR (Genital/Anal swab); Future  - Chlamydia/GC, PCR (Genital/Anal swab)  - HIV AG/AB Combo Assay Screening; Future  - T.Pallidum AB DEYA (Screening); Future  - Hepatitis C Virus Antibody; Future  - HEP B Surface Antibody; Future  - Hep B Core AB Total; Future  - Hep B Surface Antigen; Future  - doxycycline (VIBRAMYCIN) 100 MG Tab; Take 1 Tablet by mouth 2 times a day for 7 days.  Dispense: 14 Tablet; Refill: 0  - cefTRIAXone (Rocephin) 1 g, lidocaine (XYLOCAINE) 1 % 4 mL for IM use    3. Vaginal discharge  Acute condition.  Patient with known possible exposure to STD.  Patient presentation could be STD versus BV.  Will send samples to lab.  We will treat empirically for STD.  Patient agreeable to this.    - POCT PREGNANCY  - POCT Urinalysis  - Chlamydia/GC, PCR (Genital/Anal swab); Future  - VAGINAL PATHOGENS DNA PANEL; Future  - doxycycline (VIBRAMYCIN) 100 MG Tab; Take 1 Tablet by mouth 2 times a day for 7 days.  Dispense: 14 Tablet; Refill: 0  - cefTRIAXone (Rocephin) 1 g, lidocaine (XYLOCAINE) 1 % 4 mL for IM use    4. Dysuria  U/A negative for UTI.    - POCT Urinalysis  - doxycycline (VIBRAMYCIN) 100 MG Tab; Take 1 Tablet by mouth 2 times a day for 7 days.  Dispense: 14 Tablet; Refill: 0  - cefTRIAXone (Rocephin) 1 g, lidocaine (XYLOCAINE) 1 % 4 mL for IM use    5. Missed period  HCG urine negative in office.    - POCT PREGNANCY    6. Suprapubic pain  7. Pelvic pain  Patient with possible exposure to STD.  Patient presentation concerning for possible PID.  Discussed empirically treating with ceftriaxone IM and doxycycline.  Patient agreeable to this.  Med administration and side effects discussed.  Educated on safe sex practices.    - doxycycline (VIBRAMYCIN) 100 MG Tab; Take 1 Tablet by mouth 2 times a day for 7 days.  Dispense: 14 Tablet; Refill: 0  - cefTRIAXone (Rocephin) 1 g, lidocaine (XYLOCAINE) 1 % 4 mL for IM use    Medical  Decision Making/Course:  In the course of preparing for this visit with review of the pertinent past medical history, recent and past clinic visits, current medications, and performing chart, immunization, medical history and medication reconciliation, and in the further course of obtaining the current history pertinent to the clinic visit today, performing an exam and evaluation, ordering and independently evaluating labs, tests, and/or procedures, prescribing any recommended new medications as noted above, providing any pertinent counseling and education and recommending further coordination of care. This was discussed with patient in a shared-decision making conversation, and they understand and agreed with plan of care.     Return if symptoms worsen or fail to improve.    Thank you, Nandini SOUTH  [facility]    Please note that this dictation was created using voice recognition software. I have made every reasonable attempt to correct obvious errors, but I expect that there are errors of grammar and possibly content that I did not discover before finalizing the note.

## 2023-04-28 DIAGNOSIS — B96.89 BACTERIAL VAGINOSIS: ICD-10-CM

## 2023-04-28 DIAGNOSIS — N76.0 BACTERIAL VAGINOSIS: ICD-10-CM

## 2023-04-28 RX ORDER — METRONIDAZOLE 500 MG/1
500 TABLET ORAL 2 TIMES DAILY
Qty: 14 TABLET | Refills: 0 | Status: SHIPPED | OUTPATIENT
Start: 2023-04-28 | End: 2023-04-28 | Stop reason: SDUPTHER

## 2023-04-30 RX ORDER — METRONIDAZOLE 500 MG/1
500 TABLET ORAL 2 TIMES DAILY
Qty: 14 TABLET | Refills: 0 | Status: SHIPPED | OUTPATIENT
Start: 2023-04-30 | End: 2023-05-07

## 2023-06-06 ENCOUNTER — OFFICE VISIT (OUTPATIENT)
Dept: MEDICAL GROUP | Facility: IMAGING CENTER | Age: 23
End: 2023-06-06
Payer: COMMERCIAL

## 2023-06-06 ENCOUNTER — HOSPITAL ENCOUNTER (OUTPATIENT)
Facility: MEDICAL CENTER | Age: 23
End: 2023-06-06
Payer: COMMERCIAL

## 2023-06-06 VITALS
SYSTOLIC BLOOD PRESSURE: 120 MMHG | BODY MASS INDEX: 28.17 KG/M2 | RESPIRATION RATE: 16 BRPM | OXYGEN SATURATION: 96 % | DIASTOLIC BLOOD PRESSURE: 60 MMHG | WEIGHT: 165 LBS | HEIGHT: 64 IN | HEART RATE: 82 BPM | TEMPERATURE: 97 F

## 2023-06-06 DIAGNOSIS — N89.8 VAGINAL PRURITUS: ICD-10-CM

## 2023-06-06 DIAGNOSIS — N89.8 VAGINAL DISCHARGE: ICD-10-CM

## 2023-06-06 DIAGNOSIS — R30.0 DYSURIA: ICD-10-CM

## 2023-06-06 LAB
APPEARANCE UR: NORMAL
BILIRUB UR STRIP-MCNC: NORMAL MG/DL
COLOR UR AUTO: NORMAL
GLUCOSE UR STRIP.AUTO-MCNC: NORMAL MG/DL
KETONES UR STRIP.AUTO-MCNC: NORMAL MG/DL
LEUKOCYTE ESTERASE UR QL STRIP.AUTO: NORMAL
NITRITE UR QL STRIP.AUTO: NORMAL
PH UR STRIP.AUTO: 6 [PH] (ref 5–8)
PROT UR QL STRIP: NORMAL MG/DL
RBC UR QL AUTO: NORMAL
SP GR UR STRIP.AUTO: 1.02
UROBILINOGEN UR STRIP-MCNC: 0.2 MG/DL

## 2023-06-06 PROCEDURE — 87086 URINE CULTURE/COLONY COUNT: CPT

## 2023-06-06 PROCEDURE — 87591 N.GONORRHOEAE DNA AMP PROB: CPT

## 2023-06-06 PROCEDURE — 3074F SYST BP LT 130 MM HG: CPT

## 2023-06-06 PROCEDURE — 87660 TRICHOMONAS VAGIN DIR PROBE: CPT

## 2023-06-06 PROCEDURE — 87510 GARDNER VAG DNA DIR PROBE: CPT

## 2023-06-06 PROCEDURE — 3078F DIAST BP <80 MM HG: CPT

## 2023-06-06 PROCEDURE — 87480 CANDIDA DNA DIR PROBE: CPT

## 2023-06-06 PROCEDURE — 81002 URINALYSIS NONAUTO W/O SCOPE: CPT

## 2023-06-06 PROCEDURE — 87491 CHLMYD TRACH DNA AMP PROBE: CPT

## 2023-06-06 PROCEDURE — 99213 OFFICE O/P EST LOW 20 MIN: CPT

## 2023-06-06 PROCEDURE — 87077 CULTURE AEROBIC IDENTIFY: CPT

## 2023-06-06 RX ORDER — FLUCONAZOLE 150 MG/1
150 TABLET ORAL DAILY
Qty: 1 TABLET | Refills: 3 | Status: SHIPPED | OUTPATIENT
Start: 2023-06-06 | End: 2023-07-14 | Stop reason: SDUPTHER

## 2023-06-06 NOTE — PROGRESS NOTES
Subjective:     CC:   Chief Complaint   Patient presents with    Other     Pt states she has a yeast itchiness and discharge infection, oder    Sexually Transmitted Diseases       HPI:   Caitlyn presents today to discuss:    Vaginal discharge  Vaginal pruritis  Patient reports developing vaginal pruritus with white/yellow clumpy vaginal discharge 2 days ago after unprotected sexual encounter with former significant other.  Associated symptoms include foul smelling odor.  She does have burning sensation with urination.  She has not tried any OTC medication.  No fever, pelvic pain, vaginal lesions or abnormal vaginal bleeding.  Denies urinary urgency, frequency, hematuria, incontinence, flank pain.    Past Medical History:   Diagnosis Date    Depression 6/2/2020     Family History   Problem Relation Age of Onset    Breast Cancer Maternal Grandmother     Cancer Maternal Grandmother     Cancer Maternal Grandfather      Past Surgical History:   Procedure Laterality Date    LIPOSUCTION      MAMMOPLASTY AUGMENTATION      OTHER      Nose surgery     Social History     Tobacco Use    Smoking status: Never    Smokeless tobacco: Never   Vaping Use    Vaping Use: Never used   Substance Use Topics    Alcohol use: Yes     Comment: social    Drug use: Not Currently     Types: Inhaled, Marijuana     Comment: Marijuana in past 2 years     Social History     Social History Narrative    Not on file     Current Outpatient Medications Ordered in Epic   Medication Sig Dispense Refill    fluconazole (DIFLUCAN) 150 MG tablet Take 1 Tablet by mouth every day. 1 Tablet 3    clindamycin-benzoyl peroxide (BENZACLIN) gel Apply 1 Application. topically 2 times a day. 35 g 0    ibuprofen (MOTRIN) 800 MG Tab Take 800 mg by mouth 3 times a day as needed. for pain (Patient not taking: Reported on 6/6/2023)      oxyCODONE immediate-release (ROXICODONE) 5 MG Tab Take 1 Tablet by mouth every 6 hours as needed. (Patient not taking: Reported on  "4/26/2023)      nitrofurantoin (MACROBID) 100 MG Cap TAKE 1 CAPSULE BY MOUTH TWICE A DAY FOR 7 DAYS       No current [facility]-ordered facility-administered medications on file.     Pcn [penicillins]    ROS: see hpi  Gen: no fevers/chills  Pulm: no sob, no cough  CV: no chest pain, no palpitations, no edema  GI: no nausea/vomiting, no diarrhea  Skin: no rash    Objective:   Exam:  /60 (BP Location: Right arm, Patient Position: Sitting, BP Cuff Size: Adult)   Pulse 82   Temp 36.1 °C (97 °F) (Temporal)   Resp 16   Ht 1.626 m (5' 4\")   Wt 74.8 kg (165 lb)   LMP 05/05/2023   SpO2 96%   BMI 28.32 kg/m²    Body mass index is 28.32 kg/m².    Gen: Alert and oriented, No apparent distress.  HEENT: Head atraumatic, normocephalic. Pupils equal and round.  Neck: Neck is supple without lymphadenopathy.   Lungs: Normal effort, CTA bilaterally, no wheezes, rhonchi, or rales  CV: Regular rate and rhythm. No murmurs, rubs, or gallops.  ABD: +BS. Non-tender, non-distended. No rebound, rigidity, or guarding.  Ext: No clubbing, cyanosis, edema.    Assessment & Plan:     22 y.o. female with the following -     1. Vaginal pruritus  2. Vaginal discharge  New condition.  Patient presentation likely Candida vaginitis.  We will treat empirically with fluconazole 1 tab.  Medication administration and side effects discussed.  Will also rule STI , BV, trich.  Specimen sent to lab.  We will treat accordingly based on results.  Recommend to abstain from sexual intercourse until symptoms have fully resolved.  Safe sex practices discussed.  ED symptoms discussed.  - VAGINAL PATHOGENS DNA PANEL; Future  - Chlamydia/GC, PCR (Genital/Anal swab); Future    - fluconazole (DIFLUCAN) 150 MG tablet; Take 1 Tablet by mouth every day.  Dispense: 1 Tablet; Refill: 3    3. Dysuria  New condition.  UA negative for nitrates; however, positive for leukocytes which could be from vaginal etiology.  Will send for urine culture to evaluate.  Recommend to " increase fluid intake.  Start probiotic supplementation.    - POCT Urinalysis  - URINE CULTURE(NEW); Future    Medical Decision Making/Course:  In the course of preparing for this visit with review of the pertinent past medical history, recent and past clinic visits, current medications, and performing chart, immunization, medical history and medication reconciliation, and in the further course of obtaining the current history pertinent to the clinic visit today, performing an exam and evaluation, ordering and independently evaluating labs, tests, and/or procedures, prescribing any recommended new medications as noted above, providing any pertinent counseling and education and recommending further coordination of care. This was discussed with patient in a shared-decision making conversation, and they understand and agreed with plan of care.     Return if symptoms worsen or fail to improve.    MADELAINE Izaguirre.   [facility]    Please note that this dictation was created using voice recognition software. I have made every reasonable attempt to correct obvious errors, but I expect that there are errors of grammar and possibly content that I did not discover before finalizing the note.

## 2023-06-07 LAB
C TRACH DNA GENITAL QL NAA+PROBE: NEGATIVE
CANDIDA DNA VAG QL PROBE+SIG AMP: POSITIVE
G VAGINALIS DNA VAG QL PROBE+SIG AMP: POSITIVE
N GONORRHOEA DNA GENITAL QL NAA+PROBE: NEGATIVE
SPECIMEN SOURCE: NORMAL
T VAGINALIS DNA VAG QL PROBE+SIG AMP: NEGATIVE

## 2023-06-08 DIAGNOSIS — B96.89 BACTERIAL VAGINOSIS: ICD-10-CM

## 2023-06-08 DIAGNOSIS — N76.0 BACTERIAL VAGINOSIS: ICD-10-CM

## 2023-06-08 RX ORDER — METRONIDAZOLE 500 MG/1
500 TABLET ORAL 2 TIMES DAILY
Qty: 14 TABLET | Refills: 0 | Status: SHIPPED | OUTPATIENT
Start: 2023-06-08 | End: 2023-06-15

## 2023-06-09 LAB
BACTERIA UR CULT: ABNORMAL
BACTERIA UR CULT: ABNORMAL
SIGNIFICANT IND 70042: ABNORMAL
SITE SITE: ABNORMAL
SOURCE SOURCE: ABNORMAL

## 2023-06-09 NOTE — PROGRESS NOTES
Patient's vaginal pathogen is positive for Candida and bacterial vaginosis.  Patient was treated with fluconazole for candida during office visit. Will send prescription for metronidazole.  Medication administration and side effects discussed with patient.

## 2023-07-08 DIAGNOSIS — L73.9 FOLLICULITIS: ICD-10-CM

## 2023-07-10 RX ORDER — CLINDAMYCIN AND BENZOYL PEROXIDE 10; 50 MG/G; MG/G
1 GEL TOPICAL 2 TIMES DAILY
Qty: 35 G | Refills: 0 | Status: SHIPPED | OUTPATIENT
Start: 2023-07-10 | End: 2023-08-21 | Stop reason: SDUPTHER

## 2023-07-14 DIAGNOSIS — N89.8 VAGINAL DISCHARGE: ICD-10-CM

## 2023-07-14 DIAGNOSIS — N89.8 VAGINAL PRURITUS: ICD-10-CM

## 2023-07-15 RX ORDER — FLUCONAZOLE 150 MG/1
150 TABLET ORAL DAILY
Qty: 1 TABLET | Refills: 3 | Status: SHIPPED | OUTPATIENT
Start: 2023-07-15 | End: 2023-08-01

## 2023-07-31 ENCOUNTER — OFFICE VISIT (OUTPATIENT)
Dept: MEDICAL GROUP | Facility: IMAGING CENTER | Age: 23
End: 2023-07-31
Payer: COMMERCIAL

## 2023-07-31 VITALS
BODY MASS INDEX: 28.85 KG/M2 | SYSTOLIC BLOOD PRESSURE: 104 MMHG | HEART RATE: 78 BPM | RESPIRATION RATE: 16 BRPM | HEIGHT: 64 IN | OXYGEN SATURATION: 98 % | DIASTOLIC BLOOD PRESSURE: 72 MMHG | WEIGHT: 169 LBS | TEMPERATURE: 98.3 F

## 2023-07-31 DIAGNOSIS — N92.6 MISSED PERIOD: ICD-10-CM

## 2023-07-31 DIAGNOSIS — O21.9 NAUSEA/VOMITING IN PREGNANCY: ICD-10-CM

## 2023-07-31 DIAGNOSIS — O26.90 PREGNANCY-RELATED SYMPTOM, ANTEPARTUM: ICD-10-CM

## 2023-07-31 DIAGNOSIS — Z3A.01 LESS THAN 8 WEEKS GESTATION OF PREGNANCY: ICD-10-CM

## 2023-07-31 DIAGNOSIS — B37.9 YEAST INFECTION: ICD-10-CM

## 2023-07-31 LAB
POCT INT CON NEG: POSITIVE
POCT INT CON POS: POSITIVE
POCT URINE PREGNANCY TEST: POSITIVE

## 2023-07-31 PROCEDURE — 3078F DIAST BP <80 MM HG: CPT

## 2023-07-31 PROCEDURE — 3074F SYST BP LT 130 MM HG: CPT

## 2023-07-31 PROCEDURE — 81025 URINE PREGNANCY TEST: CPT

## 2023-07-31 PROCEDURE — 99214 OFFICE O/P EST MOD 30 MIN: CPT

## 2023-07-31 RX ORDER — DOXYLAMINE SUCCINATE AND PYRIDOXINE HYDROCHLORIDE, DELAYED RELEASE TABLETS 10 MG/10 MG 10; 10 MG/1; MG/1
1 TABLET, DELAYED RELEASE ORAL 3 TIMES DAILY PRN
Qty: 60 TABLET | Refills: 0 | Status: SHIPPED | OUTPATIENT
Start: 2023-07-31 | End: 2023-08-01

## 2023-07-31 NOTE — PROGRESS NOTES
Subjective:     CC:   Chief Complaint   Patient presents with    Other     Pregnancy test 6/10/2023  Nausea -want prescription for it         HPI:   Caitlyn presents today to discuss:    Patient presents today to request pregnancy test. Her LMP was 6/10/2023. She is sexually active. Last unprotected sexual encounter was 3 days ago.  She is having symptoms of nausea for 1 week.   She has had multiple pregnancies in the past with similar symptoms.  Patient requesting referral to OB/GYN.  Patient reports of having yeast infection and OTC Monistat is not effective.  She admits to taking flucanazole yesterday.  Her symptoms are improving.  Past Medical History:   Diagnosis Date    Depression 6/2/2020     Family History   Problem Relation Age of Onset    Breast Cancer Maternal Grandmother     Cancer Maternal Grandmother     Cancer Maternal Grandfather      Past Surgical History:   Procedure Laterality Date    LIPOSUCTION      MAMMOPLASTY AUGMENTATION      OTHER      Nose surgery     Social History     Tobacco Use    Smoking status: Never    Smokeless tobacco: Never   Vaping Use    Vaping Use: Never used   Substance Use Topics    Alcohol use: Yes     Comment: social    Drug use: Not Currently     Types: Inhaled, Marijuana     Comment: Marijuana in past 2 years     Social History     Social History Narrative    Not on file     Current Outpatient Medications Ordered in Epic   Medication Sig Dispense Refill    Doxylamine-Pyridoxine 10-10 MG Tablet Delayed Response delayed-release tablet Take 1 Tablet by mouth 3 times a day as needed (nausea). 60 Tablet 0    fluconazole (DIFLUCAN) 150 MG tablet Take 1 Tablet by mouth every day. 1 Tablet 3    clindamycin-benzoyl peroxide (BENZACLIN) gel Apply 1 Application  topically 2 times a day. 35 g 0    ibuprofen (MOTRIN) 800 MG Tab Take 800 mg by mouth 3 times a day as needed. for pain (Patient not taking: Reported on 6/6/2023)      oxyCODONE immediate-release (ROXICODONE) 5 MG Tab Take  "1 Tablet by mouth every 6 hours as needed. (Patient not taking: Reported on 4/26/2023)      nitrofurantoin (MACROBID) 100 MG Cap TAKE 1 CAPSULE BY MOUTH TWICE A DAY FOR 7 DAYS       No current [facility]-ordered facility-administered medications on file.     Pcn [penicillins]    ROS: see hpi  Gen: no fevers/chills  Pulm: no sob, no cough  CV: no chest pain, no palpitations, no edema  GI: no nausea/vomiting, no diarrhea  Skin: no rash    Objective:   Exam:  /72 (BP Location: Left arm, Patient Position: Sitting, BP Cuff Size: Adult)   Pulse 78   Temp 36.8 °C (98.3 °F) (Temporal)   Resp 16   Ht 1.626 m (5' 4\")   Wt 76.7 kg (169 lb)   LMP 06/10/2023   SpO2 98%   BMI 29.01 kg/m²    Body mass index is 29.01 kg/m².    Gen: Alert and oriented, No apparent distress.  HEENT: Head atraumatic, normocephalic. Pupils equal and round.  Neck: Neck is supple without lymphadenopathy.   Lungs: Normal effort, CTA bilaterally, no wheezes, rhonchi, or rales  CV: Regular rate and rhythm. No murmurs, rubs, or gallops.  ABD: +BS. Non-tender, non-distended. No rebound, rigidity, or guarding.  Ext: No clubbing, cyanosis, edema.    Assessment & Plan:     22 y.o. female with the following -     1. Missed period  2. Pregnancy-related symptom, antepartum  4. Less than 8 weeks gestation of pregnancy  New finding. POCT hcg urine test positive. Will order hCG quantitative to confirm. /will place referral to OB/GYN.    - POCT Pregnancy  - HCG QUANTITATIVE; Future  - Referral to OB/Gyn    3. Nausea/vomiting in pregnancy  New issue r/t to pregnancy. Recommend conservative measures such as dietary and lifestyle modifications.  Recommend to increase fluid intake.  May use ginger to help alleviate nausea and vomiting.    Advised patient not to skip meals and avoid an empty stomach. Eat small meals and frequent snacks.  Avoid known food triggers.  Recommend Doxylamine-Pyridoxine PRN.     - Doxylamine-Pyridoxine 10-10 MG Tablet Delayed Response " delayed-release tablet; Take 1 Tablet by mouth 3 times a day as needed (nausea).  Dispense: 60 Tablet; Refill: 0      5. Yeast infection  Established and resolving condition.  Recommend to increase fluid intake and start probiotics.  Do not use fluconazole during pregnancy.  May use OTC Monistat.    Medical Decision Making/Course:  In the course of preparing for this visit with review of the pertinent past medical history, recent and past clinic visits, current medications, and performing chart, immunization, medical history and medication reconciliation, and in the further course of obtaining the current history pertinent to the clinic visit today, performing an exam and evaluation, ordering and independently evaluating labs, tests, and/or procedures, prescribing any recommended new medications as noted above, providing any pertinent counseling and education and recommending further coordination of care. This was discussed with patient in a shared-decision making conversation, and they understand and agreed with plan of care.     Return if symptoms worsen or fail to improve.    MADELAINE Izaguirre.   [facility]    Please note that this dictation was created using voice recognition software. I have made every reasonable attempt to correct obvious errors, but I expect that there are errors of grammar and possibly content that I did not discover before finalizing the note.

## 2023-08-15 ENCOUNTER — TELEMEDICINE (OUTPATIENT)
Dept: MEDICAL GROUP | Facility: IMAGING CENTER | Age: 23
End: 2023-08-15
Payer: COMMERCIAL

## 2023-08-15 VITALS — WEIGHT: 170 LBS | BODY MASS INDEX: 29.02 KG/M2 | HEIGHT: 64 IN

## 2023-08-15 DIAGNOSIS — O21.9 NAUSEA AND VOMITING IN PREGNANCY: ICD-10-CM

## 2023-08-15 DIAGNOSIS — Z02.89 ENCOUNTER TO OBTAIN EXCUSE FROM WORK: ICD-10-CM

## 2023-08-15 PROCEDURE — 99213 OFFICE O/P EST LOW 20 MIN: CPT | Mod: 95

## 2023-08-15 ASSESSMENT — PATIENT HEALTH QUESTIONNAIRE - PHQ9
2. FEELING DOWN, DEPRESSED, IRRITABLE, OR HOPELESS: NOT AT ALL
6. FEELING BAD ABOUT YOURSELF - OR THAT YOU ARE A FAILURE OR HAVE LET YOURSELF OR YOUR FAMILY DOWN: NOT AL ALL
1. LITTLE INTEREST OR PLEASURE IN DOING THINGS: NOT AT ALL
SUM OF ALL RESPONSES TO PHQ QUESTIONS 1-9: 0
3. TROUBLE FALLING OR STAYING ASLEEP OR SLEEPING TOO MUCH: NOT AT ALL
5. POOR APPETITE OR OVEREATING: NOT AT ALL
SUM OF ALL RESPONSES TO PHQ9 QUESTIONS 1 AND 2: 0
8. MOVING OR SPEAKING SO SLOWLY THAT OTHER PEOPLE COULD HAVE NOTICED. OR THE OPPOSITE, BEING SO FIGETY OR RESTLESS THAT YOU HAVE BEEN MOVING AROUND A LOT MORE THAN USUAL: NOT AT ALL
9. THOUGHTS THAT YOU WOULD BE BETTER OFF DEAD, OR OF HURTING YOURSELF: NOT AT ALL
7. TROUBLE CONCENTRATING ON THINGS, SUCH AS READING THE NEWSPAPER OR WATCHING TELEVISION: NOT AT ALL
4. FEELING TIRED OR HAVING LITTLE ENERGY: NOT AT ALL

## 2023-08-15 ASSESSMENT — PAIN SCALES - GENERAL: PAINLEVEL: NO PAIN

## 2023-08-15 NOTE — LETTER
[facility]  [facility]  [address] [place] [address]     August 15, 2023    Patient: Caitlyn Diamond   YOB: 2000   Date of Visit: 8/15/2023       To Whom It May Concern:    Caitlyn Diamond was seen and treated in our department on 8/15/2023. Please excuse Caitlyn from school on 8/14 due to her symptoms. Caitlyn's symptoms may remain persistent during the duration of her pregnancy. Please excuse her from school if this occurs.     Sincerely,   SAMPSON Izaguirre   Electronically signed 8/15 9:25 am

## 2023-08-15 NOTE — PROGRESS NOTES
Virtual Visit: Established Patient   This visit was conducted via Zoom using secure and encrypted videoconferencing technology.   The patient was in their home in the [facility].    The patient's identity was confirmed and verbal consent was obtained for this virtual visit.    Subjective:   CC:   Chief Complaint   Patient presents with    Letter for School/Work     Caitlyn Diamond is a 22 y.o. female presenting for evaluation and management of:    Pregnancy  Nausea and vomiting of pregnancy  Patient continues to have nausea and vomiting related to her pregnancy. Her symptoms caused her to miss school and is requesting excuse note.  She has not been able to establish with ob/gyn.   She is able to tolerate PO intake.  Denies dizziness, palpitations, syncope.    ROS per hpi  Gen: no fevers/chills  Pulm: no sob, no cough  CV: no chest pain, no palpitations, no edema  GI: no nausea/vomiting, no diarrhea  Skin: no rash        Current medicines (including changes today)  Current Outpatient Medications   Medication Sig Dispense Refill    Doxylamine-Pyridoxine 10-10 MG Tablet Delayed Response delayed-release tablet Take 1 Tablet by mouth 3 times a day as needed (nausea). 60 Tablet 0    clindamycin-benzoyl peroxide (BENZACLIN) gel Apply 1 Application  topically 2 times a day. 35 g 0    fluconazole (DIFLUCAN) 150 MG tablet Take 1 Tablet by mouth every day. (Patient not taking: Reported on 8/15/2023) 1 Tablet 3    ibuprofen (MOTRIN) 800 MG Tab Take 800 mg by mouth 3 times a day as needed. for pain (Patient not taking: Reported on 6/6/2023)      oxyCODONE immediate-release (ROXICODONE) 5 MG Tab Take 1 Tablet by mouth every 6 hours as needed. (Patient not taking: Reported on 4/26/2023)      nitrofurantoin (MACROBID) 100 MG Cap TAKE 1 CAPSULE BY MOUTH TWICE A DAY FOR 7 DAYS       No current facility-administered medications for this visit.       Patient Active Problem List    Diagnosis Date Noted    Less than 8 weeks  "gestation of pregnancy 07/31/2023    History of incarceration 06/02/2020    Dysthymic disorder 04/23/2015        Objective:   Ht 1.626 m (5' 4\")   Wt 77.1 kg (170 lb)   LMP 06/10/2023   Breastfeeding No   BMI 29.18 kg/m²     Physical Exam:  Constitutional: Alert, no distress, well-groomed.  Skin: No rashes in visible areas.  Eye: Round. Conjunctiva clear, lids normal. No icterus.   ENMT: Lips pink without lesions, good dentition, moist mucous membranes. Phonation normal.  Neck: No masses, no thyromegaly. Moves freely without pain.  Respiratory: Unlabored respiratory effort, no cough or audible wheeze  Psych: Alert and oriented x3, normal affect and mood.     Assessment and Plan:   The following treatment plan was discussed:     1. Nausea and vomiting in pregnancy  Patient newly diagnosed with pregnancy.  Patient continues to have nausea and vomiting related to pregnancy.  Recommend conservative therapy such as dietary changes such as eating before she feels hungry to avoid an empty stomach, snacking throughout the day, high-protein food, eat slowly, oral hygiene, eliminating coffee, spicy, acidic, sugary food, take ganesh-containing foods.  Continue with OTC doxylamine-pyridoxine PRN.  Increase fluid intake to avoid dehydration.  Recommend to schedule an appointment with OB/GYN.  Information provided to patient.  ED symptoms discussed.    2. Encounter to obtain excuse from work  Excuse note for school provided to patient.    Medical Decision Making/Course:  In the course of preparing for this visit with review of the pertinent past medical history, recent and past clinic visits, current medications, and performing chart, immunization, medical history and medication reconciliation, and in the further course of obtaining the current history pertinent to the clinic visit today, performing an exam and evaluation, ordering and independently evaluating labs, tests, and/or procedures, prescribing any recommended new " medications as noted above, providing any pertinent counseling and education and recommending further coordination of care. This was discussed with patient in a shared-decision making conversation, and they understand and agreed with plan of care.     Follow-up: Return if symptoms worsen or fail to improve.    Thank you, Nanidni SOUTH  [facility]

## 2023-08-21 DIAGNOSIS — L73.9 FOLLICULITIS: ICD-10-CM

## 2023-08-22 RX ORDER — CLINDAMYCIN AND BENZOYL PEROXIDE 10; 50 MG/G; MG/G
1 GEL TOPICAL 2 TIMES DAILY
Qty: 35 G | Refills: 0 | Status: SHIPPED | OUTPATIENT
Start: 2023-08-22 | End: 2023-10-03 | Stop reason: SDUPTHER

## 2023-09-06 ENCOUNTER — OFFICE VISIT (OUTPATIENT)
Dept: URGENT CARE | Facility: PHYSICIAN GROUP | Age: 23
End: 2023-09-06
Payer: COMMERCIAL

## 2023-09-06 VITALS
RESPIRATION RATE: 18 BRPM | HEART RATE: 71 BPM | WEIGHT: 177 LBS | TEMPERATURE: 97.5 F | OXYGEN SATURATION: 98 % | BODY MASS INDEX: 30.22 KG/M2 | HEIGHT: 64 IN | SYSTOLIC BLOOD PRESSURE: 122 MMHG | DIASTOLIC BLOOD PRESSURE: 64 MMHG

## 2023-09-06 DIAGNOSIS — Z11.3 ENCOUNTER FOR SCREENING EXAMINATION FOR SEXUALLY TRANSMITTED DISEASE: ICD-10-CM

## 2023-09-06 DIAGNOSIS — O20.9 VAGINAL BLEEDING AFFECTING EARLY PREGNANCY: ICD-10-CM

## 2023-09-06 PROCEDURE — 99214 OFFICE O/P EST MOD 30 MIN: CPT

## 2023-09-06 PROCEDURE — 3078F DIAST BP <80 MM HG: CPT

## 2023-09-06 PROCEDURE — 3074F SYST BP LT 130 MM HG: CPT

## 2023-09-06 NOTE — PROGRESS NOTES
"Chief Complaint   Patient presents with    Exposure to STD     Std testing, and has been bleeding in the past couple days, pt is pregnant         Subjective:   HISTORY OF PRESENT ILLNESS: Caitlyn Diamond is a 22 y.o. female who presents for STD testing and vaginal bleeding.  She reports that she has had mild spotting the last few days, she is ~ 12 weeks pregnant, has not had any US up to this point in this pregnancy.  She reports having sex last night with a new partner and the condom broke is is concerned about HIV and other STI's, she is currently having no vaginal discharge or noticeable vaginal lesions   Denies fevers.  She has mild lower abd pain      Medications, Allergies, current problem list, Social and Family history reviewed today in Epic.     Objective:     /64   Pulse 71   Temp 36.4 °C (97.5 °F) (Temporal)   Resp 18   Ht 1.626 m (5' 4\")   Wt 80.3 kg (177 lb)   SpO2 98%     Physical Exam  Vitals reviewed.   Constitutional:       Appearance: Normal appearance.   HENT:      Mouth/Throat:      Mouth: Mucous membranes are moist.   Cardiovascular:      Rate and Rhythm: Normal rate.   Pulmonary:      Effort: Pulmonary effort is normal.   Abdominal:      Tenderness: There is abdominal tenderness in the right lower quadrant and left lower quadrant. There is no guarding or rebound.      Comments: Mild bilateral lower abd pain   Skin:     General: Skin is warm and dry.   Neurological:      Mental Status: She is alert and oriented to person, place, and time.   Psychiatric:         Mood and Affect: Mood normal.          Assessment/Plan:     Diagnosis and associated orders    I personally reviewed prior external notes and test results pertinent to today's visit.     1. Vaginal bleeding affecting early pregnancy        2. Encounter for screening examination for sexually transmitted disease                  IMPRESSION: Patient is non-toxic appearing and suitable for outpatient care at this time.  " Symptoms and exam are concerning for ectopic preg vs miscarriage .   Exam findings reassuring with stable vital signs, I do not feel she has a ruptured ectopic at this time that warrants 911 call. .  Differential diagnosis discussed. I have advised her to go to the ED now for further evaluation.       Educated on red flag symptoms and Instructed patient to return to Urgent Care or nearest Emergency Department if symptoms fail to improve, for any change in condition, further concerns, or new concerning symptoms. Patient states understanding of the plan of care and discharge instructions.  They are discharged in stable condition.         Please note that this dictation was created using voice recognition software. I have made a reasonable attempt to correct obvious errors, but I expect that there are errors of grammar and possibly content that I did not discover before finalizing the note.    This note was electronically signed by SAMPSON Huang

## 2023-09-08 ENCOUNTER — HOSPITAL ENCOUNTER (OUTPATIENT)
Facility: MEDICAL CENTER | Age: 23
End: 2023-09-08
Attending: NURSE PRACTITIONER
Payer: COMMERCIAL

## 2023-09-08 ENCOUNTER — OFFICE VISIT (OUTPATIENT)
Dept: URGENT CARE | Facility: PHYSICIAN GROUP | Age: 23
End: 2023-09-08
Payer: COMMERCIAL

## 2023-09-08 VITALS
BODY MASS INDEX: 30.22 KG/M2 | TEMPERATURE: 98.5 F | HEIGHT: 64 IN | OXYGEN SATURATION: 98 % | SYSTOLIC BLOOD PRESSURE: 122 MMHG | RESPIRATION RATE: 18 BRPM | WEIGHT: 177 LBS | HEART RATE: 100 BPM | DIASTOLIC BLOOD PRESSURE: 82 MMHG

## 2023-09-08 DIAGNOSIS — N89.8 VAGINAL ODOR: ICD-10-CM

## 2023-09-08 DIAGNOSIS — N89.8 VAGINAL DISCHARGE: ICD-10-CM

## 2023-09-08 PROCEDURE — 3074F SYST BP LT 130 MM HG: CPT | Performed by: NURSE PRACTITIONER

## 2023-09-08 PROCEDURE — 87480 CANDIDA DNA DIR PROBE: CPT

## 2023-09-08 PROCEDURE — 87510 GARDNER VAG DNA DIR PROBE: CPT

## 2023-09-08 PROCEDURE — 99214 OFFICE O/P EST MOD 30 MIN: CPT | Performed by: NURSE PRACTITIONER

## 2023-09-08 PROCEDURE — 3079F DIAST BP 80-89 MM HG: CPT | Performed by: NURSE PRACTITIONER

## 2023-09-08 PROCEDURE — 87660 TRICHOMONAS VAGIN DIR PROBE: CPT

## 2023-09-08 NOTE — PROGRESS NOTES
Subjective     Caitlyn Diamond is a 22 y.o. female who presents with Rash (On right arm ) and Exposure to STD (Wants to get blood tests for std's)            Rash    Exposure to STD  Associated symptoms include a rash.    has been experiencing rash on both arms as well as vaginal odor. Patient has been seen in urgent care 2 days ago for vaginal bleeding and STD check. She states she is  12 weeks pregnant.  was advised to be seen in non[facility] emergency room 2 days ago after urgent care visit. She states she was diagnosed with placenta previa was tested for Gonorrhea and Chlamydia which were negative. Requesting HIV, Syphilis testing at this time. Denies vaginal discharge, lesions or genital rash, dysuria, hematuria. States she believes her rash is associated with HIV. States since she had sexual intercourse with new partner and condom broke she has concern and was not tested at the emergency room for this through blood work. She states she has her first OB appt on 8/12/2023.    PMH:  has a past medical history of Depression (6/2/2020).    She has no past medical history of Addisons disease ([facility]), Adrenal disorder (HCC), Allergy, Anemia, Anxiety, Arrhythmia, Arthritis, Asthma, Blood transfusion without reported diagnosis, Cancer ([facility]), Cataract, CHF (congestive heart failure) ([facility]), Clotting disorder (HCC), COPD (chronic obstructive pulmonary disease) (HCC), Cushings syndrome (HCC), Diabetes (HCC), Diabetic neuropathy (HCC), GERD (gastroesophageal reflux disease), Glaucoma, Goiter, Head ache, Heart attack ([facility]), Heart murmur, HIV (human immunodeficiency virus infection) ([facility]), Hyperlipidemia, Hypertension, IBD (inflammatory bowel disease), Kidney disease, Meningitis, Migraine, Muscle disorder, Osteoporosis, Parathyroid disorder (HCC), Pituitary disease (HCC), Pulmonary emphysema (HCC), Seizure (HCC), Sickle cell disease (HCC), Stroke (HCC), Substance abuse (HCC), Thyroid disease, Tuberculosis, or  "Urinary tract infection.  MEDS:   Current Outpatient Medications:     clindamycin-benzoyl peroxide (BENZACLIN) gel, Apply 1 Application  topically 2 times a day., Disp: 35 g, Rfl: 0  ALLERGIES:   Allergies   Allergen Reactions    Pcn [Penicillins] Rash     Rash     SURGHX:   Past Surgical History:   Procedure Laterality Date    LIPOSUCTION      MAMMOPLASTY AUGMENTATION      OTHER      Nose surgery     SOCHX:  reports that she has never smoked. She has never used smokeless tobacco. She reports current alcohol use. She reports that she does not currently use drugs after having used the following drugs: Inhaled and Marijuana.  FH: Family history was reviewed, no pertinent findings to report      Review of Systems   Skin:  Positive for rash. Negative for itching.   All other systems reviewed and are negative.             Objective     /82   Pulse 100   Temp 36.9 °C (98.5 °F) (Temporal)   Resp 18   Ht 1.626 m (5' 4\")   Wt 80.3 kg (177 lb)   LMP 06/10/2023   SpO2 98%   BMI 30.38 kg/m²      Physical Exam  Vitals reviewed.   Constitutional:       General: She is awake. She is not in acute distress.     Appearance: Normal appearance. She is not ill-appearing, toxic-appearing or diaphoretic.   Cardiovascular:      Rate and Rhythm: Normal rate.   Pulmonary:      Effort: Pulmonary effort is normal.   Skin:     General: Skin is warm and dry.      Findings: Rash present. Rash is macular. Rash is not crusting, nodular, papular, purpuric, pustular, scaling, urticarial or vesicular.      Comments: A few diffuse mildly erythematous macular lesions on right and left upper extremities. No other areas of body have rash including neck/head.   Neurological:      Mental Status: She is alert and oriented to person, place, and time.   Psychiatric:         Attention and Perception: Attention normal.         Mood and Affect: Mood is anxious.         Speech: Speech normal.         Behavior: Behavior normal.         Judgment: " Judgment is impulsive.                             Assessment & Plan        1. Vaginal odor    - VAGINAL PATHOGENS DNA PANEL; Future  - HIV AG/AB COMBO ASSAY SCREENING; Future  - T.PALLIDUM AB DEYA (SCREENING); Future    2. Vaginal discharge    - VAGINAL PATHOGENS DNA PANEL; Future  - HIV AG/AB COMBO ASSAY SCREENING; Future  - T.PALLIDUM AB DEYA (SCREENING); Future     -Discussed that rash does not appear to be red/purple or itchy in nature, will order HIV and Syphilis due to possible exposure to STD with condom break  -Discussed may be ordered by her new OB with any other testing needed since she will be seen in 4 days, patient insistent on testing now   -Follow up with OB, also gave info about [facility] for regular testing/treatments, if needed    MyChart release of testing ordered, patient notified     -Education provided: see above    -Return to urgent care as needed if new or worsening symptoms  -Patient expresses understanding to treatment and plan of care  -Questions were encouraged and answered  -Advised to follow-up with the primary care provider/OB for recheck, reevaluation, and consideration of further management as needed     -Time spent evaluating this patient was at least 30 minutes. This time comprises of the following: preparing for visit/chart review, patient history taken into account pertinent to symptoms, patient counseling/education for needed treatment outpatient, exam/evaluation/treatment plan provided, ordering any appropriate lab/test/procedures/meds, independent interpretation of any clinic testing, medication management with any other listed medications and chart documentation.

## 2023-09-09 DIAGNOSIS — N89.8 VAGINAL ODOR: ICD-10-CM

## 2023-09-09 DIAGNOSIS — N89.8 VAGINAL DISCHARGE: ICD-10-CM

## 2023-09-09 LAB
CANDIDA DNA VAG QL PROBE+SIG AMP: NEGATIVE
G VAGINALIS DNA VAG QL PROBE+SIG AMP: NEGATIVE
T VAGINALIS DNA VAG QL PROBE+SIG AMP: NEGATIVE

## 2023-09-12 ENCOUNTER — INITIAL PRENATAL (OUTPATIENT)
Dept: OBGYN | Facility: CLINIC | Age: 23
End: 2023-09-12
Payer: COMMERCIAL

## 2023-09-12 VITALS — SYSTOLIC BLOOD PRESSURE: 125 MMHG | BODY MASS INDEX: 30.04 KG/M2 | DIASTOLIC BLOOD PRESSURE: 99 MMHG | WEIGHT: 175 LBS

## 2023-09-12 DIAGNOSIS — N93.8 DUB (DYSFUNCTIONAL UTERINE BLEEDING): ICD-10-CM

## 2023-09-12 PROCEDURE — 3074F SYST BP LT 130 MM HG: CPT | Performed by: OBSTETRICS & GYNECOLOGY

## 2023-09-12 PROCEDURE — 76830 TRANSVAGINAL US NON-OB: CPT | Performed by: OBSTETRICS & GYNECOLOGY

## 2023-09-12 PROCEDURE — 3080F DIAST BP >= 90 MM HG: CPT | Performed by: OBSTETRICS & GYNECOLOGY

## 2023-09-12 PROCEDURE — 99203 OFFICE O/P NEW LOW 30 MIN: CPT | Mod: 25 | Performed by: OBSTETRICS & GYNECOLOGY

## 2023-09-12 ASSESSMENT — EDINBURGH POSTNATAL DEPRESSION SCALE (EPDS)
I HAVE FELT SCARED OR PANICKY FOR NO GOOD REASON: NO, NOT AT ALL
TOTAL SCORE: 0
THINGS HAVE BEEN GETTING ON TOP OF ME: NO, I HAVE BEEN COPING AS WELL AS EVER
I HAVE BLAMED MYSELF UNNECESSARILY WHEN THINGS WENT WRONG: NO, NEVER
I HAVE LOOKED FORWARD WITH ENJOYMENT TO THINGS: AS MUCH AS I EVER DID
I HAVE BEEN ABLE TO LAUGH AND SEE THE FUNNY SIDE OF THINGS: AS MUCH AS I ALWAYS COULD
I HAVE FELT SAD OR MISERABLE: NO, NOT AT ALL
I HAVE BEEN SO UNHAPPY THAT I HAVE HAD DIFFICULTY SLEEPING: NOT AT ALL
I HAVE BEEN SO UNHAPPY THAT I HAVE BEEN CRYING: NO, NEVER
THE THOUGHT OF HARMING MYSELF HAS OCCURRED TO ME: NEVER
I HAVE BEEN ANXIOUS OR WORRIED FOR NO GOOD REASON: NO, NOT AT ALL

## 2023-09-12 NOTE — PROGRESS NOTES
Chief complaint;  This patient is a 22 y.o. female , Patient's last menstrual period was 06/10/2023. presents complaining of dysfunctional uterine bleeding.  Patient did have some spotting and went into the emergency room at [facility] no records are available    Review of systems-denies; chest pain, shortness of breath, fever, chills, abdominal pain  Past OB history-  OB History    Para Term  AB Living   8 2 2   4 2   SAB IAB Ectopic Molar Multiple Live Births   1 3       2      # Outcome Date GA Lbr Ranjeet/2nd Weight Sex Delivery Anes PTL Lv   8 Current            7 Term 20 38w3d 01:50 / 00:33 7 lb 15.7 oz M Vag-Spont EPI  GWENDOLYN   6 IAB            5 IAB            4 Term 17 40w0d   M Induction EPI  GWENDOLYN   3             2 SAB            1 IAB              Past surgical history-   Past Surgical History:   Procedure Laterality Date    LIPOSUCTION      MAMMOPLASTY AUGMENTATION      OTHER      Nose surgery     Past medical history-   Past Medical History:   Diagnosis Date    Depression 2020     Allergies- Pcn [penicillins]  Present medications-   Outpatient Encounter Medications as of 2023   Medication Sig Dispense Refill    Prenatal Vit-Fe Fumarate-FA (PRENATAL 1+1 PO) Take  by mouth.      clindamycin-benzoyl peroxide (BENZACLIN) gel Apply 1 Application  topically 2 times a day. 35 g 0     No facility-administered encounter medications on file as of 2023.     Family history- no history of breast or ovarian cancer  Social history-   Social History     Socioeconomic History    Marital status: Single     Spouse name: Not on file    Number of children: Not on file    Years of education: Not on file    Highest education level: Not on file   Occupational History    Not on file   Tobacco Use    Smoking status: Never    Smokeless tobacco: Never   Vaping Use    Vaping Use: Never used   Substance and Sexual Activity    Alcohol use: Yes     Comment:  social    Drug use: Not Currently     Types: Inhaled, Marijuana     Comment: Marijuana in past 2 years    Sexual activity: Yes     Partners: Male     Comment: Planned pregnancy, not  but baby is welcomed. FOB  is iinvolved and  supportive.   Other Topics Concern    Not on file   Social History Narrative    Not on file     Social Determinants of Health     Financial Resource Strain: Not on file   Food Insecurity: Unknown (8/11/2020)    Hunger Vital Sign     Worried About Running Out of Food in the Last Year: Patient refused     Ran Out of Food in the Last Year: Patient refused   Transportation Needs: Unknown (8/11/2020)    PRAPARE - Transportation     Lack of Transportation (Medical): Patient refused     Lack of Transportation (Non-Medical): Patient refused   Physical Activity: Not on file   Stress: Not on file   Social Connections: Not on file   Intimate Partner Violence: Not on file   Housing Stability: Not on file         Physical examination;   Alert and oriented x3  General-well-developed well-nourished female in no apparent distress  Vitals:    09/12/23 1116   BP: (!) 125/99   Weight: 175 lb     Skin is warm and dry   HEENT-normocephalic,nontraumatic,PERRLA,EOMI  Throat- no edema or erythema  Neck-supple  Cardiovascular-regular rate and rhythm, normal S1-S2 without murmurs or gallops  Lungs-clear to auscultation bilaterally  Back-negative CVA tenderness  Abdomen-nondistended positive bowel sounds soft nontender without masses or hepatosplenomegaly  Pelvic examination;  External genitalia-without lesions  Vagina-no blood, no discharge  Cervix-closed,no gross lesions  Uterus-  13 weeks size, nontender  Adnexa- without mass or tenderness  Extremities-without cyanosis clubbing or edema  Neurologic-grossly intact    Transvaginal ultrasound; as performed and read by myself; intrauterine gestational sac containing tripp pregnancy crown-rump length consistent with 13 weeks 4 days, EDC 3/15/2024 consistent  with LMP EDC no free pelvic fluid no adnexal masses positive cardiac and fetal motion    Impression;  Dysfunctional uterine bleeding-viable gestation confirmed    Plan;   Patient request female provideronly-I discussed with the patient that this group has male and female providers and when she delivers there may only be a male provider on-call-she states this is not acceptable  Needs initial OB      35 minutes total spent with the patient in face-to-face contact,5 separate minutes of total time utilized to perform  Ultrasound, greater than 50% of the time has been spent on counseling and coordination of care.  Early DU B counseling performed-all questions answered in detail

## 2023-09-15 ENCOUNTER — APPOINTMENT (OUTPATIENT)
Dept: MEDICAL GROUP | Facility: IMAGING CENTER | Age: 23
End: 2023-09-15
Payer: COMMERCIAL

## 2023-09-19 ENCOUNTER — HOSPITAL ENCOUNTER (OUTPATIENT)
Facility: MEDICAL CENTER | Age: 23
End: 2023-09-19
Attending: OBSTETRICS & GYNECOLOGY
Payer: COMMERCIAL

## 2023-09-19 LAB
ABO GROUP BLD: NORMAL
APPEARANCE UR: CLEAR
BACTERIA #/AREA URNS HPF: ABNORMAL /HPF
BILIRUB UR QL STRIP.AUTO: NEGATIVE
BLD GP AB SCN SERPL QL: NORMAL
COLOR UR: YELLOW
EPI CELLS #/AREA URNS HPF: ABNORMAL /HPF
GLUCOSE UR STRIP.AUTO-MCNC: NEGATIVE MG/DL
HYALINE CASTS #/AREA URNS LPF: ABNORMAL /LPF
KETONES UR STRIP.AUTO-MCNC: NEGATIVE MG/DL
LEUKOCYTE ESTERASE UR QL STRIP.AUTO: ABNORMAL
MICRO URNS: ABNORMAL
NITRITE UR QL STRIP.AUTO: NEGATIVE
PH UR STRIP.AUTO: 6.5 [PH] (ref 5–8)
PROT UR QL STRIP: NEGATIVE MG/DL
RBC # URNS HPF: ABNORMAL /HPF
RBC UR QL AUTO: NEGATIVE
RH BLD: NORMAL
SP GR UR STRIP.AUTO: 1.02
UROBILINOGEN UR STRIP.AUTO-MCNC: 0.2 MG/DL
WBC #/AREA URNS HPF: ABNORMAL /HPF

## 2023-09-19 PROCEDURE — 86762 RUBELLA ANTIBODY: CPT

## 2023-09-19 PROCEDURE — 86803 HEPATITIS C AB TEST: CPT

## 2023-09-19 PROCEDURE — 86900 BLOOD TYPING SEROLOGIC ABO: CPT

## 2023-09-19 PROCEDURE — 87340 HEPATITIS B SURFACE AG IA: CPT

## 2023-09-19 PROCEDURE — 86901 BLOOD TYPING SEROLOGIC RH(D): CPT

## 2023-09-19 PROCEDURE — 86592 SYPHILIS TEST NON-TREP QUAL: CPT

## 2023-09-19 PROCEDURE — 81001 URINALYSIS AUTO W/SCOPE: CPT

## 2023-09-19 PROCEDURE — 87086 URINE CULTURE/COLONY COUNT: CPT

## 2023-09-19 PROCEDURE — 87591 N.GONORRHOEAE DNA AMP PROB: CPT

## 2023-09-19 PROCEDURE — 86780 TREPONEMA PALLIDUM: CPT

## 2023-09-19 PROCEDURE — 85025 COMPLETE CBC W/AUTO DIFF WBC: CPT

## 2023-09-19 PROCEDURE — 87491 CHLMYD TRACH DNA AMP PROBE: CPT

## 2023-09-19 PROCEDURE — 87389 HIV-1 AG W/HIV-1&-2 AB AG IA: CPT

## 2023-09-19 PROCEDURE — 86850 RBC ANTIBODY SCREEN: CPT

## 2023-09-20 LAB
BASOPHILS # BLD AUTO: 0.5 % (ref 0–1.8)
BASOPHILS # BLD: 0.04 K/UL (ref 0–0.12)
C TRACH DNA SPEC QL NAA+PROBE: NEGATIVE
EOSINOPHIL # BLD AUTO: 0.11 K/UL (ref 0–0.51)
EOSINOPHIL NFR BLD: 1.4 % (ref 0–6.9)
ERYTHROCYTE [DISTWIDTH] IN BLOOD BY AUTOMATED COUNT: 42.9 FL (ref 35.9–50)
HBV SURFACE AG SER QL: ABNORMAL
HCT VFR BLD AUTO: 40.6 % (ref 37–47)
HCV AB SER QL: NORMAL
HGB BLD-MCNC: 13.4 G/DL (ref 12–16)
HIV 1+2 AB+HIV1 P24 AG SERPL QL IA: NORMAL
IMM GRANULOCYTES # BLD AUTO: 0.02 K/UL (ref 0–0.11)
IMM GRANULOCYTES NFR BLD AUTO: 0.3 % (ref 0–0.9)
LYMPHOCYTES # BLD AUTO: 1.3 K/UL (ref 1–4.8)
LYMPHOCYTES NFR BLD: 16.4 % (ref 22–41)
MCH RBC QN AUTO: 29.6 PG (ref 27–33)
MCHC RBC AUTO-ENTMCNC: 33 G/DL (ref 32.2–35.5)
MCV RBC AUTO: 89.6 FL (ref 81.4–97.8)
MONOCYTES # BLD AUTO: 0.5 K/UL (ref 0–0.85)
MONOCYTES NFR BLD AUTO: 6.3 % (ref 0–13.4)
N GONORRHOEA DNA SPEC QL NAA+PROBE: NEGATIVE
NEUTROPHILS # BLD AUTO: 5.96 K/UL (ref 1.82–7.42)
NEUTROPHILS NFR BLD: 75.1 % (ref 44–72)
NRBC # BLD AUTO: 0 K/UL
NRBC BLD-RTO: 0 /100 WBC (ref 0–0.2)
PLATELET # BLD AUTO: 163 K/UL (ref 164–446)
PMV BLD AUTO: 11.5 FL (ref 9–12.9)
RBC # BLD AUTO: 4.53 M/UL (ref 4.2–5.4)
RUBV AB SER QL: 136 IU/ML
SPECIMEN SOURCE: NORMAL
T PALLIDUM AB SER QL IA: ABNORMAL
WBC # BLD AUTO: 7.9 K/UL (ref 4.8–10.8)

## 2023-09-22 LAB
BACTERIA UR CULT: NORMAL
SIGNIFICANT IND 70042: NORMAL
SITE SITE: NORMAL
SOURCE SOURCE: NORMAL

## 2023-09-29 ENCOUNTER — ROUTINE PRENATAL (OUTPATIENT)
Dept: OBGYN | Facility: CLINIC | Age: 23
End: 2023-09-29
Payer: COMMERCIAL

## 2023-09-29 VITALS — DIASTOLIC BLOOD PRESSURE: 62 MMHG | SYSTOLIC BLOOD PRESSURE: 116 MMHG | BODY MASS INDEX: 30.21 KG/M2 | WEIGHT: 176 LBS

## 2023-09-29 DIAGNOSIS — R22.32 LUMP OF AXILLA, LEFT: ICD-10-CM

## 2023-09-29 DIAGNOSIS — Z34.82 ENCOUNTER FOR SUPERVISION OF OTHER NORMAL PREGNANCY, SECOND TRIMESTER: ICD-10-CM

## 2023-09-29 DIAGNOSIS — L72.9 SKIN CYST: ICD-10-CM

## 2023-09-29 PROBLEM — Z3A.01 LESS THAN 8 WEEKS GESTATION OF PREGNANCY: Status: RESOLVED | Noted: 2023-07-31 | Resolved: 2023-09-29

## 2023-09-29 PROCEDURE — 0502F SUBSEQUENT PRENATAL CARE: CPT | Performed by: PHYSICIAN ASSISTANT

## 2023-09-29 PROCEDURE — 3078F DIAST BP <80 MM HG: CPT | Performed by: PHYSICIAN ASSISTANT

## 2023-09-29 PROCEDURE — 3074F SYST BP LT 130 MM HG: CPT | Performed by: PHYSICIAN ASSISTANT

## 2023-09-29 NOTE — PROGRESS NOTES
S: 22 y.o.  at 15w6d presents for routine obstetric follow-up.   Some fetal movement.  No contractions, vaginal bleeding, or leakage of fluid.    Questions answered.    Pt c/o lump to left axilla x 1 month slowly enlarging and painful. Reports similar lump with last pregnancy wile breastfeeding that would express milk when palpated.    Pt c/o small round 'cyst' to skin of left inguinal area. Cyst comes and goes and is painful, Will occasional try to poke and express drainage but it always returns. No redness, warmth, or drainage currently.     O: LMP 06/10/2023   Patients' weight gain, fluid intake and exercise level discussed.  Vitals, fundal height , fetal position, and FHR reviewed on flowsheet  Long, oblong, mobile, soft lump to right axilla with TTP.     Small 0.5cm round mobile lump to skin over right inguinal area. No fluctuance or induration. No erythema or warmth.       A/P:  22 y.o.  at 15w6d presents for routine obstetric follow-up.  Size equals dates and/or scan    - Due for Pap, will schedule appt for this as she is out of time today  - PNL all WNL, NIPT low risk   - msAFP ordered   - Breast US ordered   - Referral to derm per pt request for cyst removal. Advised not to poke or provoke drainage.  - Continue prenatal vitamins- pills not gummies.  - Exercise at least 30 minutes daily. Drink at least 3-4L of water daily      Follow-up in 4 weeks.    Judy Gutierrez P.A.-C.

## 2023-10-03 ENCOUNTER — TELEMEDICINE (OUTPATIENT)
Dept: MEDICAL GROUP | Facility: IMAGING CENTER | Age: 23
End: 2023-10-03
Payer: COMMERCIAL

## 2023-10-03 VITALS — HEIGHT: 64 IN | WEIGHT: 176 LBS | BODY MASS INDEX: 30.05 KG/M2

## 2023-10-03 DIAGNOSIS — Z02.89 ENCOUNTER FOR COMPLETION OF FORM WITH PATIENT: ICD-10-CM

## 2023-10-03 DIAGNOSIS — O44.02 PLACENTA PREVIA IN SECOND TRIMESTER: ICD-10-CM

## 2023-10-03 DIAGNOSIS — L73.9 FOLLICULITIS: ICD-10-CM

## 2023-10-03 PROBLEM — O44.00 PLACENTA PREVIA: Status: ACTIVE | Noted: 2023-10-03

## 2023-10-03 PROCEDURE — 99213 OFFICE O/P EST LOW 20 MIN: CPT | Mod: 95

## 2023-10-03 RX ORDER — CLINDAMYCIN AND BENZOYL PEROXIDE 10; 50 MG/G; MG/G
1 GEL TOPICAL 2 TIMES DAILY
Qty: 35 G | Refills: 0 | Status: SHIPPED | OUTPATIENT
Start: 2023-10-03 | End: 2023-12-21

## 2023-10-03 ASSESSMENT — PAIN SCALES - GENERAL: PAINLEVEL: NO PAIN

## 2023-10-03 NOTE — PROGRESS NOTES
"Virtual Visit: Established Patient   This visit was conducted via Zoom using secure and encrypted videoconferencing technology.   The patient was in their home in the state of [place].    The patient's identity was confirmed and verbal consent was obtained for this virtual visit.    Subjective:   CC:   Chief Complaint   Patient presents with    Paperwork     Accomodation letter for school      Caitlyn Diamond is a 22 y.o. female presenting for evaluation and management of:    Paperwork  Placenta previa  Patient is  at ~ 16 weeks gestational pregnancy.  She last f/u with ob/gyn last week and is due to f/u again next week.   She was recently diagnosed with placenta previa after ED visit for vaginal bleeding.   She is currently going to [employer] school which requires prolonged standing and sitting. She is requesting letter for restriction to prevent complications/  She denies active cramping and vaginal bleeding.    ROS: per hpi  Gen: no fevers/chills  Pulm: no sob, no cough  CV: no chest pain, no palpitations, no edema  GI: no nausea/vomiting, no diarrhea  Skin: no rash      Current medicines (including changes today)  Current Outpatient Medications   Medication Sig Dispense Refill    clindamycin-benzoyl peroxide (BENZACLIN) gel Apply 1 Application  topically 2 times a day. 35 g 0    Prenatal Vit-Fe Fumarate-FA (PRENATAL 1+1 PO) Take  by mouth.       No current facility-administered medications for this visit.       Patient Active Problem List    Diagnosis Date Noted    Placenta previa 10/03/2023    Encounter for supervision of other normal pregnancy, second trimester 2020    History of incarceration 2020    Dysthymic disorder 2015        Objective:   Ht 1.626 m (5' 4\")   Wt 79.8 kg (176 lb)   LMP 06/10/2023   BMI 30.21 kg/m²     Physical Exam:  Constitutional: Alert, no distress, well-groomed.  Skin: No rashes in visible areas.  Eye: Round. Conjunctiva clear, lids normal. No icterus. "   ENMT: Lips pink without lesions, good dentition, moist mucous membranes. Phonation normal.  Neck: No masses, no thyromegaly. Moves freely without pain.  Respiratory: Unlabored respiratory effort, no cough or audible wheeze  Psych: Alert and oriented x3, normal affect and mood.     Assessment and Plan:   The following treatment plan was discussed:     1. Encounter for completion of form with patient  2. Placenta previa in second trimester  New issue. No s/s infectious, active bleeding or labor. Recommend to follow up ob/gyn as scheduled.   Recommend school/work restrictions that include:  -no prolonged sitting and standing over 2 hours at a time  -no heavy lifting anything over 10lbs  -avoid harsh chemical exposure  -no strenuous activity  Recommend healthy diet, increase fluid intake, stress reduction techniques, good sleep hygiene, no smoking or drinking alcohol.  Strict ED precautions discussed.     3. Folliculitis  Chronic, stable with ointment. Med refill sent to pharmacy.     - clindamycin-benzoyl peroxide (BENZACLIN) gel; Apply 1 Application  topically 2 times a day.  Dispense: 35 g; Refill: 0    Medical Decision Making/Course:  In the course of preparing for this visit with review of the pertinent past medical history, recent and past clinic visits, current medications, and performing chart, immunization, medical history and medication reconciliation, and in the further course of obtaining the current history pertinent to the clinic visit today, performing an exam and evaluation, ordering and independently evaluating labs, tests, and/or procedures, prescribing any recommended new medications as noted above, providing any pertinent counseling and education and recommending further coordination of care. This was discussed with patient in a shared-decision making conversation, and they understand and agreed with plan of care.     I spent a total of 28 minutes with record review, exam, communication with the  patient, communication with other providers, and documentation of this encounter.       Follow-up: Return if symptoms worsen or fail to improve.    Thank you, Nandini SOUTH  [facility]

## 2023-10-03 NOTE — LETTER
[facility]  [address] [place] [address]     October 3, 2023    Patient: Caitlyn Diamond   YOB: 2000   Date of Visit: 10/3/2023       To Whom It May Concern:    Caitlyn Diamond was seen and treated in our department on 10/3/2023. Due to Caitlyn's condition, it is advisable that she  return to school/work with restrictions that include:  -no prolonged sitting and standing over 2 hours at a time  -no heavy lifting anything over 10lbs  -avoid harsh chemical exposure  -no strenuous activity    Sincerely,   Nandini Velázquez A.P.R.N.   Electronically signed

## 2023-10-16 ENCOUNTER — HOSPITAL ENCOUNTER (OUTPATIENT)
Dept: RADIOLOGY | Facility: MEDICAL CENTER | Age: 23
End: 2023-10-16
Attending: PHYSICIAN ASSISTANT
Payer: COMMERCIAL

## 2023-10-16 DIAGNOSIS — R22.32 LUMP OF AXILLA, LEFT: ICD-10-CM

## 2023-10-16 PROCEDURE — 76642 ULTRASOUND BREAST LIMITED: CPT | Mod: LT

## 2023-10-20 ENCOUNTER — HOSPITAL ENCOUNTER (EMERGENCY)
Facility: MEDICAL CENTER | Age: 23
End: 2023-10-20
Attending: OBSTETRICS & GYNECOLOGY | Admitting: OBSTETRICS & GYNECOLOGY
Payer: COMMERCIAL

## 2023-10-20 ENCOUNTER — HOSPITAL ENCOUNTER (EMERGENCY)
Facility: MEDICAL CENTER | Age: 23
End: 2023-10-20
Payer: COMMERCIAL

## 2023-10-20 VITALS
TEMPERATURE: 98.8 F | OXYGEN SATURATION: 96 % | SYSTOLIC BLOOD PRESSURE: 105 MMHG | DIASTOLIC BLOOD PRESSURE: 75 MMHG | HEART RATE: 75 BPM | RESPIRATION RATE: 17 BRPM

## 2023-10-20 LAB
ALBUMIN SERPL BCP-MCNC: 3.4 G/DL (ref 3.2–4.9)
ALBUMIN/GLOB SERPL: 1.2 G/DL
ALP SERPL-CCNC: 57 U/L (ref 30–99)
ALT SERPL-CCNC: 8 U/L (ref 2–50)
ANION GAP SERPL CALC-SCNC: 10 MMOL/L (ref 7–16)
AST SERPL-CCNC: 12 U/L (ref 12–45)
B-HCG SERPL-ACNC: ABNORMAL MIU/ML (ref 0–5)
BASOPHILS # BLD AUTO: 0.2 % (ref 0–1.8)
BASOPHILS # BLD: 0.02 K/UL (ref 0–0.12)
BILIRUB SERPL-MCNC: 0.2 MG/DL (ref 0.1–1.5)
BUN SERPL-MCNC: 7 MG/DL (ref 8–22)
CALCIUM ALBUM COR SERPL-MCNC: 9.1 MG/DL (ref 8.5–10.5)
CALCIUM SERPL-MCNC: 8.6 MG/DL (ref 8.5–10.5)
CHLORIDE SERPL-SCNC: 103 MMOL/L (ref 96–112)
CO2 SERPL-SCNC: 23 MMOL/L (ref 20–33)
CREAT SERPL-MCNC: 0.58 MG/DL (ref 0.5–1.4)
EOSINOPHIL # BLD AUTO: 0.16 K/UL (ref 0–0.51)
EOSINOPHIL NFR BLD: 1.3 % (ref 0–6.9)
ERYTHROCYTE [DISTWIDTH] IN BLOOD BY AUTOMATED COUNT: 42.7 FL (ref 35.9–50)
GFR SERPLBLD CREATININE-BSD FMLA CKD-EPI: 129 ML/MIN/1.73 M 2
GLOBULIN SER CALC-MCNC: 2.8 G/DL (ref 1.9–3.5)
GLUCOSE SERPL-MCNC: 128 MG/DL (ref 65–99)
HCT VFR BLD AUTO: 39.2 % (ref 37–47)
HGB BLD-MCNC: 13.1 G/DL (ref 12–16)
IMM GRANULOCYTES # BLD AUTO: 0.04 K/UL (ref 0–0.11)
IMM GRANULOCYTES NFR BLD AUTO: 0.3 % (ref 0–0.9)
LIPASE SERPL-CCNC: 38 U/L (ref 11–82)
LYMPHOCYTES # BLD AUTO: 2.13 K/UL (ref 1–4.8)
LYMPHOCYTES NFR BLD: 17.2 % (ref 22–41)
MCH RBC QN AUTO: 30.3 PG (ref 27–33)
MCHC RBC AUTO-ENTMCNC: 33.4 G/DL (ref 32.2–35.5)
MCV RBC AUTO: 90.7 FL (ref 81.4–97.8)
MONOCYTES # BLD AUTO: 0.44 K/UL (ref 0–0.85)
MONOCYTES NFR BLD AUTO: 3.6 % (ref 0–13.4)
NEUTROPHILS # BLD AUTO: 9.59 K/UL (ref 1.82–7.42)
NEUTROPHILS NFR BLD: 77.4 % (ref 44–72)
NRBC # BLD AUTO: 0 K/UL
NRBC BLD-RTO: 0 /100 WBC (ref 0–0.2)
NUMBER OF RH DOSES IND 8505RD: NORMAL
PLATELET # BLD AUTO: 212 K/UL (ref 164–446)
PMV BLD AUTO: 10.9 FL (ref 9–12.9)
POTASSIUM SERPL-SCNC: 3.4 MMOL/L (ref 3.6–5.5)
PROT SERPL-MCNC: 6.2 G/DL (ref 6–8.2)
RBC # BLD AUTO: 4.32 M/UL (ref 4.2–5.4)
RH BLD: NORMAL
SODIUM SERPL-SCNC: 136 MMOL/L (ref 135–145)
WBC # BLD AUTO: 12.4 K/UL (ref 4.8–10.8)

## 2023-10-20 PROCEDURE — 302449 STATCHG TRIAGE ONLY (STATISTIC)

## 2023-10-20 PROCEDURE — 83690 ASSAY OF LIPASE: CPT

## 2023-10-20 PROCEDURE — 80053 COMPREHEN METABOLIC PANEL: CPT

## 2023-10-20 PROCEDURE — 85025 COMPLETE CBC W/AUTO DIFF WBC: CPT

## 2023-10-20 PROCEDURE — 86901 BLOOD TYPING SEROLOGIC RH(D): CPT

## 2023-10-20 PROCEDURE — 84702 CHORIONIC GONADOTROPIN TEST: CPT

## 2023-10-21 NOTE — ED NOTES
"PT ambulated to desk with a steady gait. PT saying that she has been here for \"over 2 hours\". PT requesting to leave. PT signed paperwork and left with a steady gait.  "
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
## 2023-10-30 ENCOUNTER — APPOINTMENT (OUTPATIENT)
Dept: RADIOLOGY | Facility: MEDICAL CENTER | Age: 23
End: 2023-10-30
Attending: OBSTETRICS & GYNECOLOGY
Payer: COMMERCIAL

## 2023-10-30 PROCEDURE — 76805 OB US >/= 14 WKS SNGL FETUS: CPT

## 2023-11-10 ENCOUNTER — HOSPITAL ENCOUNTER (OUTPATIENT)
Facility: MEDICAL CENTER | Age: 23
End: 2023-11-10
Attending: OBSTETRICS & GYNECOLOGY
Payer: COMMERCIAL

## 2023-11-10 ENCOUNTER — ROUTINE PRENATAL (OUTPATIENT)
Dept: OBGYN | Facility: CLINIC | Age: 23
End: 2023-11-10
Payer: COMMERCIAL

## 2023-11-10 VITALS — DIASTOLIC BLOOD PRESSURE: 71 MMHG | WEIGHT: 180.1 LBS | SYSTOLIC BLOOD PRESSURE: 116 MMHG | BODY MASS INDEX: 30.91 KG/M2

## 2023-11-10 DIAGNOSIS — Z34.82 ENCOUNTER FOR SUPERVISION OF OTHER NORMAL PREGNANCY, SECOND TRIMESTER: ICD-10-CM

## 2023-11-10 DIAGNOSIS — Z87.59 HISTORY OF PYELONEPHRITIS DURING PREGNANCY: Primary | ICD-10-CM

## 2023-11-10 DIAGNOSIS — O44.02 PLACENTA PREVIA IN SECOND TRIMESTER: ICD-10-CM

## 2023-11-10 DIAGNOSIS — Z87.59 HISTORY OF PYELONEPHRITIS DURING PREGNANCY: ICD-10-CM

## 2023-11-10 DIAGNOSIS — Z87.440 HISTORY OF PYELONEPHRITIS DURING PREGNANCY: Primary | ICD-10-CM

## 2023-11-10 DIAGNOSIS — Z87.440 HISTORY OF PYELONEPHRITIS DURING PREGNANCY: ICD-10-CM

## 2023-11-10 LAB
APPEARANCE UR: NORMAL
BILIRUB UR STRIP-MCNC: NORMAL MG/DL
COLOR UR AUTO: NORMAL
GLUCOSE UR STRIP.AUTO-MCNC: NORMAL MG/DL
KETONES UR STRIP.AUTO-MCNC: NORMAL MG/DL
LEUKOCYTE ESTERASE UR QL STRIP.AUTO: NORMAL
NITRITE UR QL STRIP.AUTO: NORMAL
PH UR STRIP.AUTO: 7.5 [PH] (ref 5–8)
PROT UR QL STRIP: NORMAL MG/DL
RBC UR QL AUTO: NORMAL
SP GR UR STRIP.AUTO: 1.02
UROBILINOGEN UR STRIP-MCNC: NORMAL MG/DL

## 2023-11-10 PROCEDURE — 0502F SUBSEQUENT PRENATAL CARE: CPT | Performed by: OBSTETRICS & GYNECOLOGY

## 2023-11-10 PROCEDURE — 3074F SYST BP LT 130 MM HG: CPT | Performed by: OBSTETRICS & GYNECOLOGY

## 2023-11-10 PROCEDURE — 87086 URINE CULTURE/COLONY COUNT: CPT

## 2023-11-10 PROCEDURE — 81002 URINALYSIS NONAUTO W/O SCOPE: CPT | Performed by: OBSTETRICS & GYNECOLOGY

## 2023-11-10 PROCEDURE — 3078F DIAST BP <80 MM HG: CPT | Performed by: OBSTETRICS & GYNECOLOGY

## 2023-11-10 RX ORDER — CEPHALEXIN 250 MG/1
250 CAPSULE ORAL DAILY
Qty: 90 CAPSULE | Refills: 3 | Status: ON HOLD | OUTPATIENT
Start: 2023-11-10 | End: 2024-03-11

## 2023-11-10 RX ORDER — NITROFURANTOIN 25; 75 MG/1; MG/1
CAPSULE ORAL
COMMUNITY
Start: 2023-11-01 | End: 2023-11-30

## 2023-11-10 ASSESSMENT — FIBROSIS 4 INDEX: FIB4 SCORE: 0.48

## 2023-11-12 LAB
BACTERIA UR CULT: NORMAL
SIGNIFICANT IND 70042: NORMAL
SITE SITE: NORMAL
SOURCE SOURCE: NORMAL

## 2023-11-30 ENCOUNTER — ROUTINE PRENATAL (OUTPATIENT)
Dept: OBGYN | Facility: CLINIC | Age: 23
End: 2023-11-30
Payer: COMMERCIAL

## 2023-11-30 VITALS — WEIGHT: 184 LBS | BODY MASS INDEX: 31.58 KG/M2 | SYSTOLIC BLOOD PRESSURE: 112 MMHG | DIASTOLIC BLOOD PRESSURE: 60 MMHG

## 2023-11-30 DIAGNOSIS — Z34.82 ENCOUNTER FOR SUPERVISION OF OTHER NORMAL PREGNANCY IN SECOND TRIMESTER: ICD-10-CM

## 2023-11-30 DIAGNOSIS — Z87.59 HISTORY OF PYELONEPHRITIS DURING PREGNANCY: ICD-10-CM

## 2023-11-30 DIAGNOSIS — Z87.440 HISTORY OF PYELONEPHRITIS DURING PREGNANCY: ICD-10-CM

## 2023-11-30 PROBLEM — O44.00 PLACENTA PREVIA: Status: RESOLVED | Noted: 2023-10-03 | Resolved: 2023-11-30

## 2023-11-30 PROCEDURE — 3074F SYST BP LT 130 MM HG: CPT | Performed by: ADVANCED PRACTICE MIDWIFE

## 2023-11-30 PROCEDURE — 3078F DIAST BP <80 MM HG: CPT | Performed by: ADVANCED PRACTICE MIDWIFE

## 2023-11-30 PROCEDURE — 0502F SUBSEQUENT PRENATAL CARE: CPT | Performed by: ADVANCED PRACTICE MIDWIFE

## 2023-11-30 ASSESSMENT — FIBROSIS 4 INDEX: FIB4 SCORE: 0.48

## 2023-11-30 NOTE — PROGRESS NOTES
Pt is a 23 y.o.   at 24w5d  gestation here for AILYN visit. She reports good fetal movement. Denies vaginal bleeding and denies cramping/regular contractions. She reports overall today she is feeling ok. No recent ER visits since last seen. She reports she is continuing clindamycin gel for acne. Taking keflex for suppression for pyelonephritis history and UTI this pregnancy. Reviewed repeat ultrasound to clear fetal bladder and location of placenta. Previa is resolved and placenta now low lying.  Encouraged daily suppression use. 3rd trimester labs provided today with urine culture.  Adequate hydration reviewed in detail with patient. Precautions and warning signs reviewed with patient.  RTC in 3-4 week(s) for AILYN visit.

## 2023-11-30 NOTE — PROGRESS NOTES
Pt here today for OB follow up  Pt states no complaints   Reports +  Good # [phone]  Pharmacy Confirmed.  Chaperone offered and not indicated.   Pt given 1 hr GTT and instructions   Pt declines Flu vaccine.

## 2023-12-10 ENCOUNTER — OFFICE VISIT (OUTPATIENT)
Dept: URGENT CARE | Facility: PHYSICIAN GROUP | Age: 23
End: 2023-12-10
Payer: COMMERCIAL

## 2023-12-10 ENCOUNTER — HOSPITAL ENCOUNTER (OUTPATIENT)
Facility: MEDICAL CENTER | Age: 23
End: 2023-12-10
Attending: NURSE PRACTITIONER
Payer: COMMERCIAL

## 2023-12-10 VITALS
BODY MASS INDEX: 31.07 KG/M2 | SYSTOLIC BLOOD PRESSURE: 112 MMHG | HEIGHT: 64 IN | WEIGHT: 182 LBS | RESPIRATION RATE: 18 BRPM | OXYGEN SATURATION: 99 % | TEMPERATURE: 98.3 F | DIASTOLIC BLOOD PRESSURE: 68 MMHG | HEART RATE: 74 BPM

## 2023-12-10 DIAGNOSIS — Z11.3 SCREENING FOR STD (SEXUALLY TRANSMITTED DISEASE): ICD-10-CM

## 2023-12-10 LAB
APPEARANCE UR: ABNORMAL
BILIRUB UR STRIP-MCNC: ABNORMAL MG/DL
CANDIDA DNA VAG QL PROBE+SIG AMP: NEGATIVE
COLOR UR AUTO: YELLOW
G VAGINALIS DNA VAG QL PROBE+SIG AMP: NEGATIVE
GLUCOSE UR STRIP.AUTO-MCNC: ABNORMAL MG/DL
KETONES UR STRIP.AUTO-MCNC: ABNORMAL MG/DL
LEUKOCYTE ESTERASE UR QL STRIP.AUTO: ABNORMAL
NITRITE UR QL STRIP.AUTO: ABNORMAL
PH UR STRIP.AUTO: 8.5 [PH] (ref 5–8)
POCT INT CON NEG: NEGATIVE
POCT INT CON POS: NEGATIVE
POCT URINE PREGNANCY TEST: POSITIVE
PROT UR QL STRIP: ABNORMAL MG/DL
RBC UR QL AUTO: ABNORMAL
SP GR UR STRIP.AUTO: 1.02
T VAGINALIS DNA VAG QL PROBE+SIG AMP: NEGATIVE
UROBILINOGEN UR STRIP-MCNC: 0.2 MG/DL

## 2023-12-10 PROCEDURE — 3074F SYST BP LT 130 MM HG: CPT | Performed by: NURSE PRACTITIONER

## 2023-12-10 PROCEDURE — 81002 URINALYSIS NONAUTO W/O SCOPE: CPT | Performed by: NURSE PRACTITIONER

## 2023-12-10 PROCEDURE — 3078F DIAST BP <80 MM HG: CPT | Performed by: NURSE PRACTITIONER

## 2023-12-10 PROCEDURE — 87480 CANDIDA DNA DIR PROBE: CPT

## 2023-12-10 PROCEDURE — 81025 URINE PREGNANCY TEST: CPT | Performed by: NURSE PRACTITIONER

## 2023-12-10 PROCEDURE — 87660 TRICHOMONAS VAGIN DIR PROBE: CPT

## 2023-12-10 PROCEDURE — 87491 CHLMYD TRACH DNA AMP PROBE: CPT

## 2023-12-10 PROCEDURE — 99213 OFFICE O/P EST LOW 20 MIN: CPT | Performed by: NURSE PRACTITIONER

## 2023-12-10 PROCEDURE — 87591 N.GONORRHOEAE DNA AMP PROB: CPT

## 2023-12-10 PROCEDURE — 87510 GARDNER VAG DNA DIR PROBE: CPT

## 2023-12-10 ASSESSMENT — FIBROSIS 4 INDEX: FIB4 SCORE: 0.48

## 2023-12-10 NOTE — PROGRESS NOTES
Date: 12/10/23     Chief Complaint:    Chief Complaint   Patient presents with    Sexually Transmitted Diseases        History of Present Illness: 23 y.o.  female presents to clinic with concern for possible STI STD.  Patient is currently 26 weeks pregnant.  She is having some lower back pain but denies any vaginal symptoms itching irritation change in discharge.  No burning with urination urinary frequency or urgency.  She denies any contraction-like cramping.  She denies any fevers or body aches.  She states that her partner did have a sexual encounter with someone different so she presents to clinic for concern for possible STI STD.  26 weeeks preg       ROS:    As stated in HPI     Medical/SX/ Social History:  Reviewed per chart    Pertinent Medications:    Current Outpatient Medications on File Prior to Visit   Medication Sig Dispense Refill    cephALEXin (KEFLEX) 250 MG Cap Take 1 Capsule by mouth every day. 90 Capsule 3    Prenatal Vit-Fe Fumarate-FA (PRENATAL 1+1 PO) Take  by mouth.      clindamycin-benzoyl peroxide (BENZACLIN) gel Apply 1 Application  topically 2 times a day. (Patient not taking: Reported on 12/10/2023) 35 g 0     No current facility-administered medications on file prior to visit.        Allergies:    Pcn [penicillins]     Problem list, medications, and allergies reviewed by myself today in Epic     Physical Exam:    Vitals:    12/10/23 1326   BP: 112/68   Pulse: 74   Resp: 18   Temp: 36.8 °C (98.3 °F)   SpO2: 99%             Physical Exam  Constitutional:       General: She is not in acute distress.     Appearance: Normal appearance. She is well-developed. She is not ill-appearing or toxic-appearing.   HENT:      Head: Normocephalic and atraumatic.   Cardiovascular:      Rate and Rhythm: Normal rate and regular rhythm.      Heart sounds: Normal heart sounds.   Pulmonary:      Effort: Pulmonary effort is normal. No respiratory distress.      Breath sounds: Normal breath sounds. No  wheezing.   Abdominal:      General: Abdomen is flat. Bowel sounds are normal.      Palpations: Abdomen is soft.      Tenderness: There is no abdominal tenderness. There is no right CVA tenderness, left CVA tenderness, guarding or rebound.   Skin:     General: Skin is warm.      Capillary Refill: Capillary refill takes less than 2 seconds.      Coloration: Skin is not cyanotic or pale.   Neurological:      Mental Status: She is alert and oriented to person, place, and time.      Gait: Gait is intact.   Psychiatric:         Behavior: Behavior normal. Behavior is cooperative.                  Diagnostics:      Results for orders placed or performed in visit on 12/10/23   POCT Urinalysis   Result Value Ref Range    POC Color Yellow Negative    POC Appearance Cloudy Negative    POC Glucose Neg Negative mg/dL    POC Bilirubin Neg Negative mg/dL    POC Ketones Neg Negative mg/dL    POC Specific Gravity 1.020 <1.005 - >1.030    POC Blood Neg Negative    POC Urine PH 8.5 (A) 5.0 - 8.0    POC Protein Neg Negative mg/dL    POC Urobiligen 0.2 Negative (0.2) mg/dL    POC Nitrites Neg Negative    POC Leukocyte Esterase Neg Negative   POCT Pregnancy   Result Value Ref Range    POC Urine Pregnancy Test Positive     Internal Control Positive Negative     Internal Control Negative Negative      Vaginal pathogen swab pending  Gonorrhea chlamydia pending    Diagnostics interpreted by myself    Medical Decision making and plan :  I personally reviewed prior external notes and test results pertinent to today's visit. Pt is clinically stable at today's acute urgent care visit.  Patient appears nontoxic with no acute distress noted. Appropriate for outpatient care at this time. The patient remained stable during the urgent care visit.     Pleasant 23 y.o. female presented clinic with for possible STI STD.  Patient is having some lower back pain although no CVA tenderness.  Back pain likely related to pregnancy.  Advised to increase fluid  and monitor symptoms closely.  Advised that if her back pain worsens or is contraction-like she must seek high-level care.  Low suspicion for  labor at this time.  Advised that if testing indicates treatment is needed we will discuss her MyChart.  Patient did verbalize understanding agree with plan.  Shared decision-making was utilized with patient for treatment plan.  Differential Diagnosis, natural history, and supportive care discussed.            1. Screening for STD (sexually transmitted disease)    - POCT Urinalysis  - POCT Pregnancy  - VAGINAL PATHOGENS DNA PANEL; Future  - Chlamydia/GC, PCR (Genital/Anal swab); Future       Medication discussed included indication for use and the potential benefits and side effects. Education was provided regarding the aforementioned assessments.  All of the patient's questions were answered to their satisfaction at the time of discharge. Patient was encouraged to monitor symptoms closely. Those signs and symptoms which would warrant concern and mandate seeking a higher level of service through the emergency department discussed at length.  Patient stated agreement and understanding of this plan of care.    Disposition:  Home in stable condition       Voice Recognition Disclaimer:  Portions of this document were created using voice recognition software. The software does have a chance of producing errors of grammar and possibly content. I have made every reasonable attempt to correct obvious errors, but there may be errors of grammar and possibly content that I did not discover before finalizing the documentation.    Niesha Bills, MADELAINE.

## 2023-12-19 ENCOUNTER — HOSPITAL ENCOUNTER (OUTPATIENT)
Dept: RADIOLOGY | Facility: MEDICAL CENTER | Age: 23
End: 2023-12-19
Attending: ADVANCED PRACTICE MIDWIFE
Payer: COMMERCIAL

## 2023-12-19 DIAGNOSIS — Z34.82 ENCOUNTER FOR SUPERVISION OF OTHER NORMAL PREGNANCY IN SECOND TRIMESTER: ICD-10-CM

## 2023-12-19 PROCEDURE — 76815 OB US LIMITED FETUS(S): CPT

## 2023-12-20 DIAGNOSIS — L73.9 FOLLICULITIS: ICD-10-CM

## 2023-12-20 PROBLEM — Z78.9 HISTORY OF INCARCERATION: Status: RESOLVED | Noted: 2020-06-02 | Resolved: 2023-12-20

## 2023-12-21 RX ORDER — CLINDAMYCIN AND BENZOYL PEROXIDE 10; 50 MG/G; MG/G
1 GEL TOPICAL 2 TIMES DAILY
Qty: 25 G | Refills: 3 | Status: ON HOLD | OUTPATIENT
Start: 2023-12-21 | End: 2024-03-11

## 2023-12-26 ENCOUNTER — ROUTINE PRENATAL (OUTPATIENT)
Dept: OBGYN | Facility: CLINIC | Age: 23
End: 2023-12-26
Payer: COMMERCIAL

## 2023-12-26 VITALS — DIASTOLIC BLOOD PRESSURE: 52 MMHG | WEIGHT: 183 LBS | BODY MASS INDEX: 31.41 KG/M2 | SYSTOLIC BLOOD PRESSURE: 100 MMHG

## 2023-12-26 DIAGNOSIS — N76.0 VAGINITIS AFFECTING PREGNANCY, ANTEPARTUM: ICD-10-CM

## 2023-12-26 DIAGNOSIS — O23.599 VAGINITIS AFFECTING PREGNANCY, ANTEPARTUM: ICD-10-CM

## 2023-12-26 DIAGNOSIS — Z34.83 ENCOUNTER FOR SUPERVISION OF OTHER NORMAL PREGNANCY, THIRD TRIMESTER: ICD-10-CM

## 2023-12-26 PROCEDURE — 3074F SYST BP LT 130 MM HG: CPT | Performed by: ADVANCED PRACTICE MIDWIFE

## 2023-12-26 PROCEDURE — 3078F DIAST BP <80 MM HG: CPT | Performed by: ADVANCED PRACTICE MIDWIFE

## 2023-12-26 PROCEDURE — 0502F SUBSEQUENT PRENATAL CARE: CPT | Performed by: ADVANCED PRACTICE MIDWIFE

## 2023-12-26 RX ORDER — BREAST PUMP
EACH MISCELLANEOUS
Qty: 1 EACH | Refills: 0 | Status: SHIPPED
Start: 2023-12-26

## 2023-12-26 ASSESSMENT — FIBROSIS 4 INDEX: FIB4 SCORE: 0.48

## 2023-12-26 NOTE — PROGRESS NOTES
OB follow up   + fetal movement.  No VB, LOF or UC's.  Phone # [phone] (home) [phone] (work)  Preferred pharmacy confirmed.  Flu vaccine declined   SANTOS given   TDAP- ants to get it next visit   BTL- declined     3rd labs will get done sometime next week

## 2023-12-26 NOTE — LETTER
"Count Your Baby's Movements  Another step to a healthy delivery    A Epic Dress Re Test             Dept: [phone]    How Many Weeks Pregnant? Unknown    Date to Begin Countin23              How to use this chart    One way for your physician to keep track of your baby's health is by knowing how often the baby moves (or \"kicks\") in your womb.  You can help your physician to do this by using this chart every day.    Every day, you should see how many hours it takes for your baby to move 10 times.  Start in the morning, as soon as you get up.    First, write down the time your baby moves until you get to 10.  Check off one box every time your baby moves until you get to 10.  Write down the time you finished counting in the last column.  Total how long it took to count up all 10 movements.  Finally, fill in the box that shows how long this took.  After counting 10 movements, you no longer have to count any more that day.  The next morning, just start counting again as soon as you get up.    What should you call a \"movement\"?  It is hard to say, because it will feel different from one mother to another and from one pregnancy to the next.  The important thing is that you count the movements the same way throughout your pregnancy.  If you have more questions, you should ask your physician.    Count carefully every day!  SAMPLE:  Week 28    How many hours did it take to feel 10 movements?       Start  Time     1     2     3     4     5     6     7     8     9     10   Finish Time   Mon 8:20           11:40                  Fri               Sat               Sun                 IMPORTANT: You should contact your physician if it takes more than two hours for you to feel 10 movements.  Each morning, write down the time and start to count the movements of your baby.  Keep track by checking off one box every time you feel one movement.  When you have felt 10 \"kicks\", write " down the time you finished counting in the last column.  Then fill in the   box (over the check domingo) for the number of hours it took.  Be sure to read the complete instructions on the previous page.

## 2023-12-26 NOTE — PROGRESS NOTES
Pt is a 23 y.o.   at 28w3d  gestation here for AILYN visit. She reports affirms fetal movement. Denies vaginal bleeding and denies cramping/regular contractions. She reports overall today she is feeling well but concerned about vaginal infection. She reports she found out partner had sexual encounter with a different woman in the past month. She would like testing. She also reports concern today about why she continue with recurrent yeast infections. Denies recent ER visits since last seen. Discussed with patient vaginitis panel. Consider mycoplasma if all testing is negative. Still outstanding for 3rd trimester labs and will completed ASAP.  Adequate hydration reviewed in detail with patient. Precautions and warning signs reviewed with patient.  RTC in 2 week(s) for AILYN visit.     Consider that suppressive therapy for pyelo may be contributing to recurrent infection.

## 2024-01-09 ENCOUNTER — HOSPITAL ENCOUNTER (EMERGENCY)
Facility: MEDICAL CENTER | Age: 24
End: 2024-01-09
Attending: OBSTETRICS & GYNECOLOGY | Admitting: OBSTETRICS & GYNECOLOGY
Payer: COMMERCIAL

## 2024-01-09 VITALS
OXYGEN SATURATION: 93 % | HEART RATE: 89 BPM | BODY MASS INDEX: 31.24 KG/M2 | HEIGHT: 64 IN | WEIGHT: 183 LBS | TEMPERATURE: 97.7 F | RESPIRATION RATE: 17 BRPM | DIASTOLIC BLOOD PRESSURE: 56 MMHG | SYSTOLIC BLOOD PRESSURE: 112 MMHG

## 2024-01-09 DIAGNOSIS — Z34.92 NORMAL PREGNANCY, SECOND TRIMESTER: ICD-10-CM

## 2024-01-09 LAB
APPEARANCE UR: CLEAR
C TRACH DNA GENITAL QL NAA+PROBE: NEGATIVE
COLOR UR AUTO: YELLOW
GLUCOSE UR QL STRIP.AUTO: NEGATIVE MG/DL
KETONES UR QL STRIP.AUTO: NEGATIVE MG/DL
LEUKOCYTE ESTERASE UR QL STRIP.AUTO: NEGATIVE
N GONORRHOEA DNA GENITAL QL NAA+PROBE: NEGATIVE
NITRITE UR QL STRIP.AUTO: NEGATIVE
PH UR STRIP.AUTO: 7 [PH] (ref 5–8)
PROT UR QL STRIP: NEGATIVE MG/DL
RBC UR QL AUTO: NEGATIVE
SP GR UR STRIP.AUTO: 1.01 (ref 1–1.03)
SPECIMEN SOURCE: NORMAL

## 2024-01-09 PROCEDURE — 99282 EMERGENCY DEPT VISIT SF MDM: CPT

## 2024-01-09 PROCEDURE — 87591 N.GONORRHOEAE DNA AMP PROB: CPT

## 2024-01-09 PROCEDURE — 87491 CHLMYD TRACH DNA AMP PROBE: CPT

## 2024-01-09 PROCEDURE — 99283 EMERGENCY DEPT VISIT LOW MDM: CPT | Mod: GC | Performed by: STUDENT IN AN ORGANIZED HEALTH CARE EDUCATION/TRAINING PROGRAM

## 2024-01-09 PROCEDURE — 81002 URINALYSIS NONAUTO W/O SCOPE: CPT

## 2024-01-09 PROCEDURE — 59025 FETAL NON-STRESS TEST: CPT

## 2024-01-09 ASSESSMENT — PAIN SCALES - GENERAL: PAINLEVEL: 5 - MODERATE PAIN

## 2024-01-09 ASSESSMENT — FIBROSIS 4 INDEX: FIB4 SCORE: 0.48

## 2024-01-09 NOTE — PROGRESS NOTES
1332-pt here from home with c/o pain when the baby moves and lower abdomin pain. Pt denies any contractions, leaking or bleeding and reports + FM. Pt placed on monitors (us and toco) POC discussed with pt.   1340-Report given to Dr Hood.   1400-MD at bedside.  1425-Dr Hood at bedside. Vaginal spec performed, labs collected. POC discussed  1449- Discharge order received   1457-Discharge instructions reviewed with pt. Pt states understanding and knows to follow up in the clinic for results. Pt denies any questions at this time.   1505-Pt discharged home

## 2024-01-09 NOTE — ED PROVIDER NOTES
LABOR & DELIVERY TRIAGE HISTORY AND PHYSICAL  Patient Name: Caitlyn Diamond Age/Sex: 23 y.o. female  : 2000 MRN: [id number]      HISTORY OF PRESENT ILLNESS:   Caitlyn Diamond is a 23 y.o.  at 30w3d weeks gestation by LMP  who is here for abdominal discomfort and pain with movement of baby.     Patient reports that she feels pain in her groin with rapid movements such as sitting up or turning in bed. The pain also worsens with walking. She states that it improves with lying down.     She also reports a yellow discharge on and off during pregnancy. She states that at times it is itchy. She is concerned for STIs as her partner recently had intercourse with another woman.     Hydration: drinks a gallon of water daily     Fetal movement: present   Leakage of Fluid: denies   Vaginal Bleeding: denies   Contractions: denies     Her EDC is 3/16/2024, by Last Menstrual Period.   She has received [facility] with [facility].  Complications during pregnancy:   - recurrent pyelonephritis is not taking her keflex daily as prescribed     OBSTETRICAL HISTORY:    OB History    Para Term  AB Living   7 2 2   4 2   SAB IAB Ectopic Molar Multiple Live Births   1 3       2      # Outcome Date GA Lbr Ranjeet/2nd Weight Sex Delivery Anes PTL Lv   7 Current            6 Term 20 38w3d 01:50 / 00:33 3.62 kg (7 lb 15.7 oz) M Vag-Spont EPI  GWENDOLYN   5 IAB 2019           4 IAB 2018           3 Term 17 40w0d  3.629 kg (8 lb) M Vag-Spont EPI  GWENDOLYN   2 SAB            1 IAB                MEDICAL HISTORY:    Past Medical History:   Diagnosis Date    Depression 2020       SURGICAL HISTORY:    Past Surgical History:   Procedure Laterality Date    LIPOSUCTION      MAMMOPLASTY AUGMENTATION      OTHER      Nose surgery       MEDICATIONS:    Medications Prior to Admission   Medication Sig Dispense Refill Last Dose    Misc. Devices (BREAST PUMP) Misc For breastfeeding difficulty or milk storage 1 Each 0      clindamycin-benzoyl peroxide (BENZACLIN) gel APPLY TO AFFECTED AREA TWICE A DAY 25 g 3     cephALEXin (KEFLEX) 250 MG Cap Take 1 Capsule by mouth every day. 90 Capsule 3     Prenatal Vit-Fe Fumarate-FA (PRENATAL 1+1 PO) Take  by mouth.          ALLERGIES:    Allergies   Allergen Reactions    Pcn [Penicillins] Rash     Rash        SOCIAL HISTORY:   Tobacco use: denies   Alcohol use: denies   Recreational drug use: denies    reports that she has never smoked. She has never used smokeless tobacco. She reports current alcohol use. She reports that she does not currently use drugs after having used the following drugs: Inhaled and Marijuana.    FAMILY HISTORY:  Clotting/bleeding disorders: denies   Gynecological cancers: denies   Family History   Problem Relation Age of Onset    Breast Cancer Maternal Grandmother     Cancer Maternal Grandmother     Cancer Maternal Grandfather        REVIEW OF SYSTEMS:  Pertinent items are noted in HPI.      PHYSICAL EXAM:  Pulse:  [80-89] 89  BP: (112)/(56) 112/56  SpO2:  [86 %-93 %] 93 %  There is no height or weight on file to calculate BMI.    General:  AAOx3, NAD   CV: RRR, no M/R/G   Pulmonary:  CTAB, normal effort   Abdomen: Soft and non-tender, gravid uterus   FHT: 150/moderate variability/ +accels/ no decels    Contractions: none       Prenatal Labs:  HepBSAg NR  HIV NR   RPR NR  Rubella immune   ABO O+    Group B Strep: not yet completed due to gestational age       ASSESSMENT/ PLAN:   Caitlyn Diamond is a 23 y.o.  at 30w3d gestation by LMP  who is here for groin pain and vaginal discharge.    #SIUP at 30w3d   -prenatal care with [facility]  - prenatal labs wnl  - has appointment for 24    #Groin pain likely 2/2 Round ligament pain  - discussed ways to alleviate round ligament pain such as tylenol prn not to exceed 4g, heat packs, resting and support belts    #Vaginal discharge   - Wet mount  - Vaginal pathogens   - follow-up with results in clinic, can continue  monostat as needed    #Hx of recurrent pyelonephritis  -urinalysis     Dispo: Patient with reactive NST can be discharged home with follow-up in clinic on 1/11/24. Return precautions given.      Case discussed with Dr. Garcia, [facility]      Haley Hood MD   PGY-1 Resident   [facility]

## 2024-01-11 ENCOUNTER — APPOINTMENT (OUTPATIENT)
Dept: OBGYN | Facility: CLINIC | Age: 24
End: 2024-01-11
Payer: COMMERCIAL

## 2024-01-24 ENCOUNTER — APPOINTMENT (OUTPATIENT)
Dept: LAB | Facility: MEDICAL CENTER | Age: 24
End: 2024-01-24
Payer: COMMERCIAL

## 2024-01-25 ENCOUNTER — HOSPITAL ENCOUNTER (OUTPATIENT)
Facility: MEDICAL CENTER | Age: 24
End: 2024-01-25
Attending: ADVANCED PRACTICE MIDWIFE
Payer: COMMERCIAL

## 2024-01-25 ENCOUNTER — ROUTINE PRENATAL (OUTPATIENT)
Dept: OBGYN | Facility: CLINIC | Age: 24
End: 2024-01-25
Payer: COMMERCIAL

## 2024-01-25 VITALS — BODY MASS INDEX: 31.65 KG/M2 | SYSTOLIC BLOOD PRESSURE: 104 MMHG | WEIGHT: 184.4 LBS | DIASTOLIC BLOOD PRESSURE: 58 MMHG

## 2024-01-25 DIAGNOSIS — O23.599 VAGINITIS AFFECTING PREGNANCY, ANTEPARTUM: ICD-10-CM

## 2024-01-25 DIAGNOSIS — N76.0 VAGINITIS AFFECTING PREGNANCY, ANTEPARTUM: ICD-10-CM

## 2024-01-25 LAB — AMBIGUOUS DTTM AMBI4: NORMAL

## 2024-01-25 PROCEDURE — 3078F DIAST BP <80 MM HG: CPT | Performed by: ADVANCED PRACTICE MIDWIFE

## 2024-01-25 PROCEDURE — 87481 CANDIDA DNA AMP PROBE: CPT

## 2024-01-25 PROCEDURE — 3074F SYST BP LT 130 MM HG: CPT | Performed by: ADVANCED PRACTICE MIDWIFE

## 2024-01-25 PROCEDURE — 87510 GARDNER VAG DNA DIR PROBE: CPT

## 2024-01-25 PROCEDURE — 0502F SUBSEQUENT PRENATAL CARE: CPT | Performed by: ADVANCED PRACTICE MIDWIFE

## 2024-01-25 PROCEDURE — 87660 TRICHOMONAS VAGIN DIR PROBE: CPT

## 2024-01-25 PROCEDURE — 87480 CANDIDA DNA DIR PROBE: CPT

## 2024-01-25 PROCEDURE — 87661 TRICHOMONAS VAGINALIS AMPLIF: CPT

## 2024-01-25 PROCEDURE — 81513 NFCT DS BV RNA VAG FLU ALG: CPT

## 2024-01-25 ASSESSMENT — FIBROSIS 4 INDEX: FIB4 SCORE: 0.48

## 2024-01-25 NOTE — PROGRESS NOTES
Pt is a 23 y.o.   at 32w5d  gestation here for AILYN visit. She reports affirms fetal movement. Denies vaginal bleeding and denies cramping/regular contractions. She reports overall today she is feeling well. No recent ER visits since last seen. Continuing with vaginal discharge. Myocplasma and vag path swab sent today. Folliculitis and normal vaginal tissue.  Adequate hydration reviewed in detail with patient. Precautions and warning signs reviewed with patient.  RTC in 2 week(s) for AILYN visit.

## 2024-01-25 NOTE — PROGRESS NOTES
Pt here today for OBFV   FM movemnet pt states baby moving less then he has been for the last couple days   No VB, LOF. Uc's   Phone number [phone]   Pharmacy verified   Pt states she has discharge it is yellow and yesterday it smelled like fish for whole pregnancy    Wants to see if she can get tested for BV and gcct but not for STD  Pt states she will do her labs on february 5

## 2024-01-26 LAB
CANDIDA DNA VAG QL PROBE+SIG AMP: POSITIVE
G VAGINALIS DNA VAG QL PROBE+SIG AMP: NEGATIVE
T VAGINALIS DNA VAG QL PROBE+SIG AMP: NEGATIVE

## 2024-01-27 LAB — TEST NAME 95000: ABNORMAL

## 2024-01-27 RX ORDER — FLUCONAZOLE 150 MG/1
TABLET ORAL
Qty: 2 TABLET | Refills: 0 | Status: ON HOLD | OUTPATIENT
Start: 2024-01-27 | End: 2024-03-11

## 2024-01-29 ENCOUNTER — APPOINTMENT (OUTPATIENT)
Dept: MEDICAL GROUP | Facility: IMAGING CENTER | Age: 24
End: 2024-01-29
Payer: COMMERCIAL

## 2024-01-30 ENCOUNTER — PATIENT MESSAGE (OUTPATIENT)
Dept: OBGYN | Facility: CLINIC | Age: 24
End: 2024-01-30
Payer: COMMERCIAL

## 2024-01-31 ENCOUNTER — TELEPHONE (OUTPATIENT)
Dept: OBGYN | Facility: CLINIC | Age: 24
End: 2024-01-31
Payer: COMMERCIAL

## 2024-01-31 NOTE — TELEPHONE ENCOUNTER
Patient called wanting to talk to Provider Kassy. I let the patient know that her Provider was not at this location but I could send her a message. I asked the patient what was going on and she let me know that she is 33 weeks pregnant. Patient is having lots of pressure down there and it is swollen. I did let the patient know that I recommend her to go to L&D to get evaluated. Patient stated that when she goes to  L&D they do not do anything.

## 2024-02-05 ENCOUNTER — APPOINTMENT (OUTPATIENT)
Dept: LAB | Facility: MEDICAL CENTER | Age: 24
End: 2024-02-05
Payer: COMMERCIAL

## 2024-02-09 ENCOUNTER — ROUTINE PRENATAL (OUTPATIENT)
Dept: OBGYN | Facility: CLINIC | Age: 24
End: 2024-02-09
Payer: COMMERCIAL

## 2024-02-09 VITALS — DIASTOLIC BLOOD PRESSURE: 64 MMHG | BODY MASS INDEX: 31.24 KG/M2 | WEIGHT: 182 LBS | SYSTOLIC BLOOD PRESSURE: 114 MMHG

## 2024-02-09 DIAGNOSIS — N81.10 VAGINAL PROLAPSE: ICD-10-CM

## 2024-02-09 PROCEDURE — 3078F DIAST BP <80 MM HG: CPT | Performed by: ADVANCED PRACTICE MIDWIFE

## 2024-02-09 PROCEDURE — 3074F SYST BP LT 130 MM HG: CPT | Performed by: ADVANCED PRACTICE MIDWIFE

## 2024-02-09 PROCEDURE — 0502F SUBSEQUENT PRENATAL CARE: CPT | Performed by: ADVANCED PRACTICE MIDWIFE

## 2024-02-09 ASSESSMENT — FIBROSIS 4 INDEX: FIB4 SCORE: 0.48

## 2024-02-09 NOTE — PROGRESS NOTES
Pt is a 23 y.o.   at 34w6d  gestation here for AILYN visit. She reports affirms fetal movement. Denies vaginal bleeding and denies cramping/regular contractions. She reports overall today she is feeling concerned about pelvic floor. No recent ER visits since last seen. Planning GBS at next visit. Vaginitis complaints still present but resolving. Adequate hydration reviewed in detail with patient. Precautions and warning signs reviewed with patient.  RTC in 1 week(s) for AILYN visit.    Patient had vaginal examination at last visit. Edematous tissue without full assessment of prolapse. However, per patient reports, there is a higher probability of cystocele and/or rectocele.  Advancing gestational age is likely contributing.  Patient did reach out to Dr. Box and she is planning to evaluate. First will refer to pelvic floor PT for more thorough assessment and refer to Dr. Box as appropriate.

## 2024-02-14 NOTE — PROGRESS NOTES
Patient Instructions by Jason Huynh at 01/04/18 02:21 PM     Author:  Jason Huynh Service:  (none) Author Type:  Liz     [phone]    Pulmonary at the [facility] or the [facility] - Phone # is [phone]    Additional Educational Resources:  For additional resources regarding your symptoms, diagnosis, or further health information, please visit the Health Resources section on [email or web address] or the Online Health Resources section in [employer].        Revision History        User Key Date/Time User Provider Type Action    > [N/A] 01/04/18 02:27 PM Jason Huynh Addend     [N/A] 01/04/18 02:26 PM Jason Huynh Addend     [N/A] 01/04/18 02:21 PM Jason Huynh Sign             Referral sent

## 2024-02-16 ENCOUNTER — HOSPITAL ENCOUNTER (OUTPATIENT)
Facility: MEDICAL CENTER | Age: 24
End: 2024-02-16
Attending: ADVANCED PRACTICE MIDWIFE
Payer: COMMERCIAL

## 2024-02-16 ENCOUNTER — ROUTINE PRENATAL (OUTPATIENT)
Dept: OBGYN | Facility: CLINIC | Age: 24
End: 2024-02-16
Payer: COMMERCIAL

## 2024-02-16 VITALS — DIASTOLIC BLOOD PRESSURE: 62 MMHG | WEIGHT: 183 LBS | BODY MASS INDEX: 31.41 KG/M2 | SYSTOLIC BLOOD PRESSURE: 110 MMHG

## 2024-02-16 DIAGNOSIS — Z34.83 ENCOUNTER FOR SUPERVISION OF OTHER NORMAL PREGNANCY, THIRD TRIMESTER: ICD-10-CM

## 2024-02-16 PROCEDURE — 87081 CULTURE SCREEN ONLY: CPT

## 2024-02-16 PROCEDURE — 3074F SYST BP LT 130 MM HG: CPT | Performed by: ADVANCED PRACTICE MIDWIFE

## 2024-02-16 PROCEDURE — 3078F DIAST BP <80 MM HG: CPT | Performed by: ADVANCED PRACTICE MIDWIFE

## 2024-02-16 PROCEDURE — 0502F SUBSEQUENT PRENATAL CARE: CPT | Performed by: ADVANCED PRACTICE MIDWIFE

## 2024-02-16 PROCEDURE — 87150 DNA/RNA AMPLIFIED PROBE: CPT

## 2024-02-16 ASSESSMENT — FIBROSIS 4 INDEX: FIB4 SCORE: 0.48

## 2024-02-16 NOTE — PROGRESS NOTES
Pt here today for OB follow up  Pt states she is experiencing a lot of pelvic pain   Reports +FM  Good # [phone]  Pharmacy Confirmed.  Chaperone offered and not indcated.   GBS today

## 2024-02-17 LAB — GP B STREP DNA SPEC QL NAA+PROBE: NEGATIVE

## 2024-02-23 ENCOUNTER — ROUTINE PRENATAL (OUTPATIENT)
Dept: OBGYN | Facility: CLINIC | Age: 24
End: 2024-02-23
Payer: COMMERCIAL

## 2024-02-23 VITALS — WEIGHT: 184 LBS | BODY MASS INDEX: 31.58 KG/M2 | DIASTOLIC BLOOD PRESSURE: 50 MMHG | SYSTOLIC BLOOD PRESSURE: 94 MMHG

## 2024-02-23 DIAGNOSIS — Z34.82 ENCOUNTER FOR SUPERVISION OF OTHER NORMAL PREGNANCY, SECOND TRIMESTER: ICD-10-CM

## 2024-02-23 PROCEDURE — 3078F DIAST BP <80 MM HG: CPT | Performed by: PHYSICIAN ASSISTANT

## 2024-02-23 PROCEDURE — 3074F SYST BP LT 130 MM HG: CPT | Performed by: PHYSICIAN ASSISTANT

## 2024-02-23 PROCEDURE — 0502F SUBSEQUENT PRENATAL CARE: CPT | Performed by: PHYSICIAN ASSISTANT

## 2024-02-23 ASSESSMENT — FIBROSIS 4 INDEX: FIB4 SCORE: 0.48

## 2024-02-23 NOTE — PROGRESS NOTES
Pt has no complaints with cramping, VB, LOF though pt has had irregular UCs. Pt wanting IOL asap, scheduled 3/9 but will move to pm per pt preference. +FM. US confirms vtx today. GBS neg - pt aware. Labor precautions stressed today. Daily FKC and walks recommended. RTC 1 wk or sooner prn.

## 2024-02-23 NOTE — PROGRESS NOTES
Pt. Here for OB/FU. Reports Good FM.   Good # [phone] (home)   Pt. States has been having contractions that come and go.   Pt is scheduled for an IOL on 3/9/24 at 09:00 am, pt aware but would like to change date to 3/8/2024 if possible.

## 2024-02-28 ENCOUNTER — ROUTINE PRENATAL (OUTPATIENT)
Dept: OBGYN | Facility: CLINIC | Age: 24
End: 2024-02-28
Payer: COMMERCIAL

## 2024-02-28 VITALS — WEIGHT: 184 LBS | DIASTOLIC BLOOD PRESSURE: 64 MMHG | SYSTOLIC BLOOD PRESSURE: 100 MMHG | BODY MASS INDEX: 31.58 KG/M2

## 2024-02-28 DIAGNOSIS — Z34.83 ENCOUNTER FOR SUPERVISION OF OTHER NORMAL PREGNANCY, THIRD TRIMESTER: ICD-10-CM

## 2024-02-28 PROCEDURE — 0502F SUBSEQUENT PRENATAL CARE: CPT | Performed by: ADVANCED PRACTICE MIDWIFE

## 2024-02-28 PROCEDURE — 3078F DIAST BP <80 MM HG: CPT | Performed by: ADVANCED PRACTICE MIDWIFE

## 2024-02-28 PROCEDURE — 3074F SYST BP LT 130 MM HG: CPT | Performed by: ADVANCED PRACTICE MIDWIFE

## 2024-02-28 ASSESSMENT — FIBROSIS 4 INDEX: FIB4 SCORE: 0.48

## 2024-02-28 NOTE — PROGRESS NOTES
Pt here today for OB follow up  Pt states she has been experiencing nausea. Pt states she is still able to keep food and liquids down.  Reports +FM  Good # [phone]  Pharmacy Confirmed.  Chaperone offered and not indicated.   GBS negative   Pt is scheduled for an IOL on 3/9/24 at 09:00 pm (changed per pt preference). Pt given instructions.

## 2024-03-08 ENCOUNTER — ROUTINE PRENATAL (OUTPATIENT)
Dept: OBGYN | Facility: CLINIC | Age: 24
End: 2024-03-08
Payer: COMMERCIAL

## 2024-03-08 VITALS — WEIGHT: 185 LBS | DIASTOLIC BLOOD PRESSURE: 54 MMHG | SYSTOLIC BLOOD PRESSURE: 100 MMHG | BODY MASS INDEX: 31.76 KG/M2

## 2024-03-08 DIAGNOSIS — Z34.83 ENCOUNTER FOR SUPERVISION OF OTHER NORMAL PREGNANCY, THIRD TRIMESTER: ICD-10-CM

## 2024-03-08 PROCEDURE — 0502F SUBSEQUENT PRENATAL CARE: CPT | Performed by: ADVANCED PRACTICE MIDWIFE

## 2024-03-08 PROCEDURE — 3078F DIAST BP <80 MM HG: CPT | Performed by: ADVANCED PRACTICE MIDWIFE

## 2024-03-08 PROCEDURE — 3074F SYST BP LT 130 MM HG: CPT | Performed by: ADVANCED PRACTICE MIDWIFE

## 2024-03-08 ASSESSMENT — FIBROSIS 4 INDEX: FIB4 SCORE: 0.48

## 2024-03-08 NOTE — PROGRESS NOTES
Pt here today for OB follow up  Pt states no complaints   Reports +FM  Good # [phone]  Pharmacy Confirmed.  Chaperone offered and not indicated.   GBS negative.   Pt is scheduled for an IOL on 3/9/24 at 09:00 pm (changed per pt preference). Pt aware of date and time.

## 2024-03-09 ENCOUNTER — APPOINTMENT (OUTPATIENT)
Dept: OBGYN | Facility: MEDICAL CENTER | Age: 24
End: 2024-03-09
Attending: OBSTETRICS & GYNECOLOGY
Payer: COMMERCIAL

## 2024-03-09 ENCOUNTER — HOSPITAL ENCOUNTER (INPATIENT)
Facility: MEDICAL CENTER | Age: 24
LOS: 2 days | End: 2024-03-11
Attending: OBSTETRICS & GYNECOLOGY | Admitting: OBSTETRICS & GYNECOLOGY
Payer: COMMERCIAL

## 2024-03-09 DIAGNOSIS — Z34.82 ENCOUNTER FOR SUPERVISION OF OTHER NORMAL PREGNANCY, SECOND TRIMESTER: ICD-10-CM

## 2024-03-09 LAB
BASOPHILS # BLD AUTO: 0.4 % (ref 0–1.8)
BASOPHILS # BLD: 0.04 K/UL (ref 0–0.12)
EOSINOPHIL # BLD AUTO: 0.12 K/UL (ref 0–0.51)
EOSINOPHIL NFR BLD: 1.3 % (ref 0–6.9)
ERYTHROCYTE [DISTWIDTH] IN BLOOD BY AUTOMATED COUNT: 44.7 FL (ref 35.9–50)
HCT VFR BLD AUTO: 35.3 % (ref 37–47)
HGB BLD-MCNC: 11.7 G/DL (ref 12–16)
HOLDING TUBE BB 8507: NORMAL
IMM GRANULOCYTES # BLD AUTO: 0.01 K/UL (ref 0–0.11)
IMM GRANULOCYTES NFR BLD AUTO: 0.1 % (ref 0–0.9)
LYMPHOCYTES # BLD AUTO: 2.24 K/UL (ref 1–4.8)
LYMPHOCYTES NFR BLD: 24.5 % (ref 22–41)
MCH RBC QN AUTO: 28.1 PG (ref 27–33)
MCHC RBC AUTO-ENTMCNC: 33.1 G/DL (ref 32.2–35.5)
MCV RBC AUTO: 84.9 FL (ref 81.4–97.8)
MONOCYTES # BLD AUTO: 0.68 K/UL (ref 0–0.85)
MONOCYTES NFR BLD AUTO: 7.4 % (ref 0–13.4)
NEUTROPHILS # BLD AUTO: 6.07 K/UL (ref 1.82–7.42)
NEUTROPHILS NFR BLD: 66.3 % (ref 44–72)
NRBC # BLD AUTO: 0 K/UL
NRBC BLD-RTO: 0 /100 WBC (ref 0–0.2)
PLATELET # BLD AUTO: 226 K/UL (ref 164–446)
PMV BLD AUTO: 10.6 FL (ref 9–12.9)
RBC # BLD AUTO: 4.16 M/UL (ref 4.2–5.4)
T PALLIDUM AB SER QL IA: NORMAL
WBC # BLD AUTO: 9.2 K/UL (ref 4.8–10.8)

## 2024-03-09 PROCEDURE — 700111 HCHG RX REV CODE 636 W/ 250 OVERRIDE (IP): Performed by: OBSTETRICS & GYNECOLOGY

## 2024-03-09 PROCEDURE — 700105 HCHG RX REV CODE 258: Performed by: OBSTETRICS & GYNECOLOGY

## 2024-03-09 PROCEDURE — 770002 HCHG ROOM/CARE - OB PRIVATE (112)

## 2024-03-09 PROCEDURE — 86780 TREPONEMA PALLIDUM: CPT

## 2024-03-09 PROCEDURE — 36415 COLL VENOUS BLD VENIPUNCTURE: CPT

## 2024-03-09 PROCEDURE — 85025 COMPLETE CBC W/AUTO DIFF WBC: CPT

## 2024-03-09 RX ORDER — SODIUM CHLORIDE, SODIUM LACTATE, POTASSIUM CHLORIDE, CALCIUM CHLORIDE 600; 310; 30; 20 MG/100ML; MG/100ML; MG/100ML; MG/100ML
1000 INJECTION, SOLUTION INTRAVENOUS CONTINUOUS
Status: ACTIVE | OUTPATIENT
Start: 2024-03-09 | End: 2024-03-10

## 2024-03-09 RX ORDER — DEXTROSE, SODIUM CHLORIDE, SODIUM LACTATE, POTASSIUM CHLORIDE, AND CALCIUM CHLORIDE 5; .6; .31; .03; .02 G/100ML; G/100ML; G/100ML; G/100ML; G/100ML
INJECTION, SOLUTION INTRAVENOUS CONTINUOUS
Status: DISCONTINUED | OUTPATIENT
Start: 2024-03-10 | End: 2024-03-11 | Stop reason: HOSPADM

## 2024-03-09 RX ORDER — MISOPROSTOL 200 UG/1
800 TABLET ORAL
Status: DISCONTINUED | OUTPATIENT
Start: 2024-03-09 | End: 2024-03-10 | Stop reason: HOSPADM

## 2024-03-09 RX ORDER — IBUPROFEN 800 MG/1
800 TABLET ORAL
Status: DISCONTINUED | OUTPATIENT
Start: 2024-03-09 | End: 2024-03-10 | Stop reason: HOSPADM

## 2024-03-09 RX ORDER — TERBUTALINE SULFATE 1 MG/ML
0.25 INJECTION, SOLUTION SUBCUTANEOUS
Status: DISCONTINUED | OUTPATIENT
Start: 2024-03-09 | End: 2024-03-10 | Stop reason: HOSPADM

## 2024-03-09 RX ORDER — LIDOCAINE HYDROCHLORIDE 10 MG/ML
20 INJECTION, SOLUTION INFILTRATION; PERINEURAL
Status: DISCONTINUED | OUTPATIENT
Start: 2024-03-09 | End: 2024-03-10 | Stop reason: HOSPADM

## 2024-03-09 RX ORDER — ROPIVACAINE HYDROCHLORIDE 2 MG/ML
INJECTION, SOLUTION EPIDURAL; INFILTRATION; PERINEURAL
Status: ACTIVE
Start: 2024-03-09 | End: 2024-03-10

## 2024-03-09 RX ORDER — METHYLERGONOVINE MALEATE 0.2 MG/ML
0.2 INJECTION INTRAVENOUS
Status: DISCONTINUED | OUTPATIENT
Start: 2024-03-09 | End: 2024-03-10 | Stop reason: HOSPADM

## 2024-03-09 RX ORDER — ACETAMINOPHEN 500 MG
1000 TABLET ORAL
Status: DISCONTINUED | OUTPATIENT
Start: 2024-03-09 | End: 2024-03-10 | Stop reason: HOSPADM

## 2024-03-09 RX ORDER — ONDANSETRON 4 MG/1
4 TABLET, ORALLY DISINTEGRATING ORAL EVERY 6 HOURS PRN
Status: DISCONTINUED | OUTPATIENT
Start: 2024-03-09 | End: 2024-03-10 | Stop reason: HOSPADM

## 2024-03-09 RX ORDER — OXYTOCIN 10 [USP'U]/ML
10 INJECTION, SOLUTION INTRAMUSCULAR; INTRAVENOUS
Status: DISCONTINUED | OUTPATIENT
Start: 2024-03-09 | End: 2024-03-10 | Stop reason: HOSPADM

## 2024-03-09 RX ORDER — ONDANSETRON 2 MG/ML
4 INJECTION INTRAMUSCULAR; INTRAVENOUS EVERY 6 HOURS PRN
Status: DISCONTINUED | OUTPATIENT
Start: 2024-03-09 | End: 2024-03-10 | Stop reason: HOSPADM

## 2024-03-09 RX ADMIN — OXYTOCIN 2 MILLI-UNITS/MIN: 10 INJECTION, SOLUTION INTRAMUSCULAR; INTRAVENOUS at 22:10

## 2024-03-09 RX ADMIN — SODIUM CHLORIDE, POTASSIUM CHLORIDE, SODIUM LACTATE AND CALCIUM CHLORIDE 1000 ML: 600; 310; 30; 20 INJECTION, SOLUTION INTRAVENOUS at 22:09

## 2024-03-09 ASSESSMENT — LIFESTYLE VARIABLES
EVER HAD A DRINK FIRST THING IN THE MORNING TO STEADY YOUR NERVES TO GET RID OF A HANGOVER: NO
TOTAL SCORE: 0
TOTAL SCORE: 0
CONSUMPTION TOTAL: INCOMPLETE
TOTAL SCORE: 0
EVER FELT BAD OR GUILTY ABOUT YOUR DRINKING: NO
HAVE YOU EVER FELT YOU SHOULD CUT DOWN ON YOUR DRINKING: NO
HAVE PEOPLE ANNOYED YOU BY CRITICIZING YOUR DRINKING: NO
ALCOHOL_USE: NO
EVER_SMOKED: NEVER

## 2024-03-09 ASSESSMENT — FIBROSIS 4 INDEX: FIB4 SCORE: 0.48

## 2024-03-10 ENCOUNTER — ANESTHESIA (OUTPATIENT)
Dept: ANESTHESIOLOGY | Facility: MEDICAL CENTER | Age: 24
End: 2024-03-10
Payer: COMMERCIAL

## 2024-03-10 ENCOUNTER — ANESTHESIA EVENT (OUTPATIENT)
Dept: ANESTHESIOLOGY | Facility: MEDICAL CENTER | Age: 24
End: 2024-03-10
Payer: COMMERCIAL

## 2024-03-10 LAB
APPEARANCE UR: CLEAR
BACTERIA #/AREA URNS HPF: NEGATIVE /HPF
BILIRUB UR QL STRIP.AUTO: NEGATIVE
COLOR UR: YELLOW
EPI CELLS #/AREA URNS HPF: NEGATIVE /HPF
GLUCOSE UR STRIP.AUTO-MCNC: NEGATIVE MG/DL
HYALINE CASTS #/AREA URNS LPF: ABNORMAL /LPF
KETONES UR STRIP.AUTO-MCNC: NEGATIVE MG/DL
LEUKOCYTE ESTERASE UR QL STRIP.AUTO: ABNORMAL
MICRO URNS: ABNORMAL
NITRITE UR QL STRIP.AUTO: NEGATIVE
PH UR STRIP.AUTO: 8 [PH] (ref 5–8)
PROT UR QL STRIP: NEGATIVE MG/DL
RBC # URNS HPF: ABNORMAL /HPF
RBC UR QL AUTO: ABNORMAL
SP GR UR STRIP.AUTO: 1
UROBILINOGEN UR STRIP.AUTO-MCNC: 0.2 MG/DL
WBC #/AREA URNS HPF: ABNORMAL /HPF

## 2024-03-10 PROCEDURE — 700111 HCHG RX REV CODE 636 W/ 250 OVERRIDE (IP): Mod: JZ | Performed by: OBSTETRICS & GYNECOLOGY

## 2024-03-10 PROCEDURE — 700111 HCHG RX REV CODE 636 W/ 250 OVERRIDE (IP): Performed by: ANESTHESIOLOGY

## 2024-03-10 PROCEDURE — 59409 OBSTETRICAL CARE: CPT

## 2024-03-10 PROCEDURE — 303615 HCHG EPIDURAL/SPINAL ANESTHESIA FOR LABOR

## 2024-03-10 PROCEDURE — A9270 NON-COVERED ITEM OR SERVICE: HCPCS | Performed by: ADVANCED PRACTICE MIDWIFE

## 2024-03-10 PROCEDURE — 770002 HCHG ROOM/CARE - OB PRIVATE (112)

## 2024-03-10 PROCEDURE — 59400 OBSTETRICAL CARE: CPT | Performed by: OBSTETRICS & GYNECOLOGY

## 2024-03-10 PROCEDURE — 700111 HCHG RX REV CODE 636 W/ 250 OVERRIDE (IP): Mod: JZ | Performed by: ANESTHESIOLOGY

## 2024-03-10 PROCEDURE — 700101 HCHG RX REV CODE 250: Performed by: ANESTHESIOLOGY

## 2024-03-10 PROCEDURE — 700101 HCHG RX REV CODE 250: Mod: JW

## 2024-03-10 PROCEDURE — 304965 HCHG RECOVERY SERVICES

## 2024-03-10 PROCEDURE — 81001 URINALYSIS AUTO W/SCOPE: CPT

## 2024-03-10 PROCEDURE — 700102 HCHG RX REV CODE 250 W/ 637 OVERRIDE(OP): Performed by: ADVANCED PRACTICE MIDWIFE

## 2024-03-10 PROCEDURE — 700105 HCHG RX REV CODE 258: Performed by: OBSTETRICS & GYNECOLOGY

## 2024-03-10 PROCEDURE — 10907ZC DRAINAGE OF AMNIOTIC FLUID, THERAPEUTIC FROM PRODUCTS OF CONCEPTION, VIA NATURAL OR ARTIFICIAL OPENING: ICD-10-PCS | Performed by: ADVANCED PRACTICE MIDWIFE

## 2024-03-10 RX ORDER — EPHEDRINE SULFATE 50 MG/ML
INJECTION, SOLUTION INTRAVENOUS
Status: COMPLETED
Start: 2024-03-10 | End: 2024-03-10

## 2024-03-10 RX ORDER — ROPIVACAINE HYDROCHLORIDE 2 MG/ML
INJECTION, SOLUTION EPIDURAL; INFILTRATION; PERINEURAL CONTINUOUS
Status: DISCONTINUED | OUTPATIENT
Start: 2024-03-10 | End: 2024-03-11 | Stop reason: HOSPADM

## 2024-03-10 RX ORDER — SODIUM CHLORIDE, SODIUM LACTATE, POTASSIUM CHLORIDE, AND CALCIUM CHLORIDE .6; .31; .03; .02 G/100ML; G/100ML; G/100ML; G/100ML
250 INJECTION, SOLUTION INTRAVENOUS PRN
Status: DISCONTINUED | OUTPATIENT
Start: 2024-03-10 | End: 2024-03-10 | Stop reason: HOSPADM

## 2024-03-10 RX ORDER — DOCUSATE SODIUM 100 MG/1
100 CAPSULE, LIQUID FILLED ORAL 2 TIMES DAILY PRN
Status: DISCONTINUED | OUTPATIENT
Start: 2024-03-10 | End: 2024-03-11 | Stop reason: HOSPADM

## 2024-03-10 RX ORDER — IBUPROFEN 800 MG/1
800 TABLET ORAL EVERY 8 HOURS PRN
Status: DISCONTINUED | OUTPATIENT
Start: 2024-03-10 | End: 2024-03-11 | Stop reason: HOSPADM

## 2024-03-10 RX ORDER — SODIUM CHLORIDE, SODIUM LACTATE, POTASSIUM CHLORIDE, AND CALCIUM CHLORIDE .6; .31; .03; .02 G/100ML; G/100ML; G/100ML; G/100ML
1000 INJECTION, SOLUTION INTRAVENOUS
Status: DISCONTINUED | OUTPATIENT
Start: 2024-03-10 | End: 2024-03-10 | Stop reason: HOSPADM

## 2024-03-10 RX ORDER — ACETAMINOPHEN 500 MG
1000 TABLET ORAL EVERY 6 HOURS PRN
Status: DISCONTINUED | OUTPATIENT
Start: 2024-03-10 | End: 2024-03-11 | Stop reason: HOSPADM

## 2024-03-10 RX ORDER — BUPIVACAINE HYDROCHLORIDE 2.5 MG/ML
INJECTION, SOLUTION EPIDURAL; INFILTRATION; INTRACAUDAL
Status: COMPLETED | OUTPATIENT
Start: 2024-03-10 | End: 2024-03-10

## 2024-03-10 RX ORDER — SODIUM CHLORIDE, SODIUM LACTATE, POTASSIUM CHLORIDE, CALCIUM CHLORIDE 600; 310; 30; 20 MG/100ML; MG/100ML; MG/100ML; MG/100ML
INJECTION, SOLUTION INTRAVENOUS PRN
Status: DISCONTINUED | OUTPATIENT
Start: 2024-03-10 | End: 2024-03-11 | Stop reason: HOSPADM

## 2024-03-10 RX ORDER — BUPIVACAINE HYDROCHLORIDE 2.5 MG/ML
INJECTION, SOLUTION EPIDURAL; INFILTRATION; INTRACAUDAL
Status: COMPLETED
Start: 2024-03-10 | End: 2024-03-10

## 2024-03-10 RX ORDER — MISOPROSTOL 200 UG/1
600 TABLET ORAL
Status: DISCONTINUED | OUTPATIENT
Start: 2024-03-10 | End: 2024-03-11 | Stop reason: HOSPADM

## 2024-03-10 RX ORDER — LIDOCAINE HYDROCHLORIDE AND EPINEPHRINE 15; 5 MG/ML; UG/ML
INJECTION, SOLUTION EPIDURAL
Status: COMPLETED | OUTPATIENT
Start: 2024-03-10 | End: 2024-03-10

## 2024-03-10 RX ORDER — EPHEDRINE SULFATE 50 MG/ML
5 INJECTION, SOLUTION INTRAVENOUS
Status: DISCONTINUED | OUTPATIENT
Start: 2024-03-10 | End: 2024-03-10 | Stop reason: HOSPADM

## 2024-03-10 RX ORDER — VITAMIN A ACETATE, BETA CAROTENE, ASCORBIC ACID, CHOLECALCIFEROL, .ALPHA.-TOCOPHEROL ACETATE, DL-, THIAMINE MONONITRATE, RIBOFLAVIN, NIACINAMIDE, PYRIDOXINE HYDROCHLORIDE, FOLIC ACID, CYANOCOBALAMIN, CALCIUM CARBONATE, FERROUS FUMARATE, ZINC OXIDE, CUPRIC OXIDE 3080; 12; 120; 400; 1; 1.84; 3; 20; 22; 920; 25; 200; 27; 10; 2 [IU]/1; UG/1; MG/1; [IU]/1; MG/1; MG/1; MG/1; MG/1; MG/1; [IU]/1; MG/1; MG/1; MG/1; MG/1; MG/1
1 TABLET, FILM COATED ORAL EVERY MORNING
Status: DISCONTINUED | OUTPATIENT
Start: 2024-03-10 | End: 2024-03-11 | Stop reason: HOSPADM

## 2024-03-10 RX ADMIN — OXYTOCIN 20 UNITS: 10 INJECTION, SOLUTION INTRAMUSCULAR; INTRAVENOUS at 08:22

## 2024-03-10 RX ADMIN — FENTANYL CITRATE 100 MCG: 50 INJECTION, SOLUTION INTRAMUSCULAR; INTRAVENOUS at 01:49

## 2024-03-10 RX ADMIN — ACETAMINOPHEN 1000 MG: 500 TABLET, FILM COATED ORAL at 11:59

## 2024-03-10 RX ADMIN — EPHEDRINE SULFATE 5 MG: 50 INJECTION, SOLUTION INTRAVENOUS at 03:08

## 2024-03-10 RX ADMIN — SERTRALINE HYDROCHLORIDE 50 MG: 50 TABLET ORAL at 11:29

## 2024-03-10 RX ADMIN — BUPIVACAINE HYDROCHLORIDE 5 ML: 2.5 INJECTION, SOLUTION EPIDURAL; INFILTRATION; INTRACAUDAL at 01:49

## 2024-03-10 RX ADMIN — ACETAMINOPHEN 1000 MG: 500 TABLET, FILM COATED ORAL at 17:45

## 2024-03-10 RX ADMIN — IBUPROFEN 800 MG: 800 TABLET, FILM COATED ORAL at 11:59

## 2024-03-10 RX ADMIN — ROPIVACAINE HYDROCHLORIDE: 2 INJECTION, SOLUTION EPIDURAL; INFILTRATION at 03:10

## 2024-03-10 RX ADMIN — IBUPROFEN 800 MG: 800 TABLET, FILM COATED ORAL at 19:55

## 2024-03-10 RX ADMIN — DOCUSATE SODIUM 100 MG: 100 CAPSULE, LIQUID FILLED ORAL at 19:55

## 2024-03-10 RX ADMIN — LIDOCAINE HYDROCHLORIDE,EPINEPHRINE BITARTRATE 3 ML: 15; .005 INJECTION, SOLUTION EPIDURAL; INFILTRATION; INTRACAUDAL; PERINEURAL at 01:49

## 2024-03-10 RX ADMIN — OXYTOCIN 125 ML/HR: 10 INJECTION, SOLUTION INTRAMUSCULAR; INTRAVENOUS at 11:24

## 2024-03-10 RX ADMIN — ONDANSETRON 4 MG: 2 INJECTION INTRAMUSCULAR; INTRAVENOUS at 03:08

## 2024-03-10 ASSESSMENT — PAIN DESCRIPTION - PAIN TYPE
TYPE: ACUTE PAIN

## 2024-03-10 ASSESSMENT — PAIN SCALES - GENERAL: PAIN_LEVEL: 0

## 2024-03-10 NOTE — ANESTHESIA TIME REPORT
Anesthesia Start and Stop Event Times       Date Time Event    3/10/2024 0119 Ready for Procedure     0138 Anesthesia Start     0818 Anesthesia Stop          Responsible Staff  03/10/24      Name Role Begin End    Lennox Burton M.D. Anesth 0138 0700    Rod Gamez M.D. Anesth 0700 0818          Overtime Reason:  no overtime (within assigned shift)    Comments:

## 2024-03-10 NOTE — ANESTHESIA PROCEDURE NOTES
Epidural Block    Date/Time: 3/10/2024 1:49 AM    Performed by: Lennox Burton M.D.  Authorized by: Lennox Burton M.D.    Patient Location:  OB  Start Time:  3/10/2024 1:49 AM  Reason for Block: labor analgesia    patient identified, IV checked, site marked, risks and benefits discussed, surgical consent, monitors and equipment checked and pre-op evaluation    Patient Position:  Sitting  Prep: ChloraPrep, patient draped and sterile technique    Monitoring:  Blood pressure, continuous pulse oximetry and heart rate  Approach:  Midline  Location:  L3-L4  Injection Technique:  ALLIE saline  Skin infiltration:  Lidocaine  Strength:  1%  Dose:  3ml  Needle Type:  Tuohy  Needle Gauge:  17 G  Needle Length:  3.5 in  Loss of resistance::  6  Catheter Size:  19 G  Catheter at Skin Depth:  12  Test Dose Result:  Negative   Placed with one attempt.  Easy catheter placement with no paresthesia.  Tolerated very well.  Good block.

## 2024-03-10 NOTE — CARE PLAN
The patient is Watcher - Medium risk of patient condition declining or worsening.    Shift Goals  Clinical Goals: Cervical change  Patient Goals: Have a baby  Family Goals: Be supportive    Progress made toward(s) clinical / shift goals:  Yes, progressing.     Patient is not progressing towards the following goals:

## 2024-03-10 NOTE — PROGRESS NOTES
0430 - Report received from THOR Yao  0605 - CNM notified of SVE and pit at 14mu/min.   0655 - Report given to THOR Quintanilla

## 2024-03-10 NOTE — H&P
Admission History and Physical      Caitlyn Diamond is a 23 y.o. female  at 39w1d who presents for elective IOL at term.    Subjective:   positive - contractions have been intermittent over past day  negative Feels pain   negative for LOF  negative for vaginal bleeding.   positive for fetal movement    She reports she most desires IOL related to pelvic pain and prolapse of vaginal tissue. Started on zoloft two days ago.     ROS: Pertinent items are noted in HPI.    Past Medical History:   Diagnosis Date    Depression 2020     Past Surgical History:   Procedure Laterality Date    LIPOSUCTION      MAMMOPLASTY AUGMENTATION      OTHER      Nose surgery     OB History    Para Term  AB Living   7 2 2   4 2   SAB IAB Ectopic Molar Multiple Live Births   1 3       2      # Outcome Date GA Lbr Ranjeet/2nd Weight Sex Delivery Anes PTL Lv   7 Current            6 Term 20 38w3d 01:50 / 00:33 3.62 kg (7 lb 15.7 oz) M Vag-Spont EPI  WGENDOLYN   5 IAB 2019           4 IAB 2018           3 Term 17 40w0d  3.629 kg (8 lb) M Vag-Spont EPI  GWENDOLYN   2 SAB            1 IAB              Social History     Socioeconomic History    Marital status: Single     Spouse name: Not on file    Number of children: Not on file    Years of education: Not on file    Highest education level: Not on file   Occupational History    Not on file   Tobacco Use    Smoking status: Never    Smokeless tobacco: Never   Vaping Use    Vaping Use: Never used   Substance and Sexual Activity    Alcohol use: Yes     Comment: social    Drug use: Not Currently     Types: Inhaled, Marijuana     Comment: Marijuana in past 2 years    Sexual activity: Yes     Partners: Male     Comment: Planned pregnancy, not  but baby is welcomed. FOB  is iinvolved and  supportive.   Other Topics Concern    Not on file   Social History Narrative    Not on file     Social Determinants of Health     Financial Resource Strain: Not on file   Food  Insecurity: Unknown (8/11/2020)    Hunger Vital Sign     Worried About Running Out of Food in the Last Year: Patient declined     Ran Out of Food in the Last Year: Patient declined   Transportation Needs: Unknown (8/11/2020)    PRAPARE - Transportation     Lack of Transportation (Medical): Patient declined     Lack of Transportation (Non-Medical): Patient declined   Physical Activity: Not on file   Stress: Not on file   Social Connections: Not on file   Intimate Partner Violence: Not on file   Housing Stability: Not on file     Allergies: Pcn [penicillins]    Current Facility-Administered Medications:     LR (Bolus) infusion 1,000 mL, 1,000 mL, Intravenous, Once PRN, Lennox Burton M.D.    ropivacaine 0.2 % (Naropin) injection, , Epidural, Continuous, Lennox Burton M.D., New Bag at 03/10/24 0310    ePHEDrine injection 5 mg, 5 mg, Intravenous, Q5 MIN PRN **AND** Notify Anesthesiologist if ephedrine given and for sustained hypotension (more than 2 minutes), , , Once **AND** For Hypotension: Place patient in left lateral tilt to achieve uterine displacement and elevate legs., , , PRN **AND** Hypotension: Oxygen Continuous, , , CONTINUOUS **AND** LR (Bolus) infusion 250 mL, 250 mL, Intravenous, PRN, Lennox Burton M.D.    EPHEDRINE SULFATE (PRESSORS) 50 MG/ML IV SOLN (WRAPPER), , , ,     lidocaine (XYLOCAINE) 1%  injection, 20 mL, Subcutaneous, Once PRN, Aziza Pond M.D.    terbutaline (Brethine) injection 0.25 mg, 0.25 mg, Subcutaneous, Once PRN, Aziza Pond M.D.    oxytocin (Pitocin) infusion bolus (for post delivery), 20 Units, Intravenous, Once **FOLLOWED BY** oxytocin (Pitocin) infusion (for post delivery), 125 mL/hr, Intravenous, Continuous, Aziza Pond M.D.    oxytocin (Pitocin) injection 10 Units, 10 Units, Intramuscular, Once PRN, Aziza Pond M.D.    ibuprofen (Motrin) tablet 800 mg, 800 mg, Oral, Once PRN, Aziza F. Salty, M.D.    acetaminophen (Tylenol) tablet 1,000 mg, 1,000 mg, Oral, Once  PRN, Aziza Pond M.D.    lactated ringers infusion, 1,000 mL, Intravenous, Continuous, Last Rate: 125 mL/hr at 03/10/24 0342, Rate Verify at 03/10/24 0342 **FOLLOWED BY** D5LR infusion, , Intravenous, Continuous, Aziza Pond M.D.    fentaNYL (Sublimaze) injection 50 mcg, 50 mcg, Intravenous, Q HOUR PRN **OR** fentaNYL (Sublimaze) injection 100 mcg, 100 mcg, Intravenous, Q HOUR PRN, Aziza Pond M.D.    oxytocin (Pitocin) infusion (for induction), 0.5-20 kayli-units/min, Intravenous, Continuous, Aziza Pond M.D., Last Rate: 24 mL/hr at 03/10/24 0342, 8 kayli-units/min at 03/10/24 0342    ondansetron (Zofran ODT) dispertab 4 mg, 4 mg, Oral, Q6HRS PRN **OR** ondansetron (Zofran) syringe/vial injection 4 mg, 4 mg, Intravenous, Q6HRS PRN, Aziza Pond M.D.    miSOPROStol (Cytotec) tablet 800 mcg, 800 mcg, Rectal, Once PRN, Aziza Pond M.D.    methylergonovine (Methergine) injection 0.2 mg, 0.2 mg, Intramuscular, Once PRN, Aziza Pond M.D.    ROPIVACAINE HCL 2 MG/ML INJ SOLN, , , ,     Prenatal care with [facility] starting at 12 with following problems:  Patient Active Problem List    Diagnosis Date Noted    History of pyelonephritis during pregnancy 11/10/2023    Encounter for supervision of other normal pregnancy, second trimester 06/17/2020    Dysthymic disorder 04/23/2015       Last US at 29 weeks    12/19/2023 11:55 AM     HISTORY/REASON FOR EXAM:  Clear placenta and fetal bladder, history of low lying placenta, fetal bladder not seen.     TECHNIQUE/EXAM DESCRIPTION: OB limited ultrasound.     COMPARISON:  10/30/2023 OB complete ultrasound     FINDINGS:  Fetal Lie:  Transverse  LMP:  6/10/2023  Clinical HILTON by LMP:  3/16/2024     Placenta (Location):  Posterior  Placenta Previa: No  Placental ndGndrndanddndend:nd nd2nd Amniotic Fluid Volume:  LAM = 16.1 cm     Fetal Heart Rate:  155 bpm     Cervical Length:  4.2 cm     No maternal adnexal mass is identified.     Comments:     IMPRESSION:     Single live intrauterine  "pregnancy is identified. Fetal urinary bladder is visualized with no abnormalities. No evidence of low-lying placenta on the current exam.     No fetal anatomic abnormalities are noted on this limited examination.         Objective:      BP 91/44   Pulse 67   Temp 36.2 °C (97.1 °F) (Temporal)   Ht 1.626 m (5' 4\")   Wt 83.9 kg (185 lb)   SpO2 96%     General:   no acute distress, alert, cooperative   Skin:   normal   HEENT:  PERRLA   Lungs:   CTA bilateral   Heart:   S1, S2 normal, no murmur, click, rub or gallop, regular rate and rhythm, peripheral pulses very brisk, chest is clear without rales or wheezing, no pedal edema, no JVD, no hepatosplenomegaly   Abdomen:   gravid, NT   EFW:  3200g   Pelvis:  adequate, diagnonal conjugate not met   FHT:  145 BPM   Uterine Size: S=D   Presentations: Cephalic   Cervix:  Per RN    Dilation: 3cm    Effacement: 60    Station:  -3    Consistency: Soft    Position: Middle     Lab Review  Lab:   Blood type: O     Recent Results (from the past 5880 hour(s))   POCT Pregnancy    Collection Time: 07/31/23 12:48 PM   Result Value Ref Range    POC Urine Pregnancy Test Positive     Internal Control Positive Positive     Internal Control Negative Positive    VAGINAL PATHOGENS DNA PANEL    Collection Time: 09/08/23 12:10 PM   Result Value Ref Range    Candida species DNA Probe Negative Negative    Trichamonas vaginalis DNA Probe Negative Negative    Gardnerella vaginalis DNA Probe Negative Negative   PRENATAL PANEL 3    Collection Time: 09/19/23 11:58 AM   Result Value Ref Range    WBC 7.9 4.8 - 10.8 K/uL    RBC 4.53 4.20 - 5.40 M/uL    Hemoglobin 13.4 12.0 - 16.0 g/dL    Hematocrit 40.6 37.0 - 47.0 %    MCV 89.6 81.4 - 97.8 fL    MCH 29.6 27.0 - 33.0 pg    MCHC 33.0 32.2 - 35.5 g/dL    RDW 42.9 35.9 - 50.0 fL    Platelet Count 163 (L) 164 - 446 K/uL    MPV 11.5 9.0 - 12.9 fL    Neutrophils-Polys 75.10 (H) 44.00 - 72.00 %    Lymphocytes 16.40 (L) 22.00 - 41.00 %    Monocytes 6.30 0.00 " - 13.40 %    Eosinophils 1.40 0.00 - 6.90 %    Basophils 0.50 0.00 - 1.80 %    Immature Granulocytes 0.30 0.00 - 0.90 %    Nucleated RBC 0.00 0.00 - 0.20 /100 WBC    Neutrophils (Absolute) 5.96 1.82 - 7.42 K/uL    Lymphs (Absolute) 1.30 1.00 - 4.80 K/uL    Monos (Absolute) 0.50 0.00 - 0.85 K/uL    Eos (Absolute) 0.11 0.00 - 0.51 K/uL    Baso (Absolute) 0.04 0.00 - 0.12 K/uL    Immature Granulocytes (abs) 0.02 0.00 - 0.11 K/uL    NRBC (Absolute) 0.00 K/uL    Rubella IgG Antibody 136.00 IU/mL    Hepatitis B Surface Antigen Non-Reactive Non-Reactive    Syphilis, Treponemal Qual Non-Reactive Non-Reactive   URINALYSIS    Collection Time: 09/19/23 11:58 AM   Result Value Ref Range    Color Yellow     Character Clear     Specific Gravity 1.025 <1.035    Ph 6.5 5.0 - 8.0    Glucose Negative Negative mg/dL    Ketones Negative Negative mg/dL    Protein Negative Negative mg/dL    Bilirubin Negative Negative    Urobilinogen, Urine 0.2 Negative    Nitrite Negative Negative    Leukocyte Esterase Moderate (A) Negative    Occult Blood Negative Negative    Micro Urine Req Microscopic    URINE CULTURE(NEW)    Collection Time: 09/19/23 11:58 AM    Specimen: Urine   Result Value Ref Range    Significant Indicator NEG     Source UR     Site -     Culture Result Usual urogenital rocky >100,000 cfu/mL    URINE MICROSCOPIC (W/UA)    Collection Time: 09/19/23 11:58 AM   Result Value Ref Range    WBC 2-5 /hpf    RBC 0-2 /hpf    Bacteria Few (A) None /hpf    Epithelial Cells Few /hpf    Hyaline Cast 3-5 (A) /lpf   HIV AG/AB COMBO ASSAY SCREENING    Collection Time: 09/19/23 11:58 AM   Result Value Ref Range    HIV Ag/Ab Combo Assay Non-Reactive Non Reactive   HEP C VIRUS ANTIBODY    Collection Time: 09/19/23 11:58 AM   Result Value Ref Range    Hepatitis C Antibody Non-Reactive Non-Reactive   Chlamydia/GC, PCR (Urine)    Collection Time: 09/19/23 11:58 AM   Result Value Ref Range    C. trachomatis by PCR Negative Negative    Gc By Dna Probe  Negative Negative    Source Urine    OP Prenatal Panel-Blood Bank    Collection Time: 09/19/23 11:58 AM   Result Value Ref Range    ABO Grouping Only O     Rh Grouping Only POS     Antibody Screen Scrn NEG    CBC WITH DIFFERENTIAL    Collection Time: 10/20/23  6:55 PM   Result Value Ref Range    WBC 12.4 (H) 4.8 - 10.8 K/uL    RBC 4.32 4.20 - 5.40 M/uL    Hemoglobin 13.1 12.0 - 16.0 g/dL    Hematocrit 39.2 37.0 - 47.0 %    MCV 90.7 81.4 - 97.8 fL    MCH 30.3 27.0 - 33.0 pg    MCHC 33.4 32.2 - 35.5 g/dL    RDW 42.7 35.9 - 50.0 fL    Platelet Count 212 164 - 446 K/uL    MPV 10.9 9.0 - 12.9 fL    Neutrophils-Polys 77.40 (H) 44.00 - 72.00 %    Lymphocytes 17.20 (L) 22.00 - 41.00 %    Monocytes 3.60 0.00 - 13.40 %    Eosinophils 1.30 0.00 - 6.90 %    Basophils 0.20 0.00 - 1.80 %    Immature Granulocytes 0.30 0.00 - 0.90 %    Nucleated RBC 0.00 0.00 - 0.20 /100 WBC    Neutrophils (Absolute) 9.59 (H) 1.82 - 7.42 K/uL    Lymphs (Absolute) 2.13 1.00 - 4.80 K/uL    Monos (Absolute) 0.44 0.00 - 0.85 K/uL    Eos (Absolute) 0.16 0.00 - 0.51 K/uL    Baso (Absolute) 0.02 0.00 - 0.12 K/uL    Immature Granulocytes (abs) 0.04 0.00 - 0.11 K/uL    NRBC (Absolute) 0.00 K/uL   COMP METABOLIC PANEL    Collection Time: 10/20/23  6:55 PM   Result Value Ref Range    Sodium 136 135 - 145 mmol/L    Potassium 3.4 (L) 3.6 - 5.5 mmol/L    Chloride 103 96 - 112 mmol/L    Co2 23 20 - 33 mmol/L    Anion Gap 10.0 7.0 - 16.0    Glucose 128 (H) 65 - 99 mg/dL    Bun 7 (L) 8 - 22 mg/dL    Creatinine 0.58 0.50 - 1.40 mg/dL    Calcium 8.6 8.5 - 10.5 mg/dL    Correct Calcium 9.1 8.5 - 10.5 mg/dL    AST(SGOT) 12 12 - 45 U/L    ALT(SGPT) 8 2 - 50 U/L    Alkaline Phosphatase 57 30 - 99 U/L    Total Bilirubin 0.2 0.1 - 1.5 mg/dL    Albumin 3.4 3.2 - 4.9 g/dL    Total Protein 6.2 6.0 - 8.2 g/dL    Globulin 2.8 1.9 - 3.5 g/dL    A-G Ratio 1.2 g/dL   LIPASE    Collection Time: 10/20/23  6:55 PM   Result Value Ref Range    Lipase 38 11 - 82 U/L   HCG QUANTITATIVE     Collection Time: 10/20/23  6:55 PM   Result Value Ref Range    Bhcg 49604.0 (H) 0.0 - 5.0 mIU/mL   RH Type for Rhogam from E.D.    Collection Time: 10/20/23  6:55 PM   Result Value Ref Range    Emergency Department Rh Typing POS     Number Of Rh Doses Indicated ZERO    ESTIMATED GFR    Collection Time: 10/20/23  6:55 PM   Result Value Ref Range    GFR (CKD-EPI) 129 >60 mL/min/1.73 m 2   POCT Urinalysis    Collection Time: 11/10/23 12:30 PM   Result Value Ref Range    POC Color dark yellow Negative    POC Appearance slightly cloudy Negative    POC Glucose neg Negative mg/dL    POC Bilirubin neg Negative mg/dL    POC Ketones neg Negative mg/dL    POC Specific Gravity 1.025 <1.005 - >1.030    POC Blood neg Negative    POC Urine PH 7.5 5.0 - 8.0    POC Protein 30 mg Negative mg/dL    POC Urobiligen 0.2 dl Negative (0.2) mg/dL    POC Nitrites neg Negative    POC Leukocyte Esterase small Negative   URINE CULTURE(NEW)    Collection Time: 11/10/23 12:36 PM    Specimen: Urine   Result Value Ref Range    Significant Indicator NEG     Source UR     Site -     Culture Result Usual urogenital rocky >100,000 cfu/mL    Chlamydia/GC, PCR (Genital/Anal swab)    Collection Time: 12/10/23  1:28 PM    Specimen: Genital   Result Value Ref Range    C. trachomatis by PCR Negative Negative    N. gonorrhoeae by PCR Negative Negative    Source Genital    VAGINAL PATHOGENS DNA PANEL    Collection Time: 12/10/23  1:28 PM   Result Value Ref Range    Candida species DNA Probe Negative Negative    Trichamonas vaginalis DNA Probe Negative Negative    Gardnerella vaginalis DNA Probe Negative Negative   POCT Urinalysis    Collection Time: 12/10/23  2:12 PM   Result Value Ref Range    POC Color Yellow Negative    POC Appearance Cloudy Negative    POC Glucose Neg Negative mg/dL    POC Bilirubin Neg Negative mg/dL    POC Ketones Neg Negative mg/dL    POC Specific Gravity 1.020 <1.005 - >1.030    POC Blood Neg Negative    POC Urine PH 8.5 (A) 5.0 -  8.0    POC Protein Neg Negative mg/dL    POC Urobiligen 0.2 Negative (0.2) mg/dL    POC Nitrites Neg Negative    POC Leukocyte Esterase Neg Negative   POCT Pregnancy    Collection Time: 12/10/23  2:14 PM   Result Value Ref Range    POC Urine Pregnancy Test Positive     Internal Control Positive Negative     Internal Control Negative Negative    Chlamydia/GC, PCR (Genital/Anal swab)    Collection Time: 01/09/24  2:45 PM    Specimen: Genital   Result Value Ref Range    C. trachomatis by PCR Negative Negative    N. gonorrhoeae by PCR Negative Negative    Source Vaginal    POCT urinalysis device results    Collection Time: 01/09/24  2:45 PM   Result Value Ref Range    POC Color Yellow     POC Appearance Clear     POC Glucose Negative Negative mg/dL    POC Ketones Negative Negative mg/dL    POC Specific Gravity 1.010 1.005 - 1.030    POC Blood Negative Negative    POC Urine PH 7.0 5.0 - 8.0    POC Protein Negative Negative mg/dL    POC Nitrites Negative Negative    POC Leukocyte Esterase Negative Negative   REFERENCE MISC.REFRIG 1    Collection Time: 01/25/24 12:30 AM   Result Value Ref Range    Miscellaneous Lab Result SEE NOTE (A)    VAGINAL PATHOGENS DNA PANEL    Collection Time: 01/25/24 12:49 PM   Result Value Ref Range    Candida species DNA Probe POSITIVE (A) Negative    Trichamonas vaginalis DNA Probe Negative Negative    Gardnerella vaginalis DNA Probe Negative Negative   AMBIGUOUS DATE/TIME    Collection Time: 01/25/24  5:35 PM   Result Value Ref Range    Ambiguous Date/Time See Below:    GRP B STREP, BY PCR ([facility])    Collection Time: 02/16/24  3:05 PM    Specimen: Genital   Result Value Ref Range    Strep Gp B DNA PCR Negative Negative   Hold Blood Bank Specimen (Not Tested)    Collection Time: 03/09/24  9:20 PM   Result Value Ref Range    Holding Tube - Bb DONE    CBC with differential    Collection Time: 03/09/24  9:20 PM   Result Value Ref Range    WBC 9.2 4.8 - 10.8 K/uL    RBC 4.16 (L) 4.20 - 5.40  M/uL    Hemoglobin 11.7 (L) 12.0 - 16.0 g/dL    Hematocrit 35.3 (L) 37.0 - 47.0 %    MCV 84.9 81.4 - 97.8 fL    MCH 28.1 27.0 - 33.0 pg    MCHC 33.1 32.2 - 35.5 g/dL    RDW 44.7 35.9 - 50.0 fL    Platelet Count 226 164 - 446 K/uL    MPV 10.6 9.0 - 12.9 fL    Neutrophils-Polys 66.30 44.00 - 72.00 %    Lymphocytes 24.50 22.00 - 41.00 %    Monocytes 7.40 0.00 - 13.40 %    Eosinophils 1.30 0.00 - 6.90 %    Basophils 0.40 0.00 - 1.80 %    Immature Granulocytes 0.10 0.00 - 0.90 %    Nucleated RBC 0.00 0.00 - 0.20 /100 WBC    Neutrophils (Absolute) 6.07 1.82 - 7.42 K/uL    Lymphs (Absolute) 2.24 1.00 - 4.80 K/uL    Monos (Absolute) 0.68 0.00 - 0.85 K/uL    Eos (Absolute) 0.12 0.00 - 0.51 K/uL    Baso (Absolute) 0.04 0.00 - 0.12 K/uL    Immature Granulocytes (abs) 0.01 0.00 - 0.11 K/uL    NRBC (Absolute) 0.00 K/uL   T.PALLIDUM AB DEYA (Syphilis)    Collection Time: 03/09/24  9:20 PM   Result Value Ref Range    Syphilis, Treponemal Qual Non-Reactive Non-Reactive          Assessment:   Caitlyn Diamond at 39w1d  Labor status: Not in labor.  Obstetrical history significant for   Patient Active Problem List    Diagnosis Date Noted    History of pyelonephritis during pregnancy 11/10/2023    Encounter for supervision of other normal pregnancy, second trimester 06/17/2020    Dysthymic disorder 04/23/2015   .      Plan:     1. Admit to L&D  2. GBS negative   3. Desires epidural for pain relief. May have when desired   4. Plan at this time is pitocin. Consider AROM for augmentation if appropriate.   5. Discontinue keflex.   6. Order zoloft which was started two days ago for patient history of depression with postpartum onset.       CFcarly NORMAN/ Dr. Pond Attending

## 2024-03-10 NOTE — PROGRESS NOTES
0655 Report received from THOR Armijo. POC discussed. Assuming care. Pt resting comfortably with epidural, no needs at this time.     0818  of a viable male infant. APGARS 8/9.    1115 Pt up to bathroom, tolerated well. Unable to void. Pt transferred to Postpartum in stable condition via wheelchair with baby in arms. Report given to THOR Resendiz. Baby bands verified x2 with two RN.

## 2024-03-10 NOTE — L&D DELIVERY NOTE
Admit Date:   2024      Pregnancy Complications: none  Medical Induction of Labor: no  GBS: negative  Anesthesia: epidural  Gestational Age at delivery: 39.1 weeks    Patient  progressed well with pitocin and AROM to deliver viable male infant in ABHAY position at 0818 with coached pushing efforts. Infant placed to maternal abdomen where he was dried and stimulated. A spontaneous cry was noted. Apgar 8, 9      Pitocin infused via IV bolus. After delayed cord clamping, cord was doubly clamped and cut by patient. Signs of placenta separation noted and gentle cord traction was completed. Intact placenta was delivered spontaneously at 0822 where 3VC was noted. EBL 200ml. Fundus firm with minimal massage. Inspection of vulva and vagina was completed and bilateral labial abrasions were noted. All abrasions were completed with interrupted suture per patient request related to gaping nature. Routine postpartum care was initiated as mother and infant stable. Infant weight is pending        Manolo SOUTH CNM/ Dr. Garcia, Attending

## 2024-03-10 NOTE — ANESTHESIA PREPROCEDURE EVALUATION
"Date: 03/10/24  Procedure: Labor Epidural       Past Medical History:   Diagnosis Date    Depression 6/2/2020       Past Surgical History:   Procedure Laterality Date    LIPOSUCTION      MAMMOPLASTY AUGMENTATION      OTHER      Nose surgery       Current Outpatient Medications   Medication Instructions    cephALEXin (KEFLEX) 250 mg, Oral, DAILY    clindamycin-benzoyl peroxide (BENZACLIN) gel 1 Application, Topical, 2 TIMES DAILY, APPLY TO AFFECTED AREA TWICE A DAY    fluconazole (DIFLUCAN) 150 MG tablet Take one tab PO today, repeat in 3 days    Misc. Devices (BREAST PUMP) Misc For breastfeeding difficulty or milk storage    Prenatal Vit-Fe Fumarate-FA (PRENATAL 1+1 PO) Oral    sertraline (ZOLOFT) 50 mg, Oral, DAILY       Temp 36.2 °C (97.1 °F) (Temporal)   Ht 1.626 m (5' 4\")   Wt 83.9 kg (185 lb)     Allergies   Allergen Reactions    Pcn [Penicillins] Rash     Rash       Recent Labs     09/19/23  1158 10/20/23  1855 03/09/24  2120   ABOGROUP O  --   --    ABSCRN NEG  --   --    HEMOGLOBIN 13.4   < > 11.7*   PLATELETCT 163*   < > 226   RUBELLAIGG 136.00  --   --    HEPBSAG Non-Reactive  --   --    HEPCAB Non-Reactive  --   --    GCBYDNAPR Negative  --   --     < > = values in this interval not displayed.       Relevant Problems   No relevant active problems       Physical Exam    Airway   Mallampati: II  TM distance: >3 FB  Neck ROM: full       Cardiovascular - normal exam  Rhythm: regular  Rate: normal  (-) murmur     Dental - normal exam           Pulmonary - normal exam  Breath sounds clear to auscultation     Abdominal    Neurological - normal exam                   Anesthesia Plan    ASA 2       Plan - epidural   Neuraxial block will be labor analgesia                  Pertinent diagnostic labs and testing reviewed    Informed Consent:    Anesthetic plan and risks discussed with patient.      Anesthetic labor epidural procedure and risks discussed in detail. Risks include, but are not limited failed epidural " with need to replace, PDPH, infection, bleeding, and/or nerve damage. Patient indicates full understanding and given full opportunity for questions. Agrees to proceed as planned and discussed.

## 2024-03-10 NOTE — ANESTHESIA POSTPROCEDURE EVALUATION
Patient: Caitlyn Diamond    Procedure Summary       Date: 03/10/24 Room / Location:     Anesthesia Start: 0138 Anesthesia Stop: 0818    Procedure: Labor Epidural Diagnosis:     Scheduled Providers:  Responsible Provider: Rod Gamez M.D.    Anesthesia Type: epidural ASA Status: 2            Final Anesthesia Type: epidural  Last vitals  BP   Blood Pressure: 115/58    Temp   36.1 °C (97 °F)    Pulse   (!) 57   Resp   16    SpO2   94 %      Anesthesia Post Evaluation    Patient location during evaluation: PACU  Patient participation: complete - patient participated  Level of consciousness: awake and alert  Pain score: 0    Airway patency: patent  Anesthetic complications: no  Cardiovascular status: hemodynamically stable  Respiratory status: acceptable  Hydration status: euvolemic    PONV: none          No notable events documented.

## 2024-03-10 NOTE — PROGRESS NOTES
2100: Late entry secondary to pt care.  Pt is 2100 IOL. Oriented to unit and call light system. EFM and TOCO applied. Discussed POC.   0120: Pt requesting epidural. Bolus started.   0145: Dr Burton called and to beside.   0430: Report given to Zofia MCRAE.

## 2024-03-10 NOTE — CARE PLAN
The patient is Stable - Low risk of patient condition declining or worsening    Shift Goals  Clinical Goals: VSS, light lochia  Patient Goals: pain control, rest  Family Goals: support    Progress made toward(s) clinical / shift goals:    Problem: Knowledge Deficit - Postpartum  Goal: Patient will verbalize and demonstrate understanding of self and infant care  Outcome: Progressing     Problem: Infection - Postpartum  Goal: Postpartum patient will be free of signs and symptoms of infection  Outcome: Progressing       Patient is not progressing towards the following goals:

## 2024-03-10 NOTE — LACTATION NOTE
Initial visit  MOB is P3.  this morning @ 0818; 39+1  Baby boy  Birth wt:7#5.1o/3320gm  MOB reports baby has  a few times since delivery. She reports he is now in nursery.  She denies pain with suckling. She reports she  her other children with plentiful milk supply and is currently leaking colostrum .  Reviewed skin to skin once baby is rooming in, feeding on cue without time limits, and normal  feeding frequency of first 24 hours. Encouraged to call for latch assessment with next feeding.

## 2024-03-10 NOTE — PROGRESS NOTES
1130 Pt arrived to room 325 from L&D via wheelchair transport. Pt transferred to bed. Bedside report received from Socorro MCRAE. Pt and SO oriented to room and unit, pain management options, dat care, POC and safety precautions. Siderails up x2, bed level down, call light within reach, no further questions or concerns at this time.  Infant placed into open crib, ID bands verified, cuddles tag observed. Parents oriented to room, unit, feeding schedule, diapering, infant safety and security. Questions answered and pt verbalizes understanding.    Pt stated she needed to use the restroom. Ambulated pt to the restroom. Pt complaining of cramping, burning pain and pressure in her vagina while sitting and walking to the restroom. Pt voided but only a small amount. Pt frequently (approximately every 15-30 minutes) needed to use the restroom. Pts pain getting worse. Tylenol, motrin, heat pack for cramps, dat ice pack, dermaplast, whitch jarod pads were given. Pt fundus firm U-1 and midline. Pt expressed concerns in relation to a UTI and vaginal prolapse (she had vaginal prolapse during pregnancy). This RN and Najma RN checked her and assured her she did not have any prolapse. Pt refused her last bag of pitocin, stated it is making her cramps unbearable. Updated Dr. Nance on pts situation. Orders for UA at this time. Obtained a bladder scanner and Najma MCRAE scanned her for 815 mL despite her constantly voiding. We recommended a straight cath at this time. Pt refused and stated that she wants to keep trying to void. Pts voiding began increasing to 100-300 mL every time she attempts and states she is starting to feel better but is still in pain. Reached out to Dr. Nance again, updated him on pt status.1445 Dr. Nance at bedside.      1600 Bladder scanned pt again. 273 on bladder scanner. Pt refuses straight cath and would like to continue working on voiding independently. Dr. aNnce updated. No new orders at this time    1700  "Pt states she is feeling much better and continuing to void \"a lot\". Pt does not write on clipboard to keep track of newborns I's and O's. Pt self reports feeding baby on demand (every 2ish hours) and lets baby breastfeed for 15-30 minutes on each side. This RN has witnessed this pts statement.   "

## 2024-03-10 NOTE — PROGRESS NOTES
"S: Pt is laying in bed s/p epidural placement. Reports pain is improving but concerned she can still move her legs. Family at bedside.      O:    Vitals:    24 2100 24 2123 03/10/24 0000 03/10/24 0300   BP: 130/61  125/66 91/44   Pulse: 81  70 67   Temp:  36.2 °C (97.1 °F)     TempSrc:  Temporal     SpO2:   96% 96%   Weight:  83.9 kg (185 lb)     Height:  1.626 m (5' 4\")             FHTs:  Baseline 130, pos accels, intermittent variable decels, moderate variability        [place]: Contractions q 3-5 minutes, mild to palpation, pitocin @ 6 milliunits        SVE: 4/60/-2 AROM with small amount of clear fluid returned.     A/P:    1.  IUP @ 39w1d   2.  Cat II FHTs    3.  Early Labor - continue pitocin titration per protocol. Anticipate .     AKOSUA Rashid, ESTER    "

## 2024-03-11 ENCOUNTER — PHARMACY VISIT (OUTPATIENT)
Dept: PHARMACY | Facility: MEDICAL CENTER | Age: 24
End: 2024-03-11
Payer: COMMERCIAL

## 2024-03-11 VITALS
RESPIRATION RATE: 18 BRPM | HEIGHT: 64 IN | SYSTOLIC BLOOD PRESSURE: 131 MMHG | WEIGHT: 185 LBS | TEMPERATURE: 98.1 F | OXYGEN SATURATION: 97 % | HEART RATE: 61 BPM | DIASTOLIC BLOOD PRESSURE: 60 MMHG | BODY MASS INDEX: 31.58 KG/M2

## 2024-03-11 LAB
ERYTHROCYTE [DISTWIDTH] IN BLOOD BY AUTOMATED COUNT: 45.6 FL (ref 35.9–50)
HCT VFR BLD AUTO: 35.3 % (ref 37–47)
HGB BLD-MCNC: 11.7 G/DL (ref 12–16)
MCH RBC QN AUTO: 28.5 PG (ref 27–33)
MCHC RBC AUTO-ENTMCNC: 33.1 G/DL (ref 32.2–35.5)
MCV RBC AUTO: 86.1 FL (ref 81.4–97.8)
PLATELET # BLD AUTO: 206 K/UL (ref 164–446)
PMV BLD AUTO: 11 FL (ref 9–12.9)
RBC # BLD AUTO: 4.1 M/UL (ref 4.2–5.4)
WBC # BLD AUTO: 11.7 K/UL (ref 4.8–10.8)

## 2024-03-11 PROCEDURE — RXMED WILLOW AMBULATORY MEDICATION CHARGE: Performed by: STUDENT IN AN ORGANIZED HEALTH CARE EDUCATION/TRAINING PROGRAM

## 2024-03-11 PROCEDURE — 36415 COLL VENOUS BLD VENIPUNCTURE: CPT

## 2024-03-11 PROCEDURE — 700102 HCHG RX REV CODE 250 W/ 637 OVERRIDE(OP): Performed by: ADVANCED PRACTICE MIDWIFE

## 2024-03-11 PROCEDURE — 85027 COMPLETE CBC AUTOMATED: CPT

## 2024-03-11 PROCEDURE — A9270 NON-COVERED ITEM OR SERVICE: HCPCS | Performed by: ADVANCED PRACTICE MIDWIFE

## 2024-03-11 RX ORDER — IBUPROFEN 600 MG/1
600 TABLET ORAL EVERY 8 HOURS PRN
Qty: 30 TABLET | Refills: 0 | Status: SHIPPED | OUTPATIENT
Start: 2024-03-11

## 2024-03-11 RX ORDER — ACETAMINOPHEN 500 MG
1000 TABLET ORAL EVERY 6 HOURS PRN
Qty: 30 TABLET | Refills: 0 | Status: SHIPPED | OUTPATIENT
Start: 2024-03-11

## 2024-03-11 RX ORDER — PSEUDOEPHEDRINE HCL 30 MG
100 TABLET ORAL 2 TIMES DAILY PRN
Qty: 60 CAPSULE | Refills: 0 | Status: SHIPPED | OUTPATIENT
Start: 2024-03-11

## 2024-03-11 RX ADMIN — ACETAMINOPHEN 1000 MG: 500 TABLET, FILM COATED ORAL at 05:57

## 2024-03-11 RX ADMIN — ACETAMINOPHEN 1000 MG: 500 TABLET, FILM COATED ORAL at 11:50

## 2024-03-11 RX ADMIN — ACETAMINOPHEN 1000 MG: 500 TABLET, FILM COATED ORAL at 00:03

## 2024-03-11 RX ADMIN — IBUPROFEN 800 MG: 800 TABLET, FILM COATED ORAL at 11:50

## 2024-03-11 RX ADMIN — IBUPROFEN 800 MG: 800 TABLET, FILM COATED ORAL at 04:03

## 2024-03-11 RX ADMIN — SERTRALINE HYDROCHLORIDE 50 MG: 50 TABLET ORAL at 05:58

## 2024-03-11 RX ADMIN — PRENATAL WITH FERROUS FUM AND FOLIC ACID 1 TABLET: 3080; 920; 120; 400; 22; 1.84; 3; 20; 10; 1; 12; 200; 27; 25; 2 TABLET ORAL at 05:57

## 2024-03-11 ASSESSMENT — EDINBURGH POSTNATAL DEPRESSION SCALE (EPDS)
I HAVE BEEN ANXIOUS OR WORRIED FOR NO GOOD REASON: NO, NOT AT ALL
I HAVE BEEN SO UNHAPPY THAT I HAVE HAD DIFFICULTY SLEEPING: NOT AT ALL
I HAVE BEEN ABLE TO LAUGH AND SEE THE FUNNY SIDE OF THINGS: AS MUCH AS I ALWAYS COULD
I HAVE LOOKED FORWARD WITH ENJOYMENT TO THINGS: AS MUCH AS I EVER DID
I HAVE BEEN SO UNHAPPY THAT I HAVE BEEN CRYING: ONLY OCCASIONALLY
I HAVE FELT SAD OR MISERABLE: NO, NOT AT ALL
THE THOUGHT OF HARMING MYSELF HAS OCCURRED TO ME: NEVER
I HAVE BLAMED MYSELF UNNECESSARILY WHEN THINGS WENT WRONG: NO, NEVER
THINGS HAVE BEEN GETTING ON TOP OF ME: NO, MOST OF THE TIME I HAVE COPED QUITE WELL
I HAVE FELT SCARED OR PANICKY FOR NO GOOD REASON: NO, NOT AT ALL

## 2024-03-11 ASSESSMENT — PAIN DESCRIPTION - PAIN TYPE
TYPE: ACUTE PAIN

## 2024-03-11 NOTE — PROGRESS NOTES
Bedside report received from THOR Resendiz. Pt in bed resting comfortably at this time. Pt able to get up to restroom and void independently, denies need for pain medication at this time. Pt states that she will call if additional pain medication is needed. Whiteboard updated. POC discussed. Call light within reach. All questions and concerns answered at this time, advised to call for assistance as needed.

## 2024-03-11 NOTE — DISCHARGE PLANNING
Discharge Planning Assessment Post Partum    Reason for Referral: History of depression   Address: [address] [place] [address] [place] [address]  Phone: [phone]  Type of Living Situation: stable housing   Mom Diagnosis: Pregnancy, vaginal delivery   Baby Diagnosis: Burt-39.1 weeks  Primary Language: English     Name of Baby: Cal Hill (: 3/10/24)  Father of the Baby: Edward Hill   Involved in baby’s care? Yes    Prenatal Care: Yes-[facility]  Mom's PCP: AKOSUA Conroy   PCP for new baby: [facility]     Support System: FO  Coping/Bonding between mother & baby: Yes  Source of Feeding: breast feeding   Supplies for Infant: prepared for infant; denies any needs    Mom's Insurance: [place]   Baby Covered on Insurance:Yes  Mother Employed/School: Yes  Other children in the home/names & ages: two boys; ages 6 and 3 years     Financial Hardship/Income: No   Mom's Mental status: alert and oriented   Services used prior to admit: None     CPS History: No  Psychiatric History: history of depression.  Discussed with mother who recently started taking Zoloft.  Provided PPD counseling and support group resources.  Domestic Violence History: No  Drug/ETOH History: No    Resources Provided: pediatrician list, children and family resource list, post partum support and counseling resources, and diaper bank assistance resources   Referrals Made: diaper bank referral provided      Clearance for Discharge: Infant is cleared to discharge home with parents once medically cleared

## 2024-03-11 NOTE — DISCHARGE INSTRUCTIONS
Pelvic rest x6 weeks  Return for follow up visit in 2 weeks due to h/o depression.  Call or come to ED for: heavy vaginal bleeding, fever >100.4, severe abdominal pain, severe headache, chest pain, shortness of breath,  N/V, or other concerns.  PATIENT DISCHARGE EDUCATION INSTRUCTION SHEET    REASONS TO CALL YOUR OBSTETRICIAN  Persistent fever, shaking, chills (Temperature higher than 100.4) may indicate you have an infection  Heavy bleeding: soaking more than 1 pad per hour; Passing clots an egg-sized clot or bigger may mean you have an postpartum hemorrhage  Foul odor from vagina or bad smelling or discolored discharge or blood  Breast infection (Mastitis symptoms); breast pain, chills, fever, redness or red streaks, may feel flu like symptoms  Urinary pain, burning or frequency  Incision that is not healing, increased redness, swelling, tenderness or pain, or any pus from episiotomy or  site may mean you have an infection  Redness, swelling, warmth, or painful to touch in the calf area of your leg may mean you have a blood clot  Severe or intensified depression, thoughts or feelings of wanting to hurt yourself or someone else   Pain in chest, obstructed breathing or shortness of breath (trouble catching your breath) may mean you are having a postpartum complication. Call your provider immediately   Headache that does not get better, even after taking medicine, a bad headache with vision changes or pain in the upper right area of your belly may mean you have high blood pressure or post birth preeclampsia. Call your provider immediately    HAND WASHING  All family and friends should wash their hands:  Before and after holding the baby  Before feeding the baby  After using the restroom or changing the baby's diaper    WOUND CARE  Ask your physician for additional care instructions. In general:   Incision:  May shower and pat incision dry   Keep the incision clean and dry  There should not be any  opening or pus from the incision  Continue to walk at home 3 times a day   Do NOT lift anything heavier than your baby (over 10 pounds)  Encourage family to help participate in care of the  to allow rest and mom time to heal  Episiotomy/Laceration  May use dat-spray bottle, witch hazel pads and dermaplast spray for comfort  Use dat-spray bottle after urinating to cleanse perineal area  To prevent burning during urination spray dat-water bottle on labial area   Pat perineal area dry until episiotomy/laceration is healed  Continue to use dat-bottle until bleeding stops as needed  If have a 2nd degree laceration or greater, a Sitz bath can offer relief from soreness, burning, and inflammation   Sitz Bath   Sit in 6 inches of warm water and soak laceration as needed until the laceration heals    VAGINAL CARE AND BLEEDING  Nothing inside vagina for 6 weeks:   No sexual intercourse, tampons or douching  Bleeding may continue for 2-4 weeks. Amount and color may vary  Soaking 1 pad or more in an hour for several hours is considered heavy bleeding  Passing large egg sized blood clots can be concerning  If you feel like you have heavy bleeding or are having increasing amount of blood clots call your Obstetrician immediately  If you begin feeling faint upon standing, feeling sick to your stomach, have clammy skin, a really fast heartbeat, have chills, start feeling confused, dizzy, sleepy or weak, or feeling like you're going to faint call your Obstetrician immediately    HYPERTENSION   Preeclampsia or gestational hypertension are types of high blood pressure that only pregnant women can get. It is important for you to be aware of symptoms to seek early intervention and treatment. If you have any of these symptoms immediately call your Obstetrician    Vision changes or blurred vision   Severe headache or pain that is unrelieved with medication and will not go away  Persistent pain in upper abdomen or shoulder  "  Increased swelling of face, feet, or hands  Difficulty breathing or shortness of breath at rest  Urinating less than usual    URINATION AND BOWEL MOVEMENTS  Eating more fiber (bran cereal, fruits, and vegetables) and drinking plenty of fluids will help to avoid constipation  Urinary frequency and urgency after childbirth is normal  If you experience any urinary pain, burning or frequency call your provider    BIRTH CONTROL  It is possible to become pregnant at any time after delivery and while breastfeeding  Plan to discuss a method of birth control with your physician at your post delivery follow up visit    POSTPARTUM BLUES  During the first few days after birth, you may experience a sense of the \"blues\" which may include impatience, irritability or even crying. These feelings come and go quickly. However, as many as 1 in 10 women experience emotional symptoms known as postpartum depression.     POSTPARTUM DEPRESSION    May start as early as the second or third day after delivery or take several weeks or months to develop. Symptoms of \"blues\" are present, but are more intense: Crying spells; loss of appetite; feelings of hopelessness or loss of control; fear of touching the baby; over concern or no concern at all about the baby; little or no concern about your own appearance/caring for yourself; and/or inability to sleep or excessive sleeping. Contact your Obstetrician if you are experiencing any of these symptoms     PREVENTING SHAKEN BABY  If you are angry or stressed, PUT THE BABY IN THE CRIB, step away, take some deep breaths, and wait until you are calm to care for the baby. DO NOT SHAKE THE BABY. You are not alone, call a supporter for help.  [facility] 24/7 crisis call line ([phone]) or (1-847.316.8319)  You can also text them, text \"ANSWER\" (148188)  "

## 2024-03-11 NOTE — PROGRESS NOTES
Received report from Gi MCRAE. Educated pt on today's POC. All questions answered at this time. Call light within reach.    1415 Educated pt on discharge paperwork. Pt verbalized understanding. All questions answered. Bands verified, cuddles removed     1600 Pts walked out. Car seat checked in MOB car

## 2024-03-11 NOTE — LACTATION NOTE
This note was copied from a baby's chart.  Follow up consultation:    Mom reports baby has been latching frequently to the breast. She reports that her nipples are sore. Baby currently latched in cradle position on the left side, latch appears to be deep, but mom reports some discomfort. She would like assistance on the right side, she says that the latch is more difficult on the right. LC offered assistance and mom agrees.     Bilateral breast assessment WNL, nipples everted and intact, no damage noted.    Baby placed skin to skin in cross cradle position on the right side. LC assisted with proper positioning, breast wedging and asymmetrical latch technique. When presented the nipple, baby opened mouth with a wide gape and latched deeply to the breast. Organized and nutritive suck pattern noted, and pointed out to mom. Audible swallows noted.       Breast feeding education provided: Education provided regarding the milk making process and supply in demand. Frequent skin to skin encouraged. Encouraged MOB to offer the breast to baby any time he is showing hunger cues (cues reviewed). Encouraged MOB not to limit baby's time at the breast. Anticipatory guidance provided regarding typical  feeding behaviors in the first 24-48hrs, including cluster feeding. Proper positioning and latch technique verbalized. Education provided regarding the importance of achieving a deep latch with each feeding to ensure proper stimulation, milk transfer, and reduce the chance for nipple damage/pain. Watch for stools to become yellow/green by day 5.     Plan: Continue to feed baby on demand at least 8 times every 24hrs. Offer both breasts at each feeding. Frequent skin to skin. Do not limit baby's time at the breast. Reach out to RN/LC for assistance while inpatient. [facility] outpatient lactation consultant handout provided. [facility] place [facility]

## 2024-03-11 NOTE — DISCHARGE SUMMARY
Discharge Summary:     Date of Admission: 3/9/2024  Date of Discharge: 24      Admitting diagnosis:    1. Pregnancy @ 39w1d  2. H/o Depression      Discharge Diagnosis:   1. Status post vaginal, spontaneous on 3/10/24  2. H/o Depression    Past Medical History:   Diagnosis Date    Depression 2020     OB History    Para Term  AB Living   7 3 3   4 3   SAB IAB Ectopic Molar Multiple Live Births   1 3     0 3      # Outcome Date GA Lbr Ranjeet/2nd Weight Sex Delivery Anes PTL Lv   7 Term 03/10/24 39w1d 01:59 / 00:14 3.32 kg (7 lb 5.1 oz) M Vag-Spont EPI Y GWENDOLYN   6 Term 20 38w3d 01:50 / 00:33 3.62 kg (7 lb 15.7 oz) M Vag-Spont EPI  GWENDOLYN   5 IAB            4 IAB            3 Term 17 40w0d  3.629 kg (8 lb) M Vag-Spont EPI  GWENDOLYN   2 SAB            1 IAB              Past Surgical History:   Procedure Laterality Date    LIPOSUCTION      MAMMOPLASTY AUGMENTATION      OTHER      Nose surgery     Pcn [penicillins]    Patient Active Problem List   Diagnosis    Encounter for supervision of other normal pregnancy, second trimester    Dysthymic disorder    History of pyelonephritis during pregnancy       Hospital Course:   Pt is a 23 y.o. now  who presented on 3/9/2024 for elective IOL. Labor induction performed with pitocin and AROM. Epidural anesthesia was utilized with good effect on pain. Single male infant was delivered via  at 39w1d. Apgars 8, 9 at 1 and 5 minutes respectively.  ml.     Postpartum course notable for early ambulation, well managed pain, tolerance of diet, spontaneous voiding, and appropriate feeding of infant. She has remained afebrile and blood pressure has been well controlled. All maternal questions and concerns addressed    Physical Exam:  Temp:  [36.2 °C (97.2 °F)-37 °C (98.6 °F)] 36.7 °C (98.1 °F)  Pulse:  [61-89] 61  Resp:  [18] 18  BP: ()/(50-65) 131/60  SpO2:  [94 %-97 %] 97 %  Physical Exam  General: well appearing, no apparent  distress  Abdomen: soft, nontender, nondistended  CV: RRR, nl S1 and S2  Resp: unlabored, symmetric chest rise, CTAB  Fundus: firm at level below umbilicus  Perineum: Deferred  Extremities: symmetric, no peripheral edema, calves nontender    Current Facility-Administered Medications   Medication Dose    ropivacaine 0.2 % (Naropin) injection      lactated ringers infusion      PRN oxytocin (PITOCIN) (20 Units/1000 mL) PRN for excessive uterine bleeding - See Admin Instr  125-999 mL/hr    miSOPROStol (Cytotec) tablet 600 mcg  600 mcg    docusate sodium (Colace) capsule 100 mg  100 mg    magnesium hydroxide (Milk Of Magnesia) suspension 30 mL  30 mL    ibuprofen (Motrin) tablet 800 mg  800 mg    acetaminophen (Tylenol) tablet 1,000 mg  1,000 mg    prenatal plus vitamin (Stuartnatal 1+1) 27-1 MG tablet 1 Tablet  1 Tablet    sertraline (Zoloft) tablet 50 mg  50 mg    oxytocin (Pitocin) infusion (for post delivery)  125 mL/hr    D5LR infusion      oxytocin (Pitocin) infusion (for induction)  0.5-20 kayli-units/min       Recent Labs     03/09/24  2120 03/11/24  0321   WBC 9.2 11.7*   RBC 4.16* 4.10*   HEMOGLOBIN 11.7* 11.7*   HEMATOCRIT 35.3* 35.3*   MCV 84.9 86.1   MCH 28.1 28.5   MCHC 33.1 33.1   RDW 44.7 45.6   PLATELETCT 226 206   MPV 10.6 11.0         Activity/ Discharge Instructions::   Discharge to home  Pelvic Rest x 6 weeks  No heavy lifting x4 weeks  Call or come to ED for: heavy vaginal bleeding, fever >100.4, severe abdominal pain, severe headache, chest pain, shortness of breath,  N/V, incisional drainage, or other concerns.  Contraception: Not desired, plans on abstinence.       Follow up:  [facility] in 2 weeks    Discharge Meds:   Current Outpatient Medications   Medication Sig Dispense Refill    acetaminophen (TYLENOL) 500 MG Tab Take 2 Tablets by mouth every 6 hours as needed for Moderate Pain or Mild Pain. Do not exceed 4,000 mg in 24 hours 30 Tablet 0    docusate sodium 100 MG Cap Take 100 mg  by mouth 2 times a day as needed for Constipation. 60 Capsule 0    ibuprofen (MOTRIN) 600 MG Tab Take 1 Tablet by mouth every 8 hours as needed for Moderate Pain or Mild Pain. 30 Tablet 0     Continue home sertraline and PNV      Elda Weber M.D.

## 2024-03-27 RX ORDER — METRONIDAZOLE 500 MG/1
500 TABLET ORAL 2 TIMES DAILY
Qty: 14 TABLET | Refills: 0 | Status: SHIPPED | OUTPATIENT
Start: 2024-03-27

## 2024-04-01 ENCOUNTER — TELEPHONE (OUTPATIENT)
Dept: OBGYN | Facility: CLINIC | Age: 24
End: 2024-04-01

## 2024-04-01 ENCOUNTER — APPOINTMENT (OUTPATIENT)
Dept: OBGYN | Facility: CLINIC | Age: 24
End: 2024-04-01
Payer: COMMERCIAL

## 2024-04-01 NOTE — TELEPHONE ENCOUNTER
4/1 10:52 am - Attempted to call pt to r/s appt that was cancelled. Pt unable to answer phone and no vm set up. Will attempt to call later.

## 2024-05-08 ENCOUNTER — APPOINTMENT (OUTPATIENT)
Dept: OBGYN | Facility: CLINIC | Age: 24
End: 2024-05-08
Payer: COMMERCIAL

## 2024-05-08 ENCOUNTER — POST PARTUM (OUTPATIENT)
Dept: OBGYN | Facility: CLINIC | Age: 24
End: 2024-05-08
Payer: COMMERCIAL

## 2024-05-08 VITALS — BODY MASS INDEX: 30.11 KG/M2 | SYSTOLIC BLOOD PRESSURE: 110 MMHG | DIASTOLIC BLOOD PRESSURE: 62 MMHG | WEIGHT: 175.4 LBS

## 2024-05-08 DIAGNOSIS — Z53.8 APPOINTMENT CANCELED BY HOSPITAL: ICD-10-CM

## 2024-05-08 DIAGNOSIS — N81.9 VAGINAL VAULT PROLAPSE: ICD-10-CM

## 2024-05-08 PROCEDURE — 99999 PR NO CHARGE: CPT | Performed by: ADVANCED PRACTICE MIDWIFE

## 2024-05-08 PROCEDURE — 0503F POSTPARTUM CARE VISIT: CPT | Performed by: ADVANCED PRACTICE MIDWIFE

## 2024-05-08 ASSESSMENT — FIBROSIS 4 INDEX: FIB4 SCORE: 0.49

## 2024-05-08 NOTE — PROGRESS NOTES
Subjective   Subjective:    Caitlyn Diamond is a 23 y.o. female who presents for her postpartum exam 8 weeks following . Her prenatal course was uncomplicated.  Her delivery was uncomplicated. Her method of feeding infant is breastfeeding. She desires nothing for her birth control method. Reports no sex prior to this appointment. Patient denies crying spells, irritability, or mood swings.     Patient with concerns today regarding vaginal prolapse. This was noted during the pregnancy and patient had requested referral to Dr. Box. She denies leaking of urine at current.     Eating a regular diet without difficulty.   Bowel movement are Normal.        Problem List     Patient Active Problem List    Diagnosis Date Noted    History of pyelonephritis during pregnancy 11/10/2023    Encounter for supervision of other normal pregnancy, second trimester 2020    Dysthymic disorder 2015       Objective    EDPS 1    Objective    Well nourished female in no acute distress  A& O x 3  Respirations clear and non labored on room air  No edema noted and pulses WNL bilaterally in lower extremities; no claudication  BS x 4; no guarding or tenderness  Breasts: no erythema or discharge. No masses or tenderness.     Genitourinary: Pelvic exam was performed with patient supine. External genitalia with no abnormal pigmentation, labial fusion, rash, tenderness, lesion or injury to the labia bilaterally. Vagina is moist with no lesions, foul discharge, erythema, tenderness or bleeding. Mild discomfort with manipulation of introitus. Epis/laceration well healed.  Right hymenal tissue noted to be pendulous to right labia.     Chaperone was offered and provided by A Duong White    Assessment   Assessment:    1. Postpartum Exam  2. General Counseling and Advice on Contraception  3. Screening for Postpartum Depression  4. Hymenal Ring Hypertrophy    Plan   Plan:    1. Breastfeeding support   2. Continue PNV   3.  Contraceptive counseling- declines but did discuss potential for BTL.   4. Discussed diet, exercise and resumption of sexual activity.  Discussed signs and symptoms of stress incontinence and reviewed pelvic floor exercises.    5. Refer to Dr. Box for evaluation of possible pelvic floor prolapse

## 2024-05-23 ENCOUNTER — TELEMEDICINE (OUTPATIENT)
Dept: MEDICAL GROUP | Facility: IMAGING CENTER | Age: 24
End: 2024-05-23
Payer: COMMERCIAL

## 2024-05-23 VITALS — BODY MASS INDEX: 30.22 KG/M2 | HEIGHT: 64 IN | WEIGHT: 177 LBS

## 2024-05-23 DIAGNOSIS — L70.9 ACNE, UNSPECIFIED ACNE TYPE: ICD-10-CM

## 2024-05-23 DIAGNOSIS — J34.2 DEVIATED SEPTUM: ICD-10-CM

## 2024-05-23 PROCEDURE — 99214 OFFICE O/P EST MOD 30 MIN: CPT | Mod: 95

## 2024-05-23 RX ORDER — CLINDAMYCIN PHOSPHATE 10 MG/G
1 GEL TOPICAL
COMMUNITY
End: 2024-05-23 | Stop reason: SDUPTHER

## 2024-05-23 RX ORDER — CLINDAMYCIN PHOSPHATE 10 MG/G
1 GEL TOPICAL 2 TIMES DAILY
Qty: 75 ML | Refills: 6 | Status: SHIPPED | OUTPATIENT
Start: 2024-05-23

## 2024-05-23 ASSESSMENT — FIBROSIS 4 INDEX: FIB4 SCORE: 0.49

## 2024-05-23 ASSESSMENT — PAIN SCALES - GENERAL: PAINLEVEL: NO PAIN

## 2024-05-23 NOTE — PROGRESS NOTES
"Virtual Visit: Established Patient   This visit was conducted via Zoom using secure and encrypted videoconferencing technology.   The patient was in their home in the state [facility].    The patient's identity was confirmed and verbal consent was obtained for this virtual visit.    Subjective:   CC:   Chief Complaint   Patient presents with    Referral Needed     Septoplasty     Medication Management     Refills      Caitlyn Diamond is a 23 y.o. female presenting for evaluation and management of:    Deviated septum  Acute on Chronic condition. Patient admits being diagnosed with deviated septum at age 10.  She did undergo septoplasty at age 14.  However, she reports that it was a \"botched surgery.\" Her symptoms did not improve. She has managed and tolerated her symptoms all these years. However, her symptoms have recently worsened in the last few weeks. She is having nostril pain and difficulty breathing. She is requesting referral to ENT- [place].    Body acne   Chronic and ongoing condition.  Patient is requesting refill on clindamycin gel.    ROS per hpi  Gen: no fevers/chills  Pulm: no sob, no cough  CV: no chest pain, no palpitations, no edema  GI: no nausea/vomiting, no diarrhea  Skin: no rash    Current medicines (including changes today)  Current Outpatient Medications   Medication Sig Dispense Refill    clindamycin 1 % Gel Apply 1 g topically 2 times a day. 75 mL 6    sertraline (ZOLOFT) 50 MG Tab TAKE 1 TABLET BY MOUTH EVERY DAY 30 Tablet 0    Misc. Devices (BREAST PUMP) Misc For breastfeeding difficulty or milk storage 1 Each 0    Prenatal Vit-Fe Fumarate-FA (PRENATAL 1+1 PO) Take  by mouth.      metroNIDAZOLE (FLAGYL) 500 MG Tab Take 1 Tablet by mouth 2 times a day. (Patient not taking: Reported on 5/8/2024) 14 Tablet 0    acetaminophen (TYLENOL) 500 MG Tab Take 2 Tablets by mouth every 6 hours as needed for Moderate Pain or Mild Pain. Do not exceed 4,000 mg in 24 hours (Patient not taking: " "Reported on 5/8/2024) 30 Tablet 0    docusate sodium 100 MG Cap Take 1 capsule by mouth 2 times a day as needed for Constipation. (Patient not taking: Reported on 5/8/2024) 60 Capsule 0    ibuprofen (MOTRIN) 600 MG Tab Take 1 Tablet by mouth every 8 hours as needed for Moderate Pain or Mild Pain. (Patient not taking: Reported on 5/8/2024) 30 Tablet 0     No current facility-administered medications for this visit.       Patient Active Problem List    Diagnosis Date Noted    History of pyelonephritis during pregnancy 11/10/2023    Encounter for supervision of other normal pregnancy, second trimester 06/17/2020    Dysthymic disorder 04/23/2015        Objective:   Ht 1.626 m (5' 4\")   Wt 80.3 kg (177 lb)   LMP 05/20/2024 (Within Days)   Breastfeeding Yes   BMI 30.38 kg/m²     Physical Exam:  Constitutional: Alert, no distress, well-groomed.  Skin: No rashes in visible areas.  Eye: Round. Conjunctiva clear, lids normal. No icterus.   ENMT: Lips pink without lesions, good dentition, moist mucous membranes. Phonation normal.  Neck: No masses, no thyromegaly. Moves freely without pain.  Respiratory: Unlabored respiratory effort, no cough or audible wheeze  Psych: Alert and oriented x3, normal affect and mood.     Assessment and Plan:   The following treatment plan was discussed:     1. Deviated septum  Referral to ENT      2. Acne, unspecified acne type  clindamycin 1 % Gel        Medical Decision Making/Course:  In the course of preparing for this visit with review of the pertinent past medical history, recent and past clinic visits, current medications, and performing chart, immunization, medical history and medication reconciliation, and in the further course of obtaining the current history pertinent to the clinic visit today, performing an exam and evaluation, ordering and independently evaluating labs, tests, and/or procedures, prescribing any recommended new medications as noted above, providing any pertinent " counseling and education and recommending further coordination of care. This was discussed with patient in a shared-decision making conversation, and they understand and agreed with plan of care.     Follow-up: Return if symptoms worsen or fail to improve.    Thank you, Nandini SOUTH  [facility]

## 2024-06-28 ENCOUNTER — OFFICE VISIT (OUTPATIENT)
Dept: URGENT CARE | Facility: PHYSICIAN GROUP | Age: 24
End: 2024-06-28
Payer: COMMERCIAL

## 2024-06-28 ENCOUNTER — HOSPITAL ENCOUNTER (OUTPATIENT)
Facility: MEDICAL CENTER | Age: 24
End: 2024-06-28
Attending: PHYSICIAN ASSISTANT
Payer: COMMERCIAL

## 2024-06-28 VITALS
RESPIRATION RATE: 18 BRPM | DIASTOLIC BLOOD PRESSURE: 64 MMHG | SYSTOLIC BLOOD PRESSURE: 108 MMHG | WEIGHT: 171.08 LBS | BODY MASS INDEX: 29.21 KG/M2 | HEIGHT: 64 IN | OXYGEN SATURATION: 96 % | TEMPERATURE: 97.6 F | HEART RATE: 82 BPM

## 2024-06-28 DIAGNOSIS — N89.8 VAGINAL IRRITATION: ICD-10-CM

## 2024-06-28 PROCEDURE — 87491 CHLMYD TRACH DNA AMP PROBE: CPT

## 2024-06-28 PROCEDURE — 87660 TRICHOMONAS VAGIN DIR PROBE: CPT

## 2024-06-28 PROCEDURE — 87510 GARDNER VAG DNA DIR PROBE: CPT

## 2024-06-28 PROCEDURE — 87480 CANDIDA DNA DIR PROBE: CPT

## 2024-06-28 PROCEDURE — 87591 N.GONORRHOEAE DNA AMP PROB: CPT

## 2024-06-28 ASSESSMENT — ENCOUNTER SYMPTOMS
SORE THROAT: 0
MYALGIAS: 0
CONSTIPATION: 0
CHILLS: 0
NAUSEA: 0
EYE PAIN: 0
VOMITING: 0
HEADACHES: 0
SHORTNESS OF BREATH: 0
ABDOMINAL PAIN: 1
COUGH: 0
DIARRHEA: 0
FEVER: 0

## 2024-06-28 ASSESSMENT — FIBROSIS 4 INDEX: FIB4 SCORE: 0.49

## 2024-06-28 NOTE — PROGRESS NOTES
"Subjective:   Caitlyn Diamond is a 23 y.o. female who presents for Sexually Transmitted Diseases (Abd cramping, X 1 week. )      23-year-old female who is around 3 months postpartum presenting with a 1 week history of vaginal cramping, mildly increased vaginal discharge she states \"it feels like chlamydia or maybe bacterial vaginosis\".  She has had a recent new sexual contact    She denies any urinary symptoms.  He has not had any fevers or chills.  She is currently breast-feeding.  She has not had any abnormal bowel movements    Review of Systems   Constitutional:  Negative for chills and fever.   HENT:  Negative for congestion, ear pain and sore throat.    Eyes:  Negative for pain.   Respiratory:  Negative for cough and shortness of breath.    Cardiovascular:  Negative for chest pain.   Gastrointestinal:  Positive for abdominal pain. Negative for constipation, diarrhea, nausea and vomiting.   Genitourinary:  Negative for dysuria.   Musculoskeletal:  Negative for myalgias.   Skin:  Negative for rash.   Neurological:  Negative for headaches.       Medications, Allergies, and current problem list reviewed today in Epic.     Objective:     /64 (BP Location: Left arm, Patient Position: Sitting, BP Cuff Size: Adult)   Pulse 82   Temp 36.4 °C (97.6 °F) (Temporal)   Resp 18   Ht 1.626 m (5' 4\")   Wt 77.6 kg (171 lb 1.2 oz)   SpO2 96%     Physical Exam  Vitals reviewed.   Constitutional:       Appearance: Normal appearance.   HENT:      Head: Normocephalic and atraumatic.      Right Ear: External ear normal.      Left Ear: External ear normal.      Nose: Nose normal.      Mouth/Throat:      Mouth: Mucous membranes are moist.   Eyes:      Conjunctiva/sclera: Conjunctivae normal.   Cardiovascular:      Rate and Rhythm: Normal rate.   Pulmonary:      Effort: Pulmonary effort is normal.   Genitourinary:     Comments: Exam deferred  Skin:     General: Skin is warm and dry.      Capillary Refill: Capillary " refill takes less than 2 seconds.   Neurological:      Mental Status: She is alert and oriented to person, place, and time.         Assessment/Plan:     Diagnosis and associated orders:     1. Vaginal irritation  Chlamydia/GC, PCR (Genital/Anal swab)    VAGINAL PATHOGENS DNA PANEL         Comments/MDM:     Patient with signs of pelvic and vaginal irritation, will have patient self swab for BV and gonorrhea and chlamydia and hold off on treatment until we have these results due to the fact that she is breast-feeding.         Differential diagnosis, natural history, supportive care, and indications for immediate follow-up discussed.    Advised the patient to follow-up with the primary care physician for recheck, reevaluation, and consideration of further management.    Please note that this dictation was created using voice recognition software. I have made a reasonable attempt to correct obvious errors, but I expect that there are errors of grammar and possibly content that I did not discover before finalizing the note.    This note was electronically signed by Ricki Grimaldo PA-C

## 2024-07-02 DIAGNOSIS — B96.89 BV (BACTERIAL VAGINOSIS): ICD-10-CM

## 2024-07-02 DIAGNOSIS — N76.0 BV (BACTERIAL VAGINOSIS): ICD-10-CM

## 2024-07-02 RX ORDER — METRONIDAZOLE 7.5 MG/G
1 GEL VAGINAL
Qty: 5 EACH | Refills: 0 | Status: SHIPPED | OUTPATIENT
Start: 2024-07-02 | End: 2024-07-07

## 2024-09-16 ENCOUNTER — OFFICE VISIT (OUTPATIENT)
Dept: URGENT CARE | Facility: PHYSICIAN GROUP | Age: 24
End: 2024-09-16
Payer: COMMERCIAL

## 2024-09-16 VITALS
DIASTOLIC BLOOD PRESSURE: 62 MMHG | TEMPERATURE: 98.2 F | OXYGEN SATURATION: 98 % | SYSTOLIC BLOOD PRESSURE: 122 MMHG | WEIGHT: 172 LBS | HEART RATE: 100 BPM | RESPIRATION RATE: 16 BRPM | HEIGHT: 64 IN | BODY MASS INDEX: 29.37 KG/M2

## 2024-09-16 DIAGNOSIS — L23.9 ALLERGIC CONTACT DERMATITIS, UNSPECIFIED TRIGGER: ICD-10-CM

## 2024-09-16 DIAGNOSIS — R21 RASH: ICD-10-CM

## 2024-09-16 PROCEDURE — 99213 OFFICE O/P EST LOW 20 MIN: CPT | Performed by: NURSE PRACTITIONER

## 2024-09-16 PROCEDURE — 3074F SYST BP LT 130 MM HG: CPT | Performed by: NURSE PRACTITIONER

## 2024-09-16 PROCEDURE — 3078F DIAST BP <80 MM HG: CPT | Performed by: NURSE PRACTITIONER

## 2024-09-16 RX ORDER — TRIAMCINOLONE ACETONIDE 1 MG/G
1 CREAM TOPICAL 2 TIMES DAILY
Qty: 28.4 G | Refills: 0 | Status: SHIPPED | OUTPATIENT
Start: 2024-09-16 | End: 2024-09-23

## 2024-09-16 RX ORDER — TRIAMCINOLONE ACETONIDE 1 MG/G
1 CREAM TOPICAL 2 TIMES DAILY
Qty: 28.4 G | Refills: 0 | Status: SHIPPED | OUTPATIENT
Start: 2024-09-16 | End: 2024-09-16

## 2024-09-16 ASSESSMENT — ENCOUNTER SYMPTOMS
CONSTITUTIONAL NEGATIVE: 1
FEVER: 0

## 2024-09-16 ASSESSMENT — FIBROSIS 4 INDEX: FIB4 SCORE: 0.49

## 2024-09-16 NOTE — PROGRESS NOTES
Subjective:     Caitlyn Diamond is a 23 y.o. female who presents for Rash (X 1 day Rash on neck, hands peeling)       Rash  This is a new problem. The problem has been gradually worsening since onset. Pertinent negatives include no fever, shortness of breath or sore throat.     Patient reports new onset of anterior and bilateral neck rash.  Also has milder similar symptoms at the right palm and at her chin.    Reports minimal itching with burning sensation.  Has dry, scaly, red patches at the sides of her neck.  Front of her neck improving.  Reports mild, dry, scaly texture at her chin.  Has a small dry, red, scaly patch at her right palm.  Patient concerned of possible shingles.  Has not had chickenpox.  However, has had her vaccine.    This started after wearing the necklace with rhinestones that she has not used before.  Also reports new soaps that she has not used before.  Using Cetaphil which she has not used for 2 years.  But has tolerated this in the past.    Denies fever, shortness of breath, trouble swallowing, sore throat, or other symptoms.    Breast-feeding.    Review of Systems   Constitutional: Negative.  Negative for fever.   HENT:  Negative for sore throat.    Respiratory: Negative.  Negative for shortness of breath.    Skin:  Positive for itching and rash.   All other systems reviewed and are negative.    Refer to HPI for additional details.    During this visit, appropriate PPE was worn, and hand hygiene was performed.    PMH:  has a past medical history of Depression (6/2/2020).    She has no past medical history of Addisons disease ([facility]), Adrenal disorder ([facility]), Allergy, Anemia, Anxiety, Arrhythmia, Arthritis, Asthma, Blood transfusion without reported diagnosis, Cancer ([facility]), Cataract, CHF (congestive heart failure) ([facility]), Clotting disorder ([facility]), COPD (chronic obstructive pulmonary disease) ([facility]), Cushings syndrome ([facility]), Diabetes ([facility]), Diabetic neuropathy ([facility]), GERD (gastroesophageal  reflux disease), Glaucoma, Goiter, Head ache, Heart attack (HCC), Heart murmur, HIV (human immunodeficiency virus infection) ([facility]), Hyperlipidemia, Hypertension, IBD (inflammatory bowel disease), Kidney disease, Meningitis, Migraine, Muscle disorder, Osteoporosis, Parathyroid disorder (HCC), Pituitary disease (HCC), Pulmonary emphysema (HCC), Seizure ([facility]), Sickle cell disease (HCC), Stroke ([facility]), Substance abuse ([facility]), Thyroid disease, Tuberculosis, or Urinary tract infection.    MEDS:   Current Outpatient Medications:     triamcinolone acetonide (KENALOG) 0.1 % Cream, Apply 1 Application topically 2 times a day for 7 days., Disp: 28.4 g, Rfl: 0    sertraline (ZOLOFT) 50 MG Tab, TAKE 1 TABLET BY MOUTH EVERY DAY, Disp: 90 Tablet, Rfl: 1    clindamycin 1 % Gel, Apply 1 g topically 2 times a day. (Patient not taking: Reported on 6/28/2024), Disp: 75 mL, Rfl: 6    acetaminophen (TYLENOL) 500 MG Tab, Take 2 Tablets by mouth every 6 hours as needed for Moderate Pain or Mild Pain. Do not exceed 4,000 mg in 24 hours (Patient not taking: Reported on 5/8/2024), Disp: 30 Tablet, Rfl: 0    docusate sodium 100 MG Cap, Take 1 capsule by mouth 2 times a day as needed for Constipation. (Patient not taking: Reported on 5/8/2024), Disp: 60 Capsule, Rfl: 0    ibuprofen (MOTRIN) 600 MG Tab, Take 1 Tablet by mouth every 8 hours as needed for Moderate Pain or Mild Pain. (Patient not taking: Reported on 5/8/2024), Disp: 30 Tablet, Rfl: 0    ALLERGIES:   Allergies   Allergen Reactions    Pcn [Penicillins] Rash     Rash     SURGHX:   Past Surgical History:   Procedure Laterality Date    LIPOSUCTION      MAMMOPLASTY AUGMENTATION      OTHER      Nose surgery     SOCHX:  reports that she has never smoked. She has never used smokeless tobacco. She reports current alcohol use. She reports current drug use. Drugs: Inhaled and Marijuana.    FH: Per HPI as applicable/pertinent.      Objective:     /62   Pulse 100   Temp 36.8 °C (98.2 °F)  "(Temporal)   Resp 16   Ht 1.626 m (5' 4\")   Wt 78 kg (172 lb)   SpO2 98%   BMI 29.52 kg/m²     Physical Exam  Nursing note reviewed.   Constitutional:       General: She is not in acute distress.     Appearance: She is well-developed. She is not ill-appearing or toxic-appearing.   Eyes:      General: Vision grossly intact.   Neck:      Trachea: Phonation normal.   Cardiovascular:      Rate and Rhythm: Normal rate.   Pulmonary:      Effort: Pulmonary effort is normal. No respiratory distress.   Musculoskeletal:         General: No deformity. Normal range of motion.   Skin:     Coloration: Skin is not pale.      Findings: Rash present. Rash is scaling. Rash is not crusting, papular, pustular or vesicular.      Comments: Dry, scaly, erythematous patches at bilateral neck, smaller patch at right palm, dry, scaly texture chin, no discharge or crusting, no vesicles   Neurological:      Mental Status: She is alert and oriented to person, place, and time.      Motor: No weakness.   Psychiatric:         Behavior: Behavior normal. Behavior is cooperative.       Assessment/Plan:     1. Rash  - triamcinolone acetonide (KENALOG) 0.1 % Cream; Apply 1 Application topically 2 times a day for 7 days.  Dispense: 28.4 g; Refill: 0    2. Allergic contact dermatitis, unspecified trigger  - triamcinolone acetonide (KENALOG) 0.1 % Cream; Apply 1 Application topically 2 times a day for 7 days.  Dispense: 28.4 g; Refill: 0    Rx as above sent electronically.  Identify/avoid possible irritants/allergens.  Continue with hypoallergenic moisturizer.    Symptoms not consistent with shingles.    Monitor. Follow up in 5 days if symptoms do not improve or sooner if symptoms change or worsen.     Differential diagnosis, natural history, supportive care, over-the-counter symptom management per 's instructions, close monitoring, and indications for immediate follow-up discussed.     All questions answered. Patient agrees with the plan " of care.    Discharge summary provided via [employer].

## 2024-09-23 ASSESSMENT — VISUAL ACUITY: OU: 1

## 2024-09-23 ASSESSMENT — ENCOUNTER SYMPTOMS
RESPIRATORY NEGATIVE: 1
SHORTNESS OF BREATH: 0
SORE THROAT: 0

## 2024-10-23 ENCOUNTER — OFFICE VISIT (OUTPATIENT)
Dept: URGENT CARE | Facility: PHYSICIAN GROUP | Age: 24
End: 2024-10-23
Payer: COMMERCIAL

## 2024-10-23 VITALS
WEIGHT: 176 LBS | DIASTOLIC BLOOD PRESSURE: 78 MMHG | OXYGEN SATURATION: 94 % | BODY MASS INDEX: 30.05 KG/M2 | HEART RATE: 89 BPM | RESPIRATION RATE: 20 BRPM | TEMPERATURE: 98.2 F | SYSTOLIC BLOOD PRESSURE: 124 MMHG | HEIGHT: 64 IN

## 2024-10-23 DIAGNOSIS — S16.1XXA STRAIN OF NECK MUSCLE, INITIAL ENCOUNTER: ICD-10-CM

## 2024-10-23 PROCEDURE — 3074F SYST BP LT 130 MM HG: CPT

## 2024-10-23 PROCEDURE — 3078F DIAST BP <80 MM HG: CPT

## 2024-10-23 PROCEDURE — 99213 OFFICE O/P EST LOW 20 MIN: CPT

## 2024-10-23 ASSESSMENT — ENCOUNTER SYMPTOMS
EYE DISCHARGE: 0
EYE PAIN: 0
BACK PAIN: 0
DIARRHEA: 0
TINGLING: 0
SHORTNESS OF BREATH: 0
COUGH: 0
SINUS PAIN: 0
STRIDOR: 0
NECK PAIN: 1
NAUSEA: 0
SPEECH CHANGE: 0
FEVER: 0
WHEEZING: 0
CHILLS: 0
FALLS: 0
VOMITING: 0
SPUTUM PRODUCTION: 0
PHOTOPHOBIA: 0
DIZZINESS: 0
SENSORY CHANGE: 0
WEAKNESS: 0
SORE THROAT: 0
BLURRED VISION: 0
SEIZURES: 0
DOUBLE VISION: 0
EYE REDNESS: 0
HEADACHES: 0
ABDOMINAL PAIN: 0
LOSS OF CONSCIOUSNESS: 0
MYALGIAS: 0
FOCAL WEAKNESS: 0

## 2024-10-23 ASSESSMENT — VISUAL ACUITY: OU: 1

## 2024-10-23 ASSESSMENT — FIBROSIS 4 INDEX: FIB4 SCORE: 0.51

## 2024-10-30 ENCOUNTER — OFFICE VISIT (OUTPATIENT)
Dept: MEDICAL GROUP | Facility: IMAGING CENTER | Age: 24
End: 2024-10-30
Payer: COMMERCIAL

## 2024-10-30 VITALS
HEART RATE: 85 BPM | HEIGHT: 64 IN | BODY MASS INDEX: 29.53 KG/M2 | SYSTOLIC BLOOD PRESSURE: 110 MMHG | DIASTOLIC BLOOD PRESSURE: 70 MMHG | OXYGEN SATURATION: 95 % | WEIGHT: 173 LBS | TEMPERATURE: 97.5 F | RESPIRATION RATE: 18 BRPM

## 2024-10-30 DIAGNOSIS — M54.2 NECK PAIN ON RIGHT SIDE: ICD-10-CM

## 2024-10-30 DIAGNOSIS — M25.511 ACUTE PAIN OF RIGHT SHOULDER: ICD-10-CM
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
## 2024-10-30 ASSESSMENT — FIBROSIS 4 INDEX: FIB4 SCORE: 0.51

## 2024-10-30 ASSESSMENT — PAIN SCALES - GENERAL: PAINLEVEL: 7=MODERATE-SEVERE PAIN

## 2024-11-04 ENCOUNTER — APPOINTMENT (OUTPATIENT)
Dept: RADIOLOGY | Facility: MEDICAL CENTER | Age: 24
End: 2024-11-04
Payer: COMMERCIAL

## 2024-11-04 DIAGNOSIS — M25.511 ACUTE PAIN OF RIGHT SHOULDER: ICD-10-CM
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
## 2024-11-04 DIAGNOSIS — M54.2 NECK PAIN ON RIGHT SIDE: ICD-10-CM

## 2024-11-04 PROCEDURE — 72040 X-RAY EXAM NECK SPINE 2-3 VW: CPT

## 2024-11-04 PROCEDURE — 73030 X-RAY EXAM OF SHOULDER: CPT | Mod: RT

## 2024-11-15 ENCOUNTER — HOSPITAL ENCOUNTER (OUTPATIENT)
Facility: MEDICAL CENTER | Age: 24
End: 2024-11-15
Attending: STUDENT IN AN ORGANIZED HEALTH CARE EDUCATION/TRAINING PROGRAM
Payer: COMMERCIAL

## 2024-11-15 ENCOUNTER — HOSPITAL ENCOUNTER (OUTPATIENT)
Dept: LAB | Facility: MEDICAL CENTER | Age: 24
End: 2024-11-15
Attending: STUDENT IN AN ORGANIZED HEALTH CARE EDUCATION/TRAINING PROGRAM
Payer: COMMERCIAL

## 2024-11-15 ENCOUNTER — OFFICE VISIT (OUTPATIENT)
Dept: MEDICAL GROUP | Facility: PHYSICIAN GROUP | Age: 24
End: 2024-11-15
Payer: COMMERCIAL

## 2024-11-15 VITALS
WEIGHT: 176 LBS | HEART RATE: 74 BPM | OXYGEN SATURATION: 97 % | HEIGHT: 64 IN | BODY MASS INDEX: 30.05 KG/M2 | DIASTOLIC BLOOD PRESSURE: 78 MMHG | TEMPERATURE: 97 F | SYSTOLIC BLOOD PRESSURE: 122 MMHG

## 2024-11-15 DIAGNOSIS — Z11.59 NEED FOR HEPATITIS C SCREENING TEST: ICD-10-CM

## 2024-11-15 DIAGNOSIS — R30.0 DYSURIA: ICD-10-CM

## 2024-11-15 DIAGNOSIS — Z87.440 HISTORY OF PYELONEPHRITIS DURING PREGNANCY: ICD-10-CM

## 2024-11-15 DIAGNOSIS — Z11.3 SCREENING EXAMINATION FOR SEXUALLY TRANSMITTED DISEASE: ICD-10-CM

## 2024-11-15 DIAGNOSIS — Z87.59 HISTORY OF PYELONEPHRITIS DURING PREGNANCY: ICD-10-CM

## 2024-11-15 LAB
APPEARANCE UR: CLEAR
BILIRUB UR STRIP-MCNC: NEGATIVE MG/DL
CANDIDA DNA VAG QL PROBE+SIG AMP: NEGATIVE
COLOR UR AUTO: YELLOW
G VAGINALIS DNA VAG QL PROBE+SIG AMP: POSITIVE
GLUCOSE UR STRIP.AUTO-MCNC: NEGATIVE MG/DL
HBV SURFACE AB SERPL IA-ACNC: 5.69 MIU/ML (ref 0–10)
HCV AB SER QL: NORMAL
HIV 1+2 AB+HIV1 P24 AG SERPL QL IA: NORMAL
KETONES UR STRIP.AUTO-MCNC: NEGATIVE MG/DL
LEUKOCYTE ESTERASE UR QL STRIP.AUTO: NEGATIVE
NITRITE UR QL STRIP.AUTO: NEGATIVE
PH UR STRIP.AUTO: 6 [PH] (ref 5–8)
PROT UR QL STRIP: NEGATIVE MG/DL
RBC UR QL AUTO: NEGATIVE
SP GR UR STRIP.AUTO: <=1.005
T PALLIDUM AB SER QL IA: NORMAL
T VAGINALIS DNA VAG QL PROBE+SIG AMP: NEGATIVE
UROBILINOGEN UR STRIP-MCNC: NORMAL MG/DL

## 2024-11-15 PROCEDURE — 87660 TRICHOMONAS VAGIN DIR PROBE: CPT

## 2024-11-15 PROCEDURE — 87491 CHLMYD TRACH DNA AMP PROBE: CPT

## 2024-11-15 PROCEDURE — 86803 HEPATITIS C AB TEST: CPT

## 2024-11-15 PROCEDURE — 81002 URINALYSIS NONAUTO W/O SCOPE: CPT | Performed by: STUDENT IN AN ORGANIZED HEALTH CARE EDUCATION/TRAINING PROGRAM

## 2024-11-15 PROCEDURE — 36415 COLL VENOUS BLD VENIPUNCTURE: CPT

## 2024-11-15 PROCEDURE — 87389 HIV-1 AG W/HIV-1&-2 AB AG IA: CPT

## 2024-11-15 PROCEDURE — 3078F DIAST BP <80 MM HG: CPT | Performed by: STUDENT IN AN ORGANIZED HEALTH CARE EDUCATION/TRAINING PROGRAM

## 2024-11-15 PROCEDURE — 87086 URINE CULTURE/COLONY COUNT: CPT | Mod: 91

## 2024-11-15 PROCEDURE — 99213 OFFICE O/P EST LOW 20 MIN: CPT | Performed by: STUDENT IN AN ORGANIZED HEALTH CARE EDUCATION/TRAINING PROGRAM

## 2024-11-15 PROCEDURE — 87086 URINE CULTURE/COLONY COUNT: CPT

## 2024-11-15 PROCEDURE — 86706 HEP B SURFACE ANTIBODY: CPT

## 2024-11-15 PROCEDURE — 87510 GARDNER VAG DNA DIR PROBE: CPT

## 2024-11-15 PROCEDURE — 87591 N.GONORRHOEAE DNA AMP PROB: CPT

## 2024-11-15 PROCEDURE — 3074F SYST BP LT 130 MM HG: CPT | Performed by: STUDENT IN AN ORGANIZED HEALTH CARE EDUCATION/TRAINING PROGRAM

## 2024-11-15 PROCEDURE — 86780 TREPONEMA PALLIDUM: CPT

## 2024-11-15 PROCEDURE — 87480 CANDIDA DNA DIR PROBE: CPT

## 2024-11-15 RX ORDER — CEPHALEXIN 500 MG/1
500 CAPSULE ORAL 2 TIMES DAILY
Qty: 7 CAPSULE | Refills: 0 | Status: SHIPPED | OUTPATIENT
Start: 2024-11-15 | End: 2024-11-15

## 2024-11-15 RX ORDER — CEPHALEXIN 500 MG/1
500 CAPSULE ORAL 2 TIMES DAILY
Qty: 7 CAPSULE | Refills: 0 | OUTPATIENT
Start: 2024-11-15

## 2024-11-15 RX ORDER — CEPHALEXIN 500 MG/1
500 CAPSULE ORAL 2 TIMES DAILY
Qty: 14 CAPSULE | Refills: 0 | Status: SHIPPED | OUTPATIENT
Start: 2024-11-15 | End: 2024-11-22

## 2024-11-15 ASSESSMENT — FIBROSIS 4 INDEX: FIB4 SCORE: 0.51

## 2024-11-15 NOTE — PROGRESS NOTES
"Subjective:   Verbal consent was acquired by the patient to use [employer] ambient listening note generation during this visit Yes     CC: Left CVA pain    History of Present Illness  Ms. Diamond is a pleasant 23 yo established patient of Nandini Eber who presents today with left sided back pain.    She reports experiencing severe kidney pain, which has been so intense that it disrupted her sleep last night. She is seeking an MRI to further investigate the cause of her discomfort. She also mentions a history of painful urination and is requesting STI testing. She denies experiencing any fevers, chills, nausea, or vomiting. She has no history of kidney stones, but does have a history of kidney infections, which have previously required overnight hospital stays. She is currently breastfeeding and had been taking Keflex during her pregnancy.    ALLERGIES  She is allergic to PENICILLIN.    Patient Active Problem List    Diagnosis Date Noted    History of pyelonephritis during pregnancy 11/10/2023    Encounter for supervision of other normal pregnancy, second trimester 06/17/2020    Dysthymic disorder 04/23/2015     Health Maintenance: Completed    ROS: Negative, except as noted above      Objective:     Exam:  /78 (BP Location: Right arm, Patient Position: Sitting, BP Cuff Size: Adult)   Pulse 74   Temp 36.1 °C (97 °F)   Ht 1.626 m (5' 4\")   Wt 79.8 kg (176 lb)   LMP 10/22/2024 (Exact Date)   SpO2 97%   BMI 30.21 kg/m²  Body mass index is 30.21 kg/m².    Physical Exam  Constitutional:       Appearance: Normal appearance.   Eyes:      Extraocular Movements: Extraocular movements intact.   Abdominal:      Tenderness: There is left CVA tenderness. There is no right CVA tenderness.   Neurological:      Mental Status: She is alert.             Assessment & Plan:     24 y.o. female with the following -     1. Dysuria  2. History of pyelonephritis during pregnancy  Acute, ongoing.  Patient presenting with dysuria and " left CVA tenderness.  Patient reports having history of pyelonephritis during her pregnancy.  UA collected with no signs of infection.  Will send urine for culture.  Due to patient's previous history of pyelonephritis and having similar symptoms we will treat patient with 7-day course of Keflex. Once results of the urine culture are in will notify the patient. Will also obtain renal ultrasound in setting of patients history of pyelonephritis. Patient was advised to go the ED for worsening pain, fevers/chills, and nausea/vomiting. Patient verbalized understanding.   - POCT Urinalysis  - cephALEXin (KEFLEX) 500 MG Cap; Take 1 Capsule by mouth 2 times a day for 14 days.  Dispense: 7 Capsule; Refill: 0  - URINE CULTURE(NEW); Future  - US-RENAL; Future    3. Screening examination for sexually transmitted disease  - Chlamydia/GC, PCR (Urine); Future  - HIV AG/AB COMBO ASSAY SCREENING; Future  - HEP B SURFACE AB; Future  - HEP C VIRUS ANTIBODY; Future  - T.PALLIDUM AB DEYA (SCREENING); Future    4. Need for hepatitis C screening test  - HEP C VIRUS ANTIBODY; Future          Return if symptoms worsen or fail to improve.    Please note that this dictation was created using voice recognition software. I have made every reasonable attempt to correct obvious errors, but I expect that there are errors of grammar and possibly content that I did not discover before finalizing the note.

## 2024-11-16 ENCOUNTER — HOSPITAL ENCOUNTER (EMERGENCY)
Facility: MEDICAL CENTER | Age: 24
End: 2024-11-16
Attending: EMERGENCY MEDICINE
Payer: COMMERCIAL

## 2024-11-16 ENCOUNTER — PHARMACY VISIT (OUTPATIENT)
Dept: PHARMACY | Facility: MEDICAL CENTER | Age: 24
End: 2024-11-16
Payer: COMMERCIAL

## 2024-11-16 VITALS
OXYGEN SATURATION: 94 % | HEIGHT: 64 IN | DIASTOLIC BLOOD PRESSURE: 55 MMHG | BODY MASS INDEX: 29.73 KG/M2 | TEMPERATURE: 97.8 F | SYSTOLIC BLOOD PRESSURE: 106 MMHG | HEART RATE: 66 BPM | RESPIRATION RATE: 16 BRPM | WEIGHT: 174.16 LBS

## 2024-11-16 DIAGNOSIS — B96.89 BACTERIAL VAGINOSIS: ICD-10-CM

## 2024-11-16 DIAGNOSIS — A74.9 CHLAMYDIA INFECTION: ICD-10-CM

## 2024-11-16 DIAGNOSIS — N76.0 BACTERIAL VAGINOSIS: ICD-10-CM

## 2024-11-16 LAB
C TRACH DNA SPEC QL NAA+PROBE: POSITIVE
N GONORRHOEA DNA SPEC QL NAA+PROBE: NEGATIVE
SPECIMEN SOURCE: ABNORMAL

## 2024-11-16 PROCEDURE — 99284 EMERGENCY DEPT VISIT MOD MDM: CPT

## 2024-11-16 PROCEDURE — RXMED WILLOW AMBULATORY MEDICATION CHARGE: Performed by: STUDENT IN AN ORGANIZED HEALTH CARE EDUCATION/TRAINING PROGRAM

## 2024-11-16 RX ORDER — METRONIDAZOLE 500 MG/1
500 TABLET ORAL 3 TIMES DAILY
Qty: 21 TABLET | Refills: 0 | Status: SHIPPED | OUTPATIENT
Start: 2024-11-16 | End: 2024-11-16

## 2024-11-16 RX ORDER — METRONIDAZOLE 500 MG/1
500 TABLET ORAL 3 TIMES DAILY
Qty: 21 TABLET | Refills: 0 | Status: ACTIVE | OUTPATIENT
Start: 2024-11-16 | End: 2024-11-23

## 2024-11-16 RX ORDER — DOXYCYCLINE 100 MG/1
100 CAPSULE ORAL 2 TIMES DAILY
Qty: 14 CAPSULE | Refills: 0 | Status: ACTIVE | OUTPATIENT
Start: 2024-11-16 | End: 2024-11-23

## 2024-11-16 RX ORDER — METRONIDAZOLE 500 MG/1
500 TABLET ORAL ONCE
Status: DISCONTINUED | OUTPATIENT
Start: 2024-11-16 | End: 2024-11-16

## 2024-11-16 RX ORDER — DOXYCYCLINE 100 MG/1
100 CAPSULE ORAL 2 TIMES DAILY
Qty: 14 CAPSULE | Refills: 0 | Status: SHIPPED | OUTPATIENT
Start: 2024-11-16 | End: 2024-11-16

## 2024-11-16 RX ORDER — DOXYCYCLINE 100 MG/1
100 TABLET ORAL ONCE
Status: DISCONTINUED | OUTPATIENT
Start: 2024-11-16 | End: 2024-11-16

## 2024-11-16 ASSESSMENT — FIBROSIS 4 INDEX: FIB4 SCORE: 0.51

## 2024-11-17 LAB
BACTERIA UR CULT: NORMAL
BACTERIA UR CULT: NORMAL
SIGNIFICANT IND 70042: NORMAL
SIGNIFICANT IND 70042: NORMAL
SITE SITE: NORMAL
SITE SITE: NORMAL
SOURCE SOURCE: NORMAL
SOURCE SOURCE: NORMAL

## 2024-11-17 NOTE — ED PROVIDER NOTES
ED Provider Note    CHIEF COMPLAINT  Chief Complaint   Patient presents with    Flank Pain     Left flank x 4 days. Cramping & dull pain, colicky in nature. 7:10 pain. Been taking ibuprofen without relief. Been seen by primary and in ER for pain.Pt reports she received a positive chlamydia result and needs antibiotics. Reports normal urination. Yellow vaginal discharge.      LIMITATION TO HISTORY   Select: None    HPI    Caitlyn Diamond is a 24 y.o. female who presents to the Emergency Department for evaluation of left flank pain onset four days ago. The patient denies dysuria. The patient has a history of kidney infection and notes a recent CT with contrast that revealed no signs of acute infection. The patient is taking ibuprofen for pain. The patient reports a recent positive chlamydia test and notes a previous instance of chlamydia. She has been taking her medication as prescribed. She denies any other major medical history. The patient states she is currently breastfeeding.     OUTSIDE HISTORIAN(S):  Select: None    EXTERNAL RECORDS REVIEWED  Select: The patient was seen yesterday for severe right kidney pain at [facility] by Dr. Beaulieu (Family Medicine)    PAST MEDICAL HISTORY  Past Medical History:   Diagnosis Date    Depression 6/2/2020     SURGICAL HISTORY  Past Surgical History:   Procedure Laterality Date    LIPOSUCTION      MAMMOPLASTY AUGMENTATION      OTHER      Nose surgery     FAMILY HISTORY  Family History   Problem Relation Age of Onset    Breast Cancer Maternal Grandmother     Cancer Maternal Grandmother     Cancer Maternal Grandfather       SOCIAL HISTORY  Social History     Socioeconomic History    Marital status: Single     Spouse name: Not on file    Number of children: Not on file    Years of education: Not on file    Highest education level: Not on file   Occupational History    Not on file   Tobacco Use    Smoking status: Never    Smokeless tobacco: Never   Vaping Use     Vaping status: Never Used   Substance and Sexual Activity    Alcohol use: Yes     Comment: social    Drug use: Not Currently     Types: Inhaled, Marijuana     Comment: Marijuana in past 2 years    Sexual activity: Yes     Partners: Male     Comment: Planned pregnancy, not  but baby is welcomed. FOB  is iinvolved and  supportive.   Other Topics Concern    Not on file   Social History Narrative    Not on file     Social Drivers of Health     Financial Resource Strain: Not on File (2022)    [facility]    Financial Resource Strain     Financial Resource Strain: 0   Food Insecurity: Not on File (2024)    Received from [facility]    Food Insecurity     Food: 0   Transportation Needs: Not on File (2022)    [facility]    Transportation Needs     Transportation: 0   Physical Activity: Not on File (2022)    Received from [facility]    Physical Activity     Physical Activity: 0   Stress: Not on File (2022)    [facility] from [facility]    Stress     Stress: 0   Social Connections: Not on File (2024)    Received from [facility]    Social Connections     Connectedness: 0   Intimate Partner Violence: Not on file   Housing Stability: Not on File (2022)    [facility] from [facility]    Housing Stability     [place]     CURRENT MEDICATIONS  No current facility-administered medications on file prior to encounter.     Current Outpatient Medications on File Prior to Encounter   Medication Sig Dispense Refill    cephALEXin (KEFLEX) 500 MG Cap Take 1 Capsule by mouth 2 times a day for 7 days. 14 Capsule 0    Ferrous Sulfate (IRON PO) Take 1 Tablet by mouth every day.      sertraline (ZOLOFT) 50 MG Tab TAKE 1 TABLET BY MOUTH EVERY DAY 90 Tablet 1    clindamycin 1 % Gel Apply 1 g topically 2 times a day. 75 mL 6    acetaminophen (TYLENOL) 500 MG Tab Take 2 Tablets by mouth every 6 hours as needed for Moderate Pain or Mild Pain. Do not exceed 4,000 mg in 24 hours 30 Tablet  "0    docusate sodium 100 MG Cap Take 1 capsule by mouth 2 times a day as needed for Constipation. 60 Capsule 0    ibuprofen (MOTRIN) 600 MG Tab Take 1 Tablet by mouth every 8 hours as needed for Moderate Pain or Mild Pain. 30 Tablet 0     ALLERGIES  Allergies   Allergen Reactions    Pcn [Penicillins] Rash     Rash     PHYSICAL EXAM  VITAL SIGNS:/78   Pulse 74   Temp 36.6 °C (97.8 °F) (Temporal)   Resp 14   Ht 1.626 m (5' 4\")   Wt 79 kg (174 lb 2.6 oz)   LMP 10/22/2024 (Exact Date)   SpO2 98%   Breastfeeding Yes   BMI 29.90 kg/m²       GENERAL: Awake and alert  HEAD: Normocephalic and atraumatic  NECK: Normal range of motion, without meningismus  EYES: Pupils Equal, Round, Reactive to Light, extraocular movements intact, conjunctiva white  ENT: Mucous membranes moist, oropharynx clear  PULMONARY: Normal effort, clear to auscultation  CARDIOVASCULAR: No murmurs, clicks or rubs, peripheral pulses 2+  ABDOMINAL: Soft, non-tender, no guarding or rigidity present, no pulsatile masses  BACK: no midline tenderness, no costovertebral tenderness  NEUROLOGICAL: Grossly non-focal neurological examination, speech normal, gait normal  EXTREMITIES: No edema, normal to inspection  SKIN: Warm and dry.  PSYCHIATRIC: Affect is appropriate    COURSE & MEDICAL DECISION MAKING    ED COURSE:    INTERVENTIONS BY ME:  Medications - No data to display    Response on recheck:    10:34 PM - Patient seen and examined at bedside. This is an evaluation of a 24 y.o. woman who presents to the ED for left flank pain for the last four days. Informed the patient of my plan for reevaluation following consultation with pharmacy.     10:49 PM I discussed the patient's case and the above findings with Jenny Longo who recommended medication with doxycycline.     10:50 PM - The patient was reevaluated at bedside. I informed the patient of my conversation with pharmacy and my plan for discharge with prescription for doxycycline 100 mg PO BID " for 7 days and Flagyl 500 mg PO TID for 7 days. I then informed the patient of my plan for discharge, which includes strict return precautions for any new or worsening symptoms. Patient understands and verbalizes agreement to plan of care. Patient is comfortable going home at this time.       INITIAL ASSESSMENT, COURSE AND PLAN  Care Narrative:   The patient is a 24-year-old female presenting with four days of left flank pain described as cramping, dull, and colicky, with no relief from ibuprofen. She denies dysuria or hematuria and reports normal urination. Additional symptoms include yellow vaginal discharge and a recent positive test for chlamydia. She has been taking prescribed antibiotics and denies fever, chills, or systemic symptoms. Her history includes prior kidney infections and a recent normal CT scan.    Her presentation raises concern for an infectious etiology, likely pelvic inflammatory disease given the positive chlamydia result, vaginal discharge, and reported flank pain. While pyelonephritis or ureterolithiasis were considered, the absence of urinary symptoms, costovertebral tenderness, or systemic findings reduces the likelihood of renal involvement. A subclinical or referred pain component remains a possibility.    The patient was treated empirically with doxycycline and metronidazole for chlamydia and bacterial vaginosis. She was discharged in stable condition with instructions to return for any worsening of symptoms, such as fever, escalating pain, or new systemic signs. Follow-up with her primary care provider and gynecologist is recommended to confirm resolution of infection and re-evaluate her flank pain if persistent.  ADDITIONAL PROBLEM LIST      DISPOSITION AND DISCUSSIONS  Discussion of management with other QHP or appropriate source(s): Jenny Longo    I have discussed management of the patient with the following physicians and DAVIDSON's:  None    Escalation of care considered, and  ultimately not performed:    Barriers to care at this time, including but not limited to:  None known  .     Decision tools and prescription drugs considered including, but not limited to: Antibiotics: doxycycline 100 mg PO BID for 7 days and Flagyl 500 mg PO TID for 7 days. .     The patient will return for new or worsening symptoms and is stable at the time of discharge.    DISPOSITION:  Patient will be discharged home in stable condition.    FOLLOW UP:  No follow-up provider specified.    OUTPATIENT MEDICATIONS:  Current Discharge Medication List        START taking these medications    Details   doxycycline (MONODOX) 100 MG capsule Take 1 Capsule by mouth 2 times a day for 7 days.  Qty: 14 Capsule, Refills: 0    Associated Diagnoses: Chlamydia infection      metroNIDAZOLE (FLAGYL) 500 MG Tab Take 1 Tablet by mouth 3 times a day for 7 days.  Qty: 21 Tablet, Refills: 0    Associated Diagnoses: Bacterial vaginosis            FINAL DIAGNOSIS  1. Chlamydia infection    2. Bacterial vaginosis      Armando ADAME (Raciel), am scribing for, and in the presence of, Elmer French.    Electronically signed by: Armando Pulido (Raciel), 11/16/2024    IElmer personally performed the services described in this documentation, as scribed by Armando Pulido in my presence, and it is both accurate and complete.     Electronically signed by: Elmer French DO ,10:54 PM 11/16/24

## 2024-11-17 NOTE — ED NOTES
Patient ready for discharge. Instructions and RX given to patient. Patient educated on when/if to return to ED and follow-up appts. With PCP. Patient verbalized understanding.

## 2024-11-17 NOTE — ED TRIAGE NOTES
"Chief Complaint   Patient presents with    Flank Pain     Left flank x 4 days. Cramping & dull pain, colicky in nature. 7:10 pain. Been taking ibuprofen without relief. Been seen by primary and in ER for pain.Pt reports she received a positive chlamydia result and needs antibiotics. Reports normal urination. Yellow vaginal discharge.      Pt ambulates from [facility] with steady gait. Skin signs pink, warm, dry. Pt speaking full sentences, A & O x 4. Respirations equal and unlabored. Pt educated on triage process and to alert staff of any changing conditions. Pt returned to the [facility].    /78   Pulse 74   Temp 36.6 °C (97.8 °F) (Temporal)   Resp 14   Ht 1.626 m (5' 4\")   Wt 79 kg (174 lb 2.6 oz)   LMP 10/22/2024 (Exact Date)   SpO2 98%   Breastfeeding Yes   BMI 29.90 kg/m²       "

## 2024-11-17 NOTE — ED NOTES
"Patient ambulates with steady gate to yellow 64. Patient reports \"just needing antibiotics for her positive chlamydia test she got today because she can't wait.\" Hooked up to monitor.   "

## 2024-11-18 ENCOUNTER — APPOINTMENT (OUTPATIENT)
Dept: MEDICAL GROUP | Facility: PHYSICIAN GROUP | Age: 24
End: 2024-11-18
Payer: COMMERCIAL

## 2024-12-04 ENCOUNTER — HOSPITAL ENCOUNTER (OUTPATIENT)
Facility: MEDICAL CENTER | Age: 24
End: 2024-12-04
Payer: COMMERCIAL

## 2024-12-04 ENCOUNTER — OFFICE VISIT (OUTPATIENT)
Dept: MEDICAL GROUP | Facility: PHYSICIAN GROUP | Age: 24
End: 2024-12-04
Payer: COMMERCIAL

## 2024-12-04 VITALS
WEIGHT: 169 LBS | TEMPERATURE: 97.6 F | OXYGEN SATURATION: 96 % | BODY MASS INDEX: 28.85 KG/M2 | HEART RATE: 76 BPM | SYSTOLIC BLOOD PRESSURE: 100 MMHG | RESPIRATION RATE: 20 BRPM | DIASTOLIC BLOOD PRESSURE: 58 MMHG | HEIGHT: 64 IN

## 2024-12-04 DIAGNOSIS — N89.8 VAGINAL DISCHARGE: ICD-10-CM

## 2024-12-04 DIAGNOSIS — B96.89 BACTERIAL VAGINAL INFECTION: ICD-10-CM

## 2024-12-04 DIAGNOSIS — Z86.19 HISTORY OF CHLAMYDIA INFECTION: ICD-10-CM

## 2024-12-04 DIAGNOSIS — N76.0 BACTERIAL VAGINAL INFECTION: ICD-10-CM

## 2024-12-04 PROCEDURE — 87510 GARDNER VAG DNA DIR PROBE: CPT

## 2024-12-04 PROCEDURE — 87480 CANDIDA DNA DIR PROBE: CPT

## 2024-12-04 PROCEDURE — 3078F DIAST BP <80 MM HG: CPT

## 2024-12-04 PROCEDURE — 3074F SYST BP LT 130 MM HG: CPT

## 2024-12-04 PROCEDURE — 99213 OFFICE O/P EST LOW 20 MIN: CPT

## 2024-12-04 PROCEDURE — 87591 N.GONORRHOEAE DNA AMP PROB: CPT

## 2024-12-04 PROCEDURE — 87491 CHLMYD TRACH DNA AMP PROBE: CPT

## 2024-12-04 PROCEDURE — 87660 TRICHOMONAS VAGIN DIR PROBE: CPT

## 2024-12-04 ASSESSMENT — FIBROSIS 4 INDEX: FIB4 SCORE: 0.51

## 2024-12-04 NOTE — PROGRESS NOTES
"Subjective:     Chief Complaint   Patient presents with    Sexually Transmitted Diseases     History of Present Illness  The patient is a 24-year-old established female patient of [facility] presenting for a follow-up on her recent chlamydial infection and bacterial vaginosis. She wishes to be retested as she is still having the same greenish vaginal discharge    She was previously prescribed doxycycline 100 mg twice daily for 7 days and metronidazole 500 mg three times daily for 7 days for a chlamydial infection and bacterial vaginosis. She has completed the antibiotic course but reports persistent discharge. She describes the discharge as green and abnormal, raising concerns about potential antibiotic resistance. She is currently using a gel once daily from her pregnancy when she had BV.  She states that she completed the doxycycline as prescribed but unfortunately lost the metronidazole oral medication after taking only 3-4 doses. She has been using a vaginal cream from her pregnancy as a substitute.    She reports no pain, abdominal pain, hematuria, fevers, or chills. She continues to breastfeed and maintains good hydration. A CT [facility] performed at [facility] showed no abnormalities. She is not currently sexually active and has had no contact with symptomatic individuals since she was diagnosed.    Allergies: Pcn [penicillins]  ROS per HPI  Health Maintenance: Deferred   Objective:     /58 (BP Location: Left arm, Patient Position: Sitting, BP Cuff Size: Adult)   Pulse 76   Temp 36.4 °C (97.6 °F) (Temporal)   Resp 20   Ht 1.626 m (5' 4\")   Wt 76.7 kg (169 lb)   SpO2 96%   Breastfeeding Yes   BMI 29.01 kg/m²  Body mass index is 29.01 kg/m².     Physical Exam  Vitals reviewed.   Constitutional:       General: She is not in acute distress.     Appearance: Normal appearance. She is not ill-appearing.   Cardiovascular:      Rate and Rhythm: Normal rate and regular rhythm.   Pulmonary:      " Effort: Pulmonary effort is normal. No respiratory distress.   Abdominal:      General: There is no distension.      Palpations: There is no mass.      Tenderness: There is no abdominal tenderness. There is no right CVA tenderness or left CVA tenderness.   Skin:     Coloration: Skin is not jaundiced or pale.   Neurological:      General: No focal deficit present.      Mental Status: She is alert and oriented to person, place, and time.   Psychiatric:         Mood and Affect: Mood normal.         Behavior: Behavior normal.         Thought Content: Thought content normal.         Judgment: Judgment normal.        Results for orders placed or performed during the hospital encounter of 11/15/24   URINE CULTURE(NEW)    Collection Time: 11/15/24  9:23 AM    Specimen: Urine   Result Value Ref Range    Significant Indicator NEG     Source UR     Site -     Culture Result Usual urogenital rocky >100,000 cfu/mL    VAGINAL PATHOGENS DNA PANEL    Collection Time: 11/15/24  9:23 AM   Result Value Ref Range    Candida species DNA Probe Negative Negative    Trichamonas vaginalis DNA Probe Negative Negative    Gardnerella vaginalis DNA Probe POSITIVE (A) Negative   Chlamydia/GC, PCR (Urine)    Collection Time: 11/15/24  9:23 AM   Result Value Ref Range    C. trachomatis by PCR POSITIVE (A) Negative    Gc By Dna Probe Negative Negative    Source Urine       Assessment and Plan:     The following treatment plan was discussed through shared decision making with the patient:    1. Bacterial vaginal infection  VAGINAL PATHOGENS DNA PANEL    Chlamydia/GC, PCR (Urine)      2. History of chlamydia infection  VAGINAL PATHOGENS DNA PANEL    Chlamydia/GC, PCR (Urine)      3. Vaginal discharge  VAGINAL PATHOGENS DNA PANEL    Chlamydia/GC, PCR (Urine)        Assessment & Plan  1. Bacterial vaginosis - She reports a green discharge and no pain.  - A vaginal pathogen swab will be performed to check for Gardnerella, yeast and    - A urine specimen  will also be collected to confirm chlamydia infection has cleared.  - Will contact patient via [facility] to let her know the results and further treatment.  - Advised patient to abstain from sexual intercourse until this symptoms are managed and treated.    Return if symptoms worsen or fail to improve.       Please note that this note was created using dictation with voice recognition software. I have made every reasonable attempt to correct obvious errors, but I expect that there are errors of grammar and possibly content that I did not discover before finalizing the note.    AKOSUA Tim  [facility]  [facility]

## 2024-12-05 LAB
C TRACH DNA SPEC QL NAA+PROBE: NEGATIVE
CANDIDA DNA VAG QL PROBE+SIG AMP: NEGATIVE
G VAGINALIS DNA VAG QL PROBE+SIG AMP: NEGATIVE
N GONORRHOEA DNA SPEC QL NAA+PROBE: NEGATIVE
SPECIMEN SOURCE: NORMAL
T VAGINALIS DNA VAG QL PROBE+SIG AMP: NEGATIVE

## 2024-12-24 ENCOUNTER — APPOINTMENT (OUTPATIENT)
Dept: MEDICAL GROUP | Facility: PHYSICIAN GROUP | Age: 24
End: 2024-12-24
Payer: COMMERCIAL

## 2024-12-26 ENCOUNTER — HOSPITAL ENCOUNTER (OUTPATIENT)
Facility: MEDICAL CENTER | Age: 24
End: 2024-12-26
Attending: NURSE PRACTITIONER
Payer: COMMERCIAL

## 2024-12-26 ENCOUNTER — OFFICE VISIT (OUTPATIENT)
Dept: MEDICAL GROUP | Facility: PHYSICIAN GROUP | Age: 24
End: 2024-12-26
Payer: COMMERCIAL

## 2024-12-26 VITALS
BODY MASS INDEX: 29.55 KG/M2 | OXYGEN SATURATION: 97 % | TEMPERATURE: 97.7 F | WEIGHT: 173.1 LBS | SYSTOLIC BLOOD PRESSURE: 100 MMHG | HEART RATE: 74 BPM | HEIGHT: 64 IN | DIASTOLIC BLOOD PRESSURE: 64 MMHG

## 2024-12-26 DIAGNOSIS — N89.8 VAGINAL DISCHARGE: ICD-10-CM

## 2024-12-26 DIAGNOSIS — N89.8 VAGINAL ODOR: ICD-10-CM

## 2024-12-26 PROCEDURE — 87480 CANDIDA DNA DIR PROBE: CPT

## 2024-12-26 PROCEDURE — 87591 N.GONORRHOEAE DNA AMP PROB: CPT

## 2024-12-26 PROCEDURE — 3078F DIAST BP <80 MM HG: CPT | Performed by: NURSE PRACTITIONER

## 2024-12-26 PROCEDURE — 87491 CHLMYD TRACH DNA AMP PROBE: CPT

## 2024-12-26 PROCEDURE — 87660 TRICHOMONAS VAGIN DIR PROBE: CPT

## 2024-12-26 PROCEDURE — 87510 GARDNER VAG DNA DIR PROBE: CPT

## 2024-12-26 PROCEDURE — 99213 OFFICE O/P EST LOW 20 MIN: CPT | Performed by: NURSE PRACTITIONER

## 2024-12-26 PROCEDURE — 3074F SYST BP LT 130 MM HG: CPT | Performed by: NURSE PRACTITIONER

## 2024-12-26 ASSESSMENT — FIBROSIS 4 INDEX: FIB4 SCORE: 0.51

## 2024-12-27 DIAGNOSIS — N89.8 VAGINAL DISCHARGE: ICD-10-CM

## 2024-12-27 DIAGNOSIS — N89.8 VAGINAL ODOR: ICD-10-CM

## 2024-12-28 LAB
C TRACH DNA SPEC QL NAA+PROBE: NEGATIVE
N GONORRHOEA DNA SPEC QL NAA+PROBE: NEGATIVE
SPECIMEN SOURCE: NORMAL

## 2025-01-10 ENCOUNTER — TELEPHONE (OUTPATIENT)
Dept: OBGYN | Facility: CLINIC | Age: 25
End: 2025-01-10
Payer: MEDICAID

## 2025-01-10 NOTE — TELEPHONE ENCOUNTER
Called Pt regarding updating ins information. She going to give her dad a call to see if there been any updates. And either call us back or load it to my chart

## 2025-01-13 ENCOUNTER — APPOINTMENT (OUTPATIENT)
Dept: OBGYN | Facility: CLINIC | Age: 25
End: 2025-01-13
Payer: MEDICAID

## 2025-04-01 ENCOUNTER — HOSPITAL ENCOUNTER (OUTPATIENT)
Dept: LAB | Facility: MEDICAL CENTER | Age: 25
End: 2025-04-01
Attending: OTOLARYNGOLOGY
Payer: MEDICAID

## 2025-04-01 LAB
ANION GAP SERPL CALC-SCNC: 15 MMOL/L (ref 7–16)
BASOPHILS # BLD AUTO: 1 % (ref 0–1.8)
BASOPHILS # BLD: 0.08 K/UL (ref 0–0.12)
BUN SERPL-MCNC: 12 MG/DL (ref 8–22)
CALCIUM SERPL-MCNC: 9.2 MG/DL (ref 8.5–10.5)
CHLORIDE SERPL-SCNC: 105 MMOL/L (ref 96–112)
CO2 SERPL-SCNC: 21 MMOL/L (ref 20–33)
CREAT SERPL-MCNC: 0.7 MG/DL (ref 0.5–1.4)
EOSINOPHIL # BLD AUTO: 0.2 K/UL (ref 0–0.51)
EOSINOPHIL NFR BLD: 2.4 % (ref 0–6.9)
ERYTHROCYTE [DISTWIDTH] IN BLOOD BY AUTOMATED COUNT: 41.9 FL (ref 35.9–50)
GFR SERPLBLD CREATININE-BSD FMLA CKD-EPI: 123 ML/MIN/1.73 M 2
GLUCOSE SERPL-MCNC: 81 MG/DL (ref 65–99)
HCT VFR BLD AUTO: 43.6 % (ref 37–47)
HGB BLD-MCNC: 14.6 G/DL (ref 12–16)
IMM GRANULOCYTES # BLD AUTO: 0.03 K/UL (ref 0–0.11)
IMM GRANULOCYTES NFR BLD AUTO: 0.4 % (ref 0–0.9)
LYMPHOCYTES # BLD AUTO: 2.24 K/UL (ref 1–4.8)
LYMPHOCYTES NFR BLD: 26.7 % (ref 22–41)
MCH RBC QN AUTO: 30.6 PG (ref 27–33)
MCHC RBC AUTO-ENTMCNC: 33.5 G/DL (ref 32.2–35.5)
MCV RBC AUTO: 91.4 FL (ref 81.4–97.8)
MONOCYTES # BLD AUTO: 0.45 K/UL (ref 0–0.85)
MONOCYTES NFR BLD AUTO: 5.4 % (ref 0–13.4)
NEUTROPHILS # BLD AUTO: 5.39 K/UL (ref 1.82–7.42)
NEUTROPHILS NFR BLD: 64.1 % (ref 44–72)
NRBC # BLD AUTO: 0 K/UL
NRBC BLD-RTO: 0 /100 WBC (ref 0–0.2)
PLATELET # BLD AUTO: 235 K/UL (ref 164–446)
PMV BLD AUTO: 11.3 FL (ref 9–12.9)
POTASSIUM SERPL-SCNC: 4.4 MMOL/L (ref 3.6–5.5)
RBC # BLD AUTO: 4.77 M/UL (ref 4.2–5.4)
SODIUM SERPL-SCNC: 141 MMOL/L (ref 135–145)
WBC # BLD AUTO: 8.4 K/UL (ref 4.8–10.8)

## 2025-04-01 PROCEDURE — 85025 COMPLETE CBC W/AUTO DIFF WBC: CPT

## 2025-04-01 PROCEDURE — 80048 BASIC METABOLIC PNL TOTAL CA: CPT

## 2025-04-01 PROCEDURE — 85610 PROTHROMBIN TIME: CPT

## 2025-04-01 PROCEDURE — 85730 THROMBOPLASTIN TIME PARTIAL: CPT

## 2025-04-01 PROCEDURE — 36415 COLL VENOUS BLD VENIPUNCTURE: CPT

## 2025-04-02 LAB
APTT PPP: 30.9 SEC (ref 24.7–36)
INR PPP: 0.9 (ref 0.87–1.13)
PROTHROMBIN TIME: 12.1 SEC (ref 12–14.6)

## 2025-04-23 ENCOUNTER — HOSPITAL ENCOUNTER (OUTPATIENT)
Facility: MEDICAL CENTER | Age: 25
End: 2025-04-23
Payer: MEDICAID

## 2025-04-23 ENCOUNTER — OFFICE VISIT (OUTPATIENT)
Dept: URGENT CARE | Facility: PHYSICIAN GROUP | Age: 25
End: 2025-04-23
Payer: MEDICAID

## 2025-04-23 VITALS
SYSTOLIC BLOOD PRESSURE: 112 MMHG | HEIGHT: 65 IN | DIASTOLIC BLOOD PRESSURE: 70 MMHG | HEART RATE: 84 BPM | BODY MASS INDEX: 28.12 KG/M2 | WEIGHT: 168.8 LBS | RESPIRATION RATE: 16 BRPM | OXYGEN SATURATION: 98 % | TEMPERATURE: 97.2 F

## 2025-04-23 DIAGNOSIS — Z72.51 HIGH RISK SEXUAL BEHAVIOR, UNSPECIFIED TYPE: ICD-10-CM

## 2025-04-23 DIAGNOSIS — R39.9 UTI SYMPTOMS: Primary | ICD-10-CM

## 2025-04-23 LAB
APPEARANCE UR: CLEAR
BILIRUB UR STRIP-MCNC: NEGATIVE MG/DL
COLOR UR AUTO: NORMAL
GLUCOSE UR STRIP.AUTO-MCNC: NEGATIVE MG/DL
KETONES UR STRIP.AUTO-MCNC: NEGATIVE MG/DL
LEUKOCYTE ESTERASE UR QL STRIP.AUTO: NEGATIVE
NITRITE UR QL STRIP.AUTO: NEGATIVE
PH UR STRIP.AUTO: 7 [PH] (ref 5–8)
POCT INT CON NEG: NEGATIVE
POCT INT CON POS: POSITIVE
POCT URINE PREGNANCY TEST: NEGATIVE
PROT UR QL STRIP: NEGATIVE MG/DL
RBC UR QL AUTO: NEGATIVE
SP GR UR STRIP.AUTO: 1.02
UROBILINOGEN UR STRIP-MCNC: NORMAL MG/DL

## 2025-04-23 PROCEDURE — 87480 CANDIDA DNA DIR PROBE: CPT

## 2025-04-23 PROCEDURE — 3074F SYST BP LT 130 MM HG: CPT

## 2025-04-23 PROCEDURE — 87510 GARDNER VAG DNA DIR PROBE: CPT

## 2025-04-23 PROCEDURE — 3078F DIAST BP <80 MM HG: CPT

## 2025-04-23 PROCEDURE — 87491 CHLMYD TRACH DNA AMP PROBE: CPT

## 2025-04-23 PROCEDURE — 99214 OFFICE O/P EST MOD 30 MIN: CPT

## 2025-04-23 PROCEDURE — 81025 URINE PREGNANCY TEST: CPT

## 2025-04-23 PROCEDURE — 81002 URINALYSIS NONAUTO W/O SCOPE: CPT

## 2025-04-23 PROCEDURE — 87591 N.GONORRHOEAE DNA AMP PROB: CPT

## 2025-04-23 PROCEDURE — 87660 TRICHOMONAS VAGIN DIR PROBE: CPT

## 2025-04-23 ASSESSMENT — ENCOUNTER SYMPTOMS
EYE REDNESS: 0
SORE THROAT: 0
FEVER: 0
TINGLING: 0
VOMITING: 0
NAUSEA: 0
HEARTBURN: 0
PALPITATIONS: 0
MYALGIAS: 0
STRIDOR: 0
NECK PAIN: 0
BACK PAIN: 0
SHORTNESS OF BREATH: 0
EYE DISCHARGE: 0
CHILLS: 0
FOCAL WEAKNESS: 0
HEADACHES: 0
DIZZINESS: 0
WEAKNESS: 0
COUGH: 0
ABDOMINAL PAIN: 1
CONSTIPATION: 0
DIARRHEA: 0

## 2025-04-23 ASSESSMENT — VISUAL ACUITY: OU: 1

## 2025-04-23 ASSESSMENT — FIBROSIS 4 INDEX: FIB4 SCORE: 0.45

## 2025-04-23 NOTE — PROGRESS NOTES
I was present with a nurse practitioner student who participated in the documentation of this note. I personally saw and evaluated the patient and performed my own history and examination. I discussed the case with the nurse practitioner student. I have reviewed, verified, and revised the note as necessary and agree with the content and plan as written by the nurse practitioner student.       Electronically signed by AKOSUA Brush     Caitlyn Diamond is a 24 y.o. female who presents with Sexually Transmitted Diseases (Std testing wanted, no sx's )    Sexually Transmitted Diseases  Associated symptoms include abdominal pain. Pertinent negatives include no chest pain, chills, coughing, fever, headaches, myalgias, nausea, neck pain, rash, sore throat, vomiting or weakness.     HPI:   Caitlyn reports to urgent care requesting STD testing after having unprotected vaginal sex 4 days prior with mild abdominal cramping x1 day. She has not noticed changes in vaginal discharge, rash, or changes with urination. Reports history of chlamydia and BV. Notes that when she had chlamydia she had significant back pain. She was not notified of having been exposed to an illness and did not notice signs of illness during the event. She has irregular menstrual cycles due to breast feeding, reports LNMP was 2 months ago.    Review of Systems   Constitutional:  Negative for chills, fever and malaise/fatigue.   HENT:  Negative for sore throat.    Eyes:  Negative for discharge and redness.   Respiratory:  Negative for cough, shortness of breath and stridor.    Cardiovascular:  Negative for chest pain and palpitations.   Gastrointestinal:  Positive for abdominal pain. Negative for constipation, diarrhea, heartburn, nausea and vomiting.   Genitourinary:  Negative for dysuria, frequency, hematuria and urgency.   Musculoskeletal:  Negative for back pain, myalgias and neck pain.   Skin:  Negative for  "itching and rash.   Neurological:  Negative for dizziness, tingling, focal weakness, weakness and headaches.        Allergies   Allergen Reactions    Pcn [Penicillins] Rash     Rash     Past Medical History:   Diagnosis Date    Depression 6/2/2020      Past Surgical History:   Procedure Laterality Date    LIPOSUCTION      MAMMOPLASTY AUGMENTATION      OTHER      Nose surgery      Family History   Problem Relation Age of Onset    Breast Cancer Maternal Grandmother     Cancer Maternal Grandmother     Cancer Maternal Grandfather       Social History     Tobacco Use    Smoking status: Never    Smokeless tobacco: Never   Vaping Use    Vaping status: Never Used   Substance Use Topics    Alcohol use: Yes     Comment: social    Drug use: Not Currently     Types: Inhaled, Marijuana     Comment: Marijuana in past 2 years     Medications, Allergies, and current problem list reviewed today in Epic.      Objective     /70   Pulse 84   Temp 36.2 °C (97.2 °F) (Temporal)   Resp 16   Ht 1.651 m (5' 5\")   Wt 76.6 kg (168 lb 12.8 oz)   SpO2 98%   BMI 28.09 kg/m²      Physical Exam  Vitals reviewed.   Constitutional:       General: She is not in acute distress.     Appearance: Normal appearance. She is normal weight. She is not ill-appearing, toxic-appearing or diaphoretic.   HENT:      Head: Normocephalic and atraumatic.      Nose: Nose normal.      Mouth/Throat:      Mouth: Mucous membranes are moist.   Eyes:      General: Vision grossly intact. Gaze aligned appropriately. No visual field deficit.     Extraocular Movements: Extraocular movements intact.      Conjunctiva/sclera: Conjunctivae normal.      Pupils: Pupils are equal, round, and reactive to light.   Cardiovascular:      Rate and Rhythm: Normal rate and regular rhythm.      Pulses: Normal pulses.      Heart sounds: Normal heart sounds.   Pulmonary:      Effort: Pulmonary effort is normal. No tachypnea, accessory muscle usage, prolonged expiration, respiratory " distress or retractions.      Breath sounds: Normal breath sounds. No stridor, decreased air movement or transmitted upper airway sounds. No wheezing, rhonchi or rales.   Abdominal:      General: Abdomen is flat. Bowel sounds are normal. There is no distension.      Palpations: Abdomen is soft. There is no mass.      Tenderness: There is no abdominal tenderness. There is no right CVA tenderness, left CVA tenderness, guarding or rebound.      Hernia: No hernia is present.   Musculoskeletal:         General: Normal range of motion.      Cervical back: Full passive range of motion without pain, normal range of motion and neck supple. No rigidity. Normal range of motion.   Skin:     General: Skin is warm and dry.      Findings: No rash.   Neurological:      General: No focal deficit present.      Mental Status: She is alert and oriented to person, place, and time.      Sensory: Sensation is intact.      Motor: Motor function is intact.      Coordination: Coordination is intact.      Gait: Gait is intact.   Psychiatric:         Mood and Affect: Mood normal.         Behavior: Behavior normal.         Thought Content: Thought content normal.       Results for orders placed or performed in visit on 04/23/25   POCT Urinalysis    Collection Time: 04/23/25 12:34 PM   Result Value Ref Range    POC Color Dark Yellow Negative    POC Appearance Clear Negative    POC Glucose Negative Negative mg/dL    POC Bilirubin Negative Negative mg/dL    POC Ketones Negative Negative mg/dL    POC Specific Gravity 1.025 <1.005 - >1.030    POC Blood Negative Negative    POC Urine PH 7.0 5.0 - 8.0    POC Protein Negative Negative mg/dL    POC Urobiligen 0.2 E.U./dL Negative (0.2) mg/dL    POC Nitrites Negative Negative    POC Leukocyte Esterase Negative Negative   POCT Pregnancy    Collection Time: 04/23/25 12:34 PM   Result Value Ref Range    POC Urine Pregnancy Test Negative     Internal Control Positive Positive     Internal Control Negative  Negative      Assessment & Plan    1. UTI symptoms (Primary)   - POCT Urinalysis  - POCT Pregnancy    2. High risk sexual behavior, unspecified type   - Chlamydia/GC, PCR (Genital/Anal swab); Future  - VAGINAL PATHOGENS DNA PANEL; Future       MDM/Comments:   Patient presenting for STI screening with no known exposure. Denies any current symptoms or concerns other than mild lower abdominal cramping which may be related to menstruation.   Will obtain vaginal pathogen and Gonorrhea/Chlamydia testing and notify patient of results.     Illness progression and alarm symptoms discussed with patient, emphasizing low threshold for returning to clinic/emergency department for worsening symptoms. Patient is agreeable to the plan and verbalizes understanding, and will follow up if warranted.       Differential diagnosis, natural history, supportive care, and indications for immediate follow-up discussed.      Follow-up as needed if symptoms worsen or fail to improve to PCP, Urgent care or Emergency Room.       I personally reviewed prior external notes and test results pertinent to today's visit.  I have independently reviewed and interpreted all diagnostics ordered during this urgent care visit.                                Electronically signed by AKOSUA Brush

## 2025-04-24 ENCOUNTER — RESULTS FOLLOW-UP (OUTPATIENT)
Dept: URGENT CARE | Facility: CLINIC | Age: 25
End: 2025-04-24

## 2025-04-24 LAB
C TRACH DNA GENITAL QL NAA+PROBE: NEGATIVE
CANDIDA DNA VAG QL PROBE+SIG AMP: NEGATIVE
G VAGINALIS DNA VAG QL PROBE+SIG AMP: NEGATIVE
N GONORRHOEA DNA GENITAL QL NAA+PROBE: NEGATIVE
SPECIMEN SOURCE: NORMAL
T VAGINALIS DNA VAG QL PROBE+SIG AMP: NEGATIVE

## 2025-06-16 ENCOUNTER — RESULTS FOLLOW-UP (OUTPATIENT)
Dept: MEDICAL GROUP | Facility: IMAGING CENTER | Age: 25
End: 2025-06-16

## 2025-06-16 ENCOUNTER — OFFICE VISIT (OUTPATIENT)
Dept: MEDICAL GROUP | Facility: IMAGING CENTER | Age: 25
End: 2025-06-16
Payer: COMMERCIAL

## 2025-06-16 VITALS
HEIGHT: 65 IN | OXYGEN SATURATION: 96 % | TEMPERATURE: 98.2 F | WEIGHT: 169.6 LBS | RESPIRATION RATE: 16 BRPM | SYSTOLIC BLOOD PRESSURE: 112 MMHG | DIASTOLIC BLOOD PRESSURE: 58 MMHG | HEART RATE: 81 BPM | BODY MASS INDEX: 28.26 KG/M2

## 2025-06-16 DIAGNOSIS — N91.2 AMENORRHEA: ICD-10-CM

## 2025-06-16 DIAGNOSIS — R39.9 URINARY SYMPTOM OR SIGN: ICD-10-CM

## 2025-06-16 DIAGNOSIS — R20.0 SENSORY LOSS: Primary | ICD-10-CM

## 2025-06-16 DIAGNOSIS — Z00.00 WELLNESS EXAMINATION: ICD-10-CM

## 2025-06-16 DIAGNOSIS — R11.0 NAUSEA: ICD-10-CM

## 2025-06-16 LAB
APPEARANCE UR: NORMAL
BILIRUB UR STRIP-MCNC: NEGATIVE MG/DL
COLOR UR AUTO: YELLOW
GLUCOSE UR STRIP.AUTO-MCNC: NEGATIVE MG/DL
KETONES UR STRIP.AUTO-MCNC: NEGATIVE MG/DL
LEUKOCYTE ESTERASE UR QL STRIP.AUTO: NEGATIVE
NITRITE UR QL STRIP.AUTO: NEGATIVE
PH UR STRIP.AUTO: 6.5 [PH] (ref 5–8)
POCT INT CON NEG: NEGATIVE
POCT INT CON POS: POSITIVE
POCT URINE PREGNANCY TEST: NEGATIVE
PROT UR QL STRIP: NEGATIVE MG/DL
RBC UR QL AUTO: NEGATIVE
SP GR UR STRIP.AUTO: 1.02
UROBILINOGEN UR STRIP-MCNC: 0.2 MG/DL

## 2025-06-16 PROCEDURE — 99213 OFFICE O/P EST LOW 20 MIN: CPT

## 2025-06-16 PROCEDURE — 3074F SYST BP LT 130 MM HG: CPT

## 2025-06-16 PROCEDURE — 81025 URINE PREGNANCY TEST: CPT

## 2025-06-16 PROCEDURE — 3078F DIAST BP <80 MM HG: CPT

## 2025-06-16 PROCEDURE — 81002 URINALYSIS NONAUTO W/O SCOPE: CPT

## 2025-06-16 PROCEDURE — 1125F AMNT PAIN NOTED PAIN PRSNT: CPT

## 2025-06-16 ASSESSMENT — PAIN SCALES - GENERAL: PAINLEVEL_OUTOF10: 5=MODERATE PAIN

## 2025-06-16 ASSESSMENT — PATIENT HEALTH QUESTIONNAIRE - PHQ9
7. TROUBLE CONCENTRATING ON THINGS, SUCH AS READING THE NEWSPAPER OR WATCHING TELEVISION: NOT AT ALL
6. FEELING BAD ABOUT YOURSELF - OR THAT YOU ARE A FAILURE OR HAVE LET YOURSELF OR YOUR FAMILY DOWN: NOT AL ALL
4. FEELING TIRED OR HAVING LITTLE ENERGY: NOT AT ALL
5. POOR APPETITE OR OVEREATING: NOT AT ALL
9. THOUGHTS THAT YOU WOULD BE BETTER OFF DEAD, OR OF HURTING YOURSELF: NOT AT ALL
SUM OF ALL RESPONSES TO PHQ QUESTIONS 1-9: 0
3. TROUBLE FALLING OR STAYING ASLEEP OR SLEEPING TOO MUCH: NOT AT ALL
SUM OF ALL RESPONSES TO PHQ9 QUESTIONS 1 AND 2: 0
1. LITTLE INTEREST OR PLEASURE IN DOING THINGS: NOT AT ALL
2. FEELING DOWN, DEPRESSED, IRRITABLE, OR HOPELESS: NOT AT ALL
8. MOVING OR SPEAKING SO SLOWLY THAT OTHER PEOPLE COULD HAVE NOTICED. OR THE OPPOSITE, BEING SO FIGETY OR RESTLESS THAT YOU HAVE BEEN MOVING AROUND A LOT MORE THAN USUAL: NOT AT ALL

## 2025-06-16 ASSESSMENT — FIBROSIS 4 INDEX: FIB4 SCORE: 0.45

## 2025-06-16 NOTE — PROGRESS NOTES
"Subjective:     CC:   Chief Complaint   Patient presents with    Other     Feeling nauseas and light headed. [place] like a seizure happened, on Friday Afternoon last week. Hearing went away and vision went away.       HPI:   Caitlyn presents today to discuss:    Seizure type episode  Acute on chronic condition. Pt reports having \"absence seizure episodes\" since she was 13 years old. However, she admits that she has not had any work up to confirm this diagnosis.   She reports having a total of 3 episodes since age 13. Most recent episode occurring on Friday.  She reports her episode occurred while at the gym. She developed symptoms of \"aura\" prior to her episode. She states \"feeling weird, like something was off then she felt nauseous, light headed, then lost her vision, hearing, and senses for a minute. She was not able to move or control her body but remain conscious the whole time.\"   She had not had any repeat of this episode; however, she continues to have lingering nausea w/o emesis.   She denies pregnancy; however, she had not had her menses since January due to her breast feeding.       Past Medical History[1]  Family History   Problem Relation Age of Onset    Breast Cancer Maternal Grandmother     Cancer Maternal Grandmother     Cancer Maternal Grandfather      Past Surgical History[2]  Social History[3]  Social History     Social History Narrative    Not on file     Current Medications and Prescriptions Ordered in Epic[4]  Pcn [penicillins]    ROS: see hpi  Gen: no fevers/chills  Pulm: no sob, no cough  CV: no chest pain, no palpitations, no edema  GI: no nausea/vomiting, no diarrhea  Skin: no rash    Objective:   Exam:  /58 (BP Location: Left arm, Patient Position: Sitting, BP Cuff Size: Adult)   Pulse 81   Temp 36.8 °C (98.2 °F) (Temporal)   Resp 16   Ht 1.651 m (5' 5\")   Wt 76.9 kg (169 lb 9.6 oz)   SpO2 96%   Breastfeeding Yes   BMI 28.22 kg/m²    Body mass index is 28.22 " kg/m².    Gen: Alert and oriented, No apparent distress.  HEENT: Head atraumatic, normocephalic. Pupils equal and round.  Neck: Neck is supple without lymphadenopathy.   Lungs: Normal effort, CTA bilaterally, no wheezes, rhonchi, or rales  CV: Regular rate and rhythm. No murmurs, rubs, or gallops.  ABD: +BS. Non-tender, non-distended. No rebound, rigidity, or guarding.  Ext: No clubbing, cyanosis, edema.    Assessment & Plan:     24 y.o. female with the following -     1. Sensory loss  Acute on chronic, etiology unknown. Pt is concerned about absence seizures, unlikely. Could be psychosomatic-stress induced, vs dehydration, vs vasovagal? VSS. Neuro examination in [facility]. No s/s of deficits to warrant imaging.   Will order labs.   If symptoms reoccurs, will refer to neurology and recommend to go to ED for immediate care.    - CBC WITH DIFFERENTIAL; Future  - Comp Metabolic Panel; Future  - Lipid Profile; Future  - TSH WITH REFLEX TO FT4; Future  - VITAMIN D,25 HYDROXY (DEFICIENCY); Future  - HEMOGLOBIN A1C; Future  - Referral to Neurology    2. Nausea   Ongoing issue. Pregnancy negative. UTI negative.     - CBC WITH DIFFERENTIAL; Future  - Comp Metabolic Panel; Future  - Lipid Profile; Future  - TSH WITH REFLEX TO FT4; Future  - VITAMIN D,25 HYDROXY (DEFICIENCY); Future  - HEMOGLOBIN A1C; Future  - POCT PREGNANCY  - POCT Urinalysis    3. Amenorrhea  Poct negative. Likely from breastfeeding.    - POCT PREGNANCY  - POCT Urinalysis    4. Urinary symptom or sign  Negative UTI    - POCT Urinalysis    5. Wellness examination  PMH/PSH/FH/Social history reviewed. Medication reconciled. Vaccinations discussed. Previous records and labs reviewed. Discussed age appropriate anticipatory guidance.  Will screen for anemia, thyroid disorder, metabolic disorder, cardiovascular disease, diabetes, and vitamin deficiency. Will order labs.    - CBC WITH DIFFERENTIAL; Future  - Comp Metabolic Panel; Future  - Lipid Profile; Future  - TSH  WITH REFLEX TO FT4; Future  - VITAMIN D,25 HYDROXY (DEFICIENCY); Future  - HEMOGLOBIN A1C; Future    Return if symptoms worsen or fail to improve.    SAMPSON Izaguirre   [facility]    Medical Decision Making/Course:  In the course of preparing for this visit with review of the pertinent past medical history, recent and past clinic visits, current medications, and performing chart, immunization, medical history and medication reconciliation, and in the further course of obtaining the current history pertinent to the clinic visit today, performing an exam and evaluation, ordering and independently evaluating labs, tests, and/or procedures, prescribing any recommended new medications as noted above, providing any pertinent counseling and education and recommending further coordination of care. This was discussed with patient in a shared-decision making conversation, and they understand and agreed with plan of care.     Please note that this dictation was created using voice recognition software. I have made every reasonable attempt to correct obvious errors, but I expect that there are errors of grammar and possibly content that I did not discover before finalizing the note.         [1]   Past Medical History:  Diagnosis Date    Depression 6/2/2020   [2]   Past Surgical History:  Procedure Laterality Date    SEPTOPLASTY Bilateral 04/09/2025    LIPOSUCTION      MAMMOPLASTY AUGMENTATION      OTHER      Nose surgery   [3]   Social History  Tobacco Use    Smoking status: Never    Smokeless tobacco: Never   Vaping Use    Vaping status: Never Used   Substance Use Topics    Alcohol use: Not Currently     Comment: social    Drug use: Not Currently     Types: Inhaled, Marijuana     Comment: Marijuana in past 2 years   [4]   No current [facility]-ordered outpatient medications on file.     No current [facility]-ordered facility-administered medications on file.

## 2025-06-23 ENCOUNTER — HOSPITAL ENCOUNTER (EMERGENCY)
Facility: MEDICAL CENTER | Age: 25
End: 2025-06-23
Payer: COMMERCIAL

## 2025-06-23 VITALS
DIASTOLIC BLOOD PRESSURE: 70 MMHG | WEIGHT: 169.09 LBS | BODY MASS INDEX: 28.17 KG/M2 | OXYGEN SATURATION: 95 % | SYSTOLIC BLOOD PRESSURE: 124 MMHG | RESPIRATION RATE: 16 BRPM | TEMPERATURE: 97.4 F | HEIGHT: 65 IN | HEART RATE: 85 BPM

## 2025-06-23 LAB — EKG IMPRESSION: NORMAL

## 2025-06-23 PROCEDURE — 93005 ELECTROCARDIOGRAM TRACING: CPT | Mod: TC

## 2025-06-23 PROCEDURE — 302449 STATCHG TRIAGE ONLY (STATISTIC)

## 2025-06-23 ASSESSMENT — FIBROSIS 4 INDEX: FIB4 SCORE: 0.45

## 2025-06-23 ASSESSMENT — PAIN DESCRIPTION - PAIN TYPE: TYPE: ACUTE PAIN

## 2025-06-24 NOTE — ED TRIAGE NOTES
"  Chief Complaint   Patient presents with    Seizure     Possible seizure today. Pt states she was supposed to get an EEG ASAP. Pt states she feels funny and weird. Her body is tingling and blurry eye sight. All symptoms started after she made dinner.         25 yo  female to triage for above complaint.     Pt is alert and orientated, speaking full sentences, follows commands and responds appropriately  to questions.     Pt amb to triage with a steady gait for above complaint. Pt reports EKG room. Pt is alert and oriented, speaking in full sentences, follows commands, and responds appropriately to questions. Not in any apparent distress. Respirations are even and unlabored.   Pt placed in line for blood draw. Pt educated on triage process. Pt encouraged to alert staff for any changes.       /70   Pulse 85   Temp 36.3 °C (97.4 °F) (Temporal)   Resp 16   Ht 1.651 m (5' 5\")   Wt 76.7 kg (169 lb 1.5 oz)   LMP 06/23/2025   SpO2 95%   BMI 28.14 kg/m²       Past Medical History:   Diagnosis Date    Depression 6/2/2020       "

## 2025-06-24 NOTE — ED NOTES
Caitlyn Diamond expresses desire to leave. Risks in leaving ED before treatment given and diagnostics completed discussed with patient. EP completed AMA form and patient  signed form.

## 2025-06-25 ENCOUNTER — HOSPITAL ENCOUNTER (OUTPATIENT)
Dept: LAB | Facility: MEDICAL CENTER | Age: 25
End: 2025-06-25
Payer: COMMERCIAL

## 2025-06-25 DIAGNOSIS — Z00.00 WELLNESS EXAMINATION: ICD-10-CM

## 2025-06-25 DIAGNOSIS — R20.0 SENSORY LOSS: ICD-10-CM

## 2025-06-25 DIAGNOSIS — R11.0 NAUSEA: ICD-10-CM

## 2025-06-25 LAB
25(OH)D3 SERPL-MCNC: 28 NG/ML (ref 30–100)
ALBUMIN SERPL BCP-MCNC: 4.2 G/DL (ref 3.2–4.9)
ALBUMIN/GLOB SERPL: 1.5 G/DL
ALP SERPL-CCNC: 72 U/L (ref 30–99)
ALT SERPL-CCNC: 9 U/L (ref 2–50)
ANION GAP SERPL CALC-SCNC: 11 MMOL/L (ref 7–16)
AST SERPL-CCNC: 15 U/L (ref 12–45)
BASOPHILS # BLD AUTO: 1.1 % (ref 0–1.8)
BASOPHILS # BLD: 0.09 K/UL (ref 0–0.12)
BILIRUB SERPL-MCNC: 0.7 MG/DL (ref 0.1–1.5)
BUN SERPL-MCNC: 18 MG/DL (ref 8–22)
CALCIUM ALBUM COR SERPL-MCNC: 8.6 MG/DL (ref 8.5–10.5)
CALCIUM SERPL-MCNC: 8.8 MG/DL (ref 8.5–10.5)
CHLORIDE SERPL-SCNC: 105 MMOL/L (ref 96–112)
CHOLEST SERPL-MCNC: 147 MG/DL (ref 100–199)
CO2 SERPL-SCNC: 25 MMOL/L (ref 20–33)
CREAT SERPL-MCNC: 0.77 MG/DL (ref 0.5–1.4)
EOSINOPHIL # BLD AUTO: 0.21 K/UL (ref 0–0.51)
EOSINOPHIL NFR BLD: 2.5 % (ref 0–6.9)
ERYTHROCYTE [DISTWIDTH] IN BLOOD BY AUTOMATED COUNT: 42.4 FL (ref 35.9–50)
EST. AVERAGE GLUCOSE BLD GHB EST-MCNC: 103 MG/DL
GFR SERPLBLD CREATININE-BSD FMLA CKD-EPI: 109 ML/MIN/1.73 M 2
GLOBULIN SER CALC-MCNC: 2.8 G/DL (ref 1.9–3.5)
GLUCOSE SERPL-MCNC: 92 MG/DL (ref 65–99)
HBA1C MFR BLD: 5.2 % (ref 4–5.6)
HCT VFR BLD AUTO: 46 % (ref 37–47)
HDLC SERPL-MCNC: 39 MG/DL
HGB BLD-MCNC: 14.5 G/DL (ref 12–16)
IMM GRANULOCYTES # BLD AUTO: 0.02 K/UL (ref 0–0.11)
IMM GRANULOCYTES NFR BLD AUTO: 0.2 % (ref 0–0.9)
LDLC SERPL CALC-MCNC: 96 MG/DL
LYMPHOCYTES # BLD AUTO: 2.42 K/UL (ref 1–4.8)
LYMPHOCYTES NFR BLD: 28.9 % (ref 22–41)
MCH RBC QN AUTO: 29.4 PG (ref 27–33)
MCHC RBC AUTO-ENTMCNC: 31.5 G/DL (ref 32.2–35.5)
MCV RBC AUTO: 93.1 FL (ref 81.4–97.8)
MONOCYTES # BLD AUTO: 0.48 K/UL (ref 0–0.85)
MONOCYTES NFR BLD AUTO: 5.7 % (ref 0–13.4)
NEUTROPHILS # BLD AUTO: 5.15 K/UL (ref 1.82–7.42)
NEUTROPHILS NFR BLD: 61.6 % (ref 44–72)
NRBC # BLD AUTO: 0 K/UL
NRBC BLD-RTO: 0 /100 WBC (ref 0–0.2)
PLATELET # BLD AUTO: 255 K/UL (ref 164–446)
PMV BLD AUTO: 10.7 FL (ref 9–12.9)
POTASSIUM SERPL-SCNC: 4.2 MMOL/L (ref 3.6–5.5)
PROT SERPL-MCNC: 7 G/DL (ref 6–8.2)
RBC # BLD AUTO: 4.94 M/UL (ref 4.2–5.4)
SODIUM SERPL-SCNC: 141 MMOL/L (ref 135–145)
TRIGL SERPL-MCNC: 60 MG/DL (ref 0–149)
TSH SERPL DL<=0.005 MIU/L-ACNC: 1.41 UIU/ML (ref 0.38–5.33)
WBC # BLD AUTO: 8.4 K/UL (ref 4.8–10.8)

## 2025-06-25 PROCEDURE — 82306 VITAMIN D 25 HYDROXY: CPT

## 2025-06-25 PROCEDURE — 36415 COLL VENOUS BLD VENIPUNCTURE: CPT

## 2025-06-25 PROCEDURE — 80061 LIPID PANEL: CPT

## 2025-06-25 PROCEDURE — 84443 ASSAY THYROID STIM HORMONE: CPT

## 2025-06-25 PROCEDURE — 83036 HEMOGLOBIN GLYCOSYLATED A1C: CPT

## 2025-06-25 PROCEDURE — 80053 COMPREHEN METABOLIC PANEL: CPT

## 2025-06-25 PROCEDURE — 85025 COMPLETE CBC W/AUTO DIFF WBC: CPT

## 2025-07-16 ENCOUNTER — HOSPITAL ENCOUNTER (OUTPATIENT)
Facility: MEDICAL CENTER | Age: 25
End: 2025-07-16
Payer: COMMERCIAL

## 2025-07-16 ENCOUNTER — OFFICE VISIT (OUTPATIENT)
Dept: MEDICAL GROUP | Facility: IMAGING CENTER | Age: 25
End: 2025-07-16
Payer: COMMERCIAL

## 2025-07-16 VITALS
DIASTOLIC BLOOD PRESSURE: 60 MMHG | HEART RATE: 71 BPM | HEIGHT: 65 IN | WEIGHT: 167 LBS | TEMPERATURE: 98.1 F | SYSTOLIC BLOOD PRESSURE: 110 MMHG | BODY MASS INDEX: 27.82 KG/M2 | OXYGEN SATURATION: 99 % | RESPIRATION RATE: 17 BRPM

## 2025-07-16 DIAGNOSIS — N89.8 VAGINAL DISCHARGE: Primary | ICD-10-CM

## 2025-07-16 DIAGNOSIS — Z11.3 SCREENING EXAMINATION FOR SEXUALLY TRANSMITTED DISEASE: ICD-10-CM

## 2025-07-16 DIAGNOSIS — N89.8 VAGINAL DISCHARGE: ICD-10-CM

## 2025-07-16 PROCEDURE — 87491 CHLMYD TRACH DNA AMP PROBE: CPT

## 2025-07-16 PROCEDURE — 99213 OFFICE O/P EST LOW 20 MIN: CPT

## 2025-07-16 PROCEDURE — 87510 GARDNER VAG DNA DIR PROBE: CPT

## 2025-07-16 PROCEDURE — 3078F DIAST BP <80 MM HG: CPT

## 2025-07-16 PROCEDURE — 87480 CANDIDA DNA DIR PROBE: CPT

## 2025-07-16 PROCEDURE — 87591 N.GONORRHOEAE DNA AMP PROB: CPT

## 2025-07-16 PROCEDURE — 3074F SYST BP LT 130 MM HG: CPT

## 2025-07-16 PROCEDURE — 87660 TRICHOMONAS VAGIN DIR PROBE: CPT

## 2025-07-16 PROCEDURE — 1126F AMNT PAIN NOTED NONE PRSNT: CPT

## 2025-07-16 ASSESSMENT — PAIN SCALES - GENERAL: PAINLEVEL_OUTOF10: NO PAIN

## 2025-07-16 ASSESSMENT — FIBROSIS 4 INDEX: FIB4 SCORE: 0.49

## 2025-07-16 NOTE — PROGRESS NOTES
"Subjective:     CC:   Chief Complaint   Patient presents with    Requesting Labs     STD screening        HPI:   Caitlyn presents today to discuss:    Patient reports developing copious green vaginal discharge 2 to 3 days ago.  Her last sexual encounter with a male partner was roughly 2 to 3 weeks ago.  She reports wearing protection.  However, she is requesting STD testing.  She denies fevers, urinary symptoms, vaginal pruritus, pelvic pain, foul smelling discharge, vaginal lesions.       Past Medical History[1]  Family History   Problem Relation Age of Onset    Breast Cancer Maternal Grandmother     Cancer Maternal Grandmother     Cancer Maternal Grandfather      Past Surgical History[2]  Social History[3]  Social History     Social History Narrative    Not on file     Current Medications and Prescriptions Ordered in Epic[4]  Pcn [penicillins]    ROS: see hpi  Gen: no fevers/chills  Pulm: no sob, no cough  CV: no chest pain, no palpitations, no edema  GI: no nausea/vomiting, no diarrhea  Skin: no rash    Objective:   Exam:  /60 (BP Location: Left arm, Patient Position: Sitting, BP Cuff Size: Adult)   Pulse 71   Temp 36.7 °C (98.1 °F) (Temporal)   Resp 17   Ht 1.651 m (5' 5\")   Wt 75.8 kg (167 lb)   LMP 06/23/2025 (Within Days)   SpO2 99%   Breastfeeding Yes   BMI 27.79 kg/m²    Body mass index is 27.79 kg/m².    Gen: Alert and oriented, No apparent distress.  HEENT: Head atraumatic, normocephalic. Pupils equal and round.  Neck: Neck is supple without lymphadenopathy.   Lungs: Normal effort, CTA bilaterally, no wheezes, rhonchi, or rales  CV: Regular rate and rhythm. No murmurs, rubs, or gallops.  ABD: +BS. Non-tender, non-distended. No rebound, rigidity, or guarding.  Ext: No clubbing, cyanosis, edema.    Assessment & Plan:     24 y.o. female with the following -     1. Vaginal discharge (Primary)  New issue, etiology unknown. Suspect BV vs gonorrhea/chlamydia. Will order testing to confirm. "   Recommend to refrain from sexual intercourse until results have come back and appropriately treated.   Safe sex practices discuss.     - VAGINAL PATHOGENS DNA PANEL; Future  - Chlamydia/GC, PCR (Genital/Anal swab); Future    2. Screening examination for sexually transmitted disease    - Chlamydia/GC, PCR (Genital/Anal swab); Future  - HIV AG/AB Combo Assay Screening; Future  - T.Pallidum AB DEYA (Screening); Future  - Trichomonas Vaginalis by TMA  - Hepatitis C Virus Antibody; Future  - HEP B Surface Antibody; Future  - Hep B Core AB Total; Future  - Hep B Surface Antigen; Future      Return if symptoms worsen or fail to improve.    SAMPSON Izaguirre   [facility]    Medical Decision Making/Course:  In the course of preparing for this visit with review of the pertinent past medical history, recent and past clinic visits, current medications, and performing chart, immunization, medical history and medication reconciliation, and in the further course of obtaining the current history pertinent to the clinic visit today, performing an exam and evaluation, ordering and independently evaluating labs, tests, and/or procedures, prescribing any recommended new medications as noted above, providing any pertinent counseling and education and recommending further coordination of care. This was discussed with patient in a shared-decision making conversation, and they understand and agreed with plan of care.     Please note that this dictation was created using voice recognition software. I have made every reasonable attempt to correct obvious errors, but I expect that there are errors of grammar and possibly content that I did not discover before finalizing the note.         [1]   Past Medical History:  Diagnosis Date    Depression 6/2/2020   [2]   Past Surgical History:  Procedure Laterality Date    SEPTOPLASTY Bilateral 04/09/2025    LIPOSUCTION      MAMMOPLASTY AUGMENTATION      OTHER      Nose surgery    [3]   Social History  Tobacco Use    Smoking status: Never    Smokeless tobacco: Never   Vaping Use    Vaping status: Never Used   Substance Use Topics    Alcohol use: Not Currently     Comment: social    Drug use: Not Currently     Types: Inhaled, Marijuana     Comment: Marijuana in past 2 years   [4]   No current [facility]-ordered outpatient medications on file.     No current [facility]-ordered facility-administered medications on file.

## 2025-07-17 ENCOUNTER — RESULTS FOLLOW-UP (OUTPATIENT)
Dept: MEDICAL GROUP | Facility: IMAGING CENTER | Age: 25
End: 2025-07-17
Payer: COMMERCIAL
